# Patient Record
Sex: FEMALE | Race: WHITE | HISPANIC OR LATINO | Employment: FULL TIME | URBAN - METROPOLITAN AREA
[De-identification: names, ages, dates, MRNs, and addresses within clinical notes are randomized per-mention and may not be internally consistent; named-entity substitution may affect disease eponyms.]

---

## 2017-01-09 ENCOUNTER — OFFICE VISIT (OUTPATIENT)
Dept: OBSTETRICS AND GYNECOLOGY | Facility: CLINIC | Age: 27
End: 2017-01-09
Payer: MEDICAID

## 2017-01-09 VITALS
SYSTOLIC BLOOD PRESSURE: 120 MMHG | HEIGHT: 65 IN | WEIGHT: 198 LBS | BODY MASS INDEX: 32.99 KG/M2 | DIASTOLIC BLOOD PRESSURE: 74 MMHG

## 2017-01-09 DIAGNOSIS — R30.0 SPASTIC DYSURIA: Primary | ICD-10-CM

## 2017-01-09 PROCEDURE — 87086 URINE CULTURE/COLONY COUNT: CPT

## 2017-01-09 PROCEDURE — 87077 CULTURE AEROBIC IDENTIFY: CPT

## 2017-01-09 PROCEDURE — 87591 N.GONORRHOEAE DNA AMP PROB: CPT

## 2017-01-09 PROCEDURE — 99213 OFFICE O/P EST LOW 20 MIN: CPT | Mod: S$PBB,,, | Performed by: OBSTETRICS & GYNECOLOGY

## 2017-01-09 PROCEDURE — 99999 PR PBB SHADOW E&M-EST. PATIENT-LVL III: CPT | Mod: PBBFAC,,, | Performed by: OBSTETRICS & GYNECOLOGY

## 2017-01-09 PROCEDURE — 87186 SC STD MICRODIL/AGAR DIL: CPT

## 2017-01-09 PROCEDURE — 99213 OFFICE O/P EST LOW 20 MIN: CPT | Mod: PBBFAC | Performed by: OBSTETRICS & GYNECOLOGY

## 2017-01-09 PROCEDURE — 87088 URINE BACTERIA CULTURE: CPT

## 2017-01-09 RX ORDER — SULFAMETHOXAZOLE AND TRIMETHOPRIM 800; 160 MG/1; MG/1
1 TABLET ORAL 2 TIMES DAILY
Qty: 6 TABLET | Refills: 0 | Status: SHIPPED | OUTPATIENT
Start: 2017-01-09 | End: 2017-01-12

## 2017-01-10 NOTE — PROGRESS NOTES
CC:  Painful urination    HPI:  26 yro  who uses Nexplanon for contraception presents due to painful urination and inability to fully void.  Pt states she has just recently become sexually active after a long time.    Urine dipstick shows positive for WBC's and positive for RBC's.  Micro exam: not done.    Physical Exam   Constitutional: She is oriented to person, place, and time. She appears well-developed and well-nourished. No distress.   HENT:   Head: Normocephalic and atraumatic.   Neck: Normal range of motion. Neck supple. No tracheal deviation present. No thyromegaly present.   Cardiovascular: Normal rate.    Pulmonary/Chest: Effort normal.   Abdominal: Soft.   Neurological: She is alert and oriented to person, place, and time. No cranial nerve deficit. Coordination normal.   Skin: She is not diaphoretic.   Psychiatric: She has a normal mood and affect. Her behavior is normal. Judgment and thought content normal.   Vitals reviewed.            Assessment/Plan  1. Spastic dysuria  POCT URINE DIPSTICK WITHOUT MICROSCOPE    Urine culture    C. trachomatis/N. gonorrhoeae by AMP DNA Urine    sulfamethoxazole-trimethoprim 800-160mg (BACTRIM DS) 800-160 mg Tab

## 2017-01-11 LAB
BACTERIA UR CULT: NORMAL
C TRACH DNA SPEC QL NAA+PROBE: NEGATIVE
N GONORRHOEA DNA SPEC QL NAA+PROBE: NEGATIVE

## 2017-02-05 ENCOUNTER — HOSPITAL ENCOUNTER (EMERGENCY)
Facility: OTHER | Age: 27
Discharge: HOME | End: 2017-02-05
Attending: EMERGENCY MEDICINE
Payer: MEDICAID

## 2017-02-05 VITALS
RESPIRATION RATE: 18 BRPM | SYSTOLIC BLOOD PRESSURE: 145 MMHG | OXYGEN SATURATION: 98 % | DIASTOLIC BLOOD PRESSURE: 83 MMHG | TEMPERATURE: 98 F | HEART RATE: 98 BPM

## 2017-02-05 DIAGNOSIS — N30.01 ACUTE CYSTITIS WITH HEMATURIA: Primary | ICD-10-CM

## 2017-02-05 DIAGNOSIS — N32.89 BLADDER SPASMS: ICD-10-CM

## 2017-02-05 LAB
B-HCG UR QL: NEGATIVE
CTP QC/QA: YES

## 2017-02-05 PROCEDURE — 87077 CULTURE AEROBIC IDENTIFY: CPT

## 2017-02-05 PROCEDURE — 87086 URINE CULTURE/COLONY COUNT: CPT

## 2017-02-05 PROCEDURE — 81003 URINALYSIS AUTO W/O SCOPE: CPT

## 2017-02-05 PROCEDURE — 81025 URINE PREGNANCY TEST: CPT | Performed by: INTERNAL MEDICINE

## 2017-02-05 PROCEDURE — 87088 URINE BACTERIA CULTURE: CPT

## 2017-02-05 PROCEDURE — 99283 EMERGENCY DEPT VISIT LOW MDM: CPT

## 2017-02-05 PROCEDURE — 99284 EMERGENCY DEPT VISIT MOD MDM: CPT

## 2017-02-05 PROCEDURE — 87186 SC STD MICRODIL/AGAR DIL: CPT

## 2017-02-05 PROCEDURE — 25000003 PHARM REV CODE 250: Performed by: EMERGENCY MEDICINE

## 2017-02-05 PROCEDURE — 87210 SMEAR WET MOUNT SALINE/INK: CPT

## 2017-02-05 PROCEDURE — 81025 URINE PREGNANCY TEST: CPT

## 2017-02-05 RX ORDER — ONDANSETRON 4 MG/1
4 TABLET, ORALLY DISINTEGRATING ORAL
Status: DISCONTINUED | OUTPATIENT
Start: 2017-02-05 | End: 2017-02-05

## 2017-02-05 RX ORDER — HYDROCODONE BITARTRATE AND ACETAMINOPHEN 5; 325 MG/1; MG/1
1 TABLET ORAL
Status: COMPLETED | OUTPATIENT
Start: 2017-02-05 | End: 2017-02-05

## 2017-02-05 RX ORDER — CLINDAMYCIN PHOSPHATE 150 MG/ML
600 INJECTION, SOLUTION INTRAVENOUS
Status: DISCONTINUED | OUTPATIENT
Start: 2017-02-05 | End: 2017-02-05

## 2017-02-05 RX ORDER — PHENAZOPYRIDINE HYDROCHLORIDE 200 MG/1
200 TABLET, FILM COATED ORAL 3 TIMES DAILY PRN
Qty: 6 TABLET | Refills: 0 | Status: SHIPPED | OUTPATIENT
Start: 2017-02-05 | End: 2017-02-07

## 2017-02-05 RX ORDER — CIPROFLOXACIN 500 MG/1
500 TABLET ORAL 2 TIMES DAILY
Qty: 14 TABLET | Refills: 0 | Status: SHIPPED | OUTPATIENT
Start: 2017-02-05 | End: 2017-02-12

## 2017-02-05 RX ORDER — OXYBUTYNIN CHLORIDE 5 MG/1
5 TABLET ORAL 3 TIMES DAILY
Qty: 30 TABLET | Refills: 11 | Status: SHIPPED | OUTPATIENT
Start: 2017-02-05 | End: 2017-07-17

## 2017-02-05 RX ORDER — HYDROCODONE BITARTRATE AND ACETAMINOPHEN 5; 325 MG/1; MG/1
1 TABLET ORAL EVERY 6 HOURS PRN
Qty: 12 TABLET | Refills: 0 | Status: SHIPPED | OUTPATIENT
Start: 2017-02-05 | End: 2017-07-17

## 2017-02-05 RX ADMIN — HYDROCODONE BITARTRATE AND ACETAMINOPHEN 1 TABLET: 5; 325 TABLET ORAL at 08:02

## 2017-02-05 NOTE — ED AVS SNAPSHOT
[facility] EMERGENCY DEPARTMENT  [address] [address]               Crystal Tsai   2017  6:54 AM   ED    Description:  Female : 1990   Department:  [facility] Emergency Department           Your Care was Coordinated By:     Provider Role From To    Alda Alfonso MD Attending Provider 17 0713 --      Reason for Visit     Cystitis           Diagnoses this Visit        Comments    Acute cystitis with hematuria    -  Primary     Bladder spasms           ED Disposition     ED Disposition Condition Comment    Discharge             To Do List           Follow-up Information     Follow up with Jayme Romano NP.    Specialty:  Internal Medicine    Contact information:    [address] [place] [address]  [phone]         These Medications        Disp Refills Start End    ciprofloxacin HCl (CIPRO) 500 MG tablet 14 tablet 0 2017    Take 1 tablet (500 mg total) by mouth 2 (two) times daily. - Oral    Pharmacy: [facility] [address] [place] [address] EXPY AT [facility] Ph #: [phone]       oxybutynin (DITROPAN) 5 MG Tab 30 tablet 11 2017    Take 1 tablet (5 mg total) by mouth 3 (three) times daily. - Oral    Pharmacy: [facility] [address] [place] AT [facility] Ph #: [phone]       phenazopyridine (PYRIDIUM) 200 MG tablet 6 tablet 0 2017    Take 1 tablet (200 mg total) by mouth 3 (three) times daily as needed for Pain. - Oral    Pharmacy: [facility] [address] [place] [address] [place] AT [facility] Ph #: [phone]       hydrocodone-acetaminophen 5-325mg (NORCO) 5-325 mg per tablet 12 tablet 0 2017     Take 1 tablet by mouth every 6 (six) hours as needed for Pain. - Oral    Pharmacy: [facility] [address] [place] AT [facility] Ph #:  [phone]         [facility]     [facility] On Call Nurse Care Line - 24/7 Assistance  Registered nurses in the [facility] Center provide clinical advisement, health education, appointment booking, and other advisory services.  Call for this free service at [phone].             Medications           Message regarding Medications     Verify the changes and/or additions to your medication regime listed below are the same as discussed with your clinician today.  If any of these changes or additions are incorrect, please notify your healthcare provider.        START taking these NEW medications        Refills    ciprofloxacin HCl (CIPRO) 500 MG tablet 0    Sig: Take 1 tablet (500 mg total) by mouth 2 (two) times daily.    Class: Print    Route: Oral    oxybutynin (DITROPAN) 5 MG Tab 11    Sig: Take 1 tablet (5 mg total) by mouth 3 (three) times daily.    Class: Print    Route: Oral    phenazopyridine (PYRIDIUM) 200 MG tablet 0    Sig: Take 1 tablet (200 mg total) by mouth 3 (three) times daily as needed for Pain.    Class: Print    Route: Oral    hydrocodone-acetaminophen 5-325mg (NORCO) 5-325 mg per tablet 0    Sig: Take 1 tablet by mouth every 6 (six) hours as needed for Pain.    Class: Print    Route: Oral      These medications were administered today        Dose Freq    hydrocodone-acetaminophen 5-325mg per tablet 1 tablet 1 tablet ED 1 Time    Sig: Take 1 tablet by mouth ED 1 Time.    Class: Normal    Route: Oral      STOP taking these medications     VIRTUSSIN AC  mg/5 mL syrup            Verify that the below list of medications is an accurate representation of the medications you are currently taking.  If none reported, the list may be blank. If incorrect, please contact your healthcare provider. Carry this list with you in case of emergency.           Current Medications     amoxicillin-clavulanate 875-125mg (AUGMENTIN) 875-125 mg per tablet     butalbital-acetaminophen-caffeine  -40 mg (FIORICET, ESGIC) -40 mg per tablet Take 1 tablet by mouth every 4 (four) hours as needed for Pain.    ciprofloxacin HCl (CIPRO) 500 MG tablet Take 1 tablet (500 mg total) by mouth 2 (two) times daily.    clobetasol (TEMOVATE) 0.05 % cream Apply topically 2 (two) times daily.    etonogestrel (NEXPLANON) 68 mg Impl by Subdermal route.    hydrocodone-acetaminophen 5-325mg (NORCO) 5-325 mg per tablet Take 1 tablet by mouth every 6 (six) hours as needed for Pain.    hydrocodone-acetaminophen 5-325mg per tablet 1 tablet Take 1 tablet by mouth ED 1 Time.    labetalol (NORMODYNE) 100 MG tablet Take 1 tablet (100 mg total) by mouth 2 (two) times daily.    methylPREDNISolone (MEDROL DOSEPACK) 4 mg tablet follow package directions    oxybutynin (DITROPAN) 5 MG Tab Take 1 tablet (5 mg total) by mouth 3 (three) times daily.    phenazopyridine (PYRIDIUM) 200 MG tablet Take 1 tablet (200 mg total) by mouth 3 (three) times daily as needed for Pain.           Clinical Reference Information           Your Vitals Were     BP Pulse Temp Resp Last Period SpO2    145/83 98 97.9 °F (36.6 °C) (Tympanic) 18 01/21/2017 (Approximate) 98%      Allergies as of 2/5/2017        Reactions    Naprosyn [Naproxen] Hives    Ultram [Tramadol] Nausea And Vomiting      Immunizations Administered on Date of Encounter - 2/5/2017     None      ED Micro, Lab, POCT     Start Ordered       Status Ordering Provider    02/05/17 0808 02/05/17 0807  Vaginal Screen Vagina  STAT      Ordered     02/05/17 0718 02/05/17 0717  Urine culture **CANNOT BE ORDERED STAT**  Once      In process     02/05/17 0711 02/05/17 0711  POCT urine pregnancy  Once      Final result     02/05/17 0711 02/05/17 0711  POCT URINALYSIS W/O SCOPE  Once      Completed       ED Imaging Orders     None        Discharge Instructions       Cipro 500 mg 1 by mouth twice daily until gone for a bladder infection.    Pyridium 200 mg 1 by mouth 3 times daily for 2 days for burning  with urination.  This medication will turn her urine bright orange.  Oxybutynin 5 mg 1 by mouth 3 times daily as needed for bladder spasm.  Norco 5 mg/325 mg 1 by mouth every 6 hours as needed for pain.  Follow up with primary care or your urologist in the next week for recheck.      Discharge References/Attachments     BLADDER INFECTION, FEMALE (ADULT) (ENGLISH)      Smoking Cessation     If you would like to quit smoking:   You may be eligible for free services if you are a [place] resident and started smoking cigarettes before September 1, 1988.  Call the Smoking Cessation Trust ([facility]) toll free at ([phone]) [phone] or ([phone] [phone].   Call 5-800-QUIT-NOW if you do not meet the above criteria.             [facility] Emergency Department complies with applicable Federal civil rights laws and does not discriminate on the basis of race, color, national origin, age, disability, or sex.        Language Assistance Services     ATTENTION: Language assistance services are available, free of charge. Please call [phone].      ATENCIÓN: Si habla lorena, tiene a porter disposición servicios gratuitos de asistencia lingüística. Llame al [phone].     SABRINA Ý: N?u b?n nói Ti?ng Vi?t, có các d?ch v? h? tr? ngôn ng? mi?n phí dành cho b?n. G?i s? [phone].

## 2017-02-05 NOTE — ED PROVIDER NOTES
Encounter Date: 2/5/2017       History     Chief Complaint   Patient presents with    Cystitis     Pt stated she just finished ATB 2 weeks ago for UTI, now with c/o bladder pressure/dysuria/urinary urgency that began 3 hours PTA     Review of patient's allergies indicates:   Allergen Reactions    Naprosyn [naproxen] Hives    Ultram [tramadol] Nausea And Vomiting     HPI Comments: 26-year-old female reports lower abdominal/bladder pressure with burning, dysuria and urinary urgency that began about 4:00 this morning.  She reports she was treated for urinary tract infection 2 weeks ago and completed those antibiotics.  Patient has a history of spastic dysuria per review of old chart. No new sex partner since her last GYN visit in January.  She does report a mucoid discharge.    Patient is a 26 y.o. female presenting with the following complaint: dysuria. The history is provided by the patient.   Dysuria    This is a recurrent problem. The current episode started 3 to 5 hours ago. The problem occurs every urination. The problem has been rapidly worsening. The quality of the pain is described as burning (Lower abdominal pelvic cramping). The pain is at a severity of 9/10. She is sexually active. Associated symptoms include frequency. Pertinent negatives include no chills, no nausea and no vomiting.     Past Medical History   Diagnosis Date    Interstitial cystitis     Migraine      Past Medical History Pertinent Negatives   Diagnosis Date Noted    Asthma 5/4/2014    Depression 5/4/2014    Diabetes mellitus 4/21/2013    Diabetes mellitus 5/4/2014    GERD (gastroesophageal reflux disease) 5/4/2014    Hypertension 4/21/2013     Past Surgical History   Procedure Laterality Date    Pr exploratory of abdomen      Abdominal surgery       Family History   Problem Relation Age of Onset    Cancer Mother     Diabetes Father      Social History   Substance Use Topics    Smoking status: Current Every Day Smoker      Packs/day: 0.50    Smokeless tobacco: Never Used    Alcohol use 0.0 oz/week     0 Standard drinks or equivalent per week      Comment: occassionally     Review of Systems   Constitutional: Negative for chills and fever.   Gastrointestinal: Negative for abdominal pain, nausea and vomiting.   Genitourinary: Positive for dysuria, frequency, pelvic pain and vaginal discharge. Negative for genital sores.   Skin: Negative for rash and wound.       Physical Exam   Initial Vitals   BP Pulse Resp Temp SpO2   02/05/17 0700 02/05/17 0700 02/05/17 0700 02/05/17 0700 02/05/17 0700   145/83 98 18 97.9 °F (36.6 °C) 98 %     Physical Exam    Nursing note and vitals reviewed.  Constitutional: Vital signs are normal. She appears well-developed and well-nourished. She is cooperative.   HENT:   Head: Normocephalic and atraumatic.   Neck: Full passive range of motion without pain and phonation normal.   Cardiovascular: Normal rate, regular rhythm and normal heart sounds.   Pulses:       Radial pulses are 2+ on the right side, and 2+ on the left side.   Pulmonary/Chest: Effort normal and breath sounds normal.   Abdominal: Soft. There is no tenderness.   Genitourinary: Pelvic exam was performed with patient prone. There is no rash or lesion on the right labia. There is no rash or lesion on the left labia. Cervix exhibits no motion tenderness. Right adnexum displays no tenderness. Left adnexum displays no tenderness. Vaginal discharge found.   Genitourinary Comments: Severino vaginal discharge; no focal tenderness on pelvic exam   Neurological: She is alert and oriented to person, place, and time.   Skin: Skin is warm and dry.         ED Course   Procedures  Labs Reviewed   CULTURE, URINE   POCT URINE PREGNANCY   POCT URINALYSIS W/O SCOPE             Medical Decision Making:   History:   Old Records Summarized: other records.       <> Summary of Records: Initial urinalysis and urine culture results.  Initial Assessment:   Dysuria and lower  abdominal pelvic burning and cramping for several hours  Differential Diagnosis:   Tract infection, bladder spasms, pelvic pain  Clinical Tests:   Lab Tests: Ordered and Reviewed  The following lab test(s) were unremarkable: Urinalysis and UPT       <> Summary of Lab: Urinalysis showed large blood, greater than 300 mg/dL of protein, and large leukocytes but negative nitrites.  UPT negative  ED Management:  Patient had a urine culture last month which grew out Proteus mirabilis which was sensitive to Bactrim which the patient was placed on.  We'll start Cipro 500 mg twice a day for 1 week, Pyridium 200 mg 3 times daily for dysuria, oxybutynin 5 mg up to 3 times a day for bladder spasms, and Norco 7.5 mg when necessary pain.  Urine culture was sent today, and the patient was advised to follow up with her primary care or her urologist as soon as possible.                   ED Course     Clinical Impression:   The encounter diagnosis was Acute cystitis with hematuria.    Disposition:   Disposition: Discharged  Condition: Stable       Alda Alfonso MD  02/05/17 2032

## 2017-02-05 NOTE — DISCHARGE INSTRUCTIONS
Cipro 500 mg 1 by mouth twice daily until gone for a bladder infection.    Pyridium 200 mg 1 by mouth 3 times daily for 2 days for burning with urination.  This medication will turn her urine bright orange.  Oxybutynin 5 mg 1 by mouth 3 times daily as needed for bladder spasm.  Norco 5 mg/325 mg 1 by mouth every 6 hours as needed for pain.  Follow up with primary care or your urologist in the next week for recheck.

## 2017-02-05 NOTE — ED NOTES
Awaiting pt's ride prior to administering narcotic medication. Pt updated on procedure of ride required.

## 2017-02-06 LAB
BILIRUB SERPL-MCNC: NEGATIVE MG/DL
BLOOD, POC UA: NORMAL
CLARITY, POC UA: NORMAL
COLOR, POC UA: NORMAL
GLUCOSE SERPL-MCNC: NEGATIVE MG/DL (ref 70–110)
LEUKOCYTE EST, POC UA: NORMAL
NITRITE, POC UA: NEGATIVE
PH SMN: 6.5 [PH]
POC KETONES, BLOOD: NEGATIVE
PROTEIN, POC: >=300 MG/DL
SPECIFIC GRAVITY, POC UA: 1.02
UROBILINOGEN, POC UA: 1 E.U./DL

## 2017-02-07 LAB — BACTERIA UR CULT: NORMAL

## 2017-07-05 ENCOUNTER — OFFICE VISIT (OUTPATIENT)
Dept: OBSTETRICS AND GYNECOLOGY | Facility: CLINIC | Age: 27
End: 2017-07-05
Payer: MEDICAID

## 2017-07-05 VITALS
SYSTOLIC BLOOD PRESSURE: 126 MMHG | DIASTOLIC BLOOD PRESSURE: 70 MMHG | BODY MASS INDEX: 28.17 KG/M2 | WEIGHT: 165 LBS | HEIGHT: 64 IN

## 2017-07-05 DIAGNOSIS — N76.3 SUBACUTE VULVITIS: ICD-10-CM

## 2017-07-05 DIAGNOSIS — N30.10 CHRONIC INTERSTITIAL CYSTITIS WITHOUT HEMATURIA: ICD-10-CM

## 2017-07-05 DIAGNOSIS — B37.31 CANDIDA VAGINITIS: Primary | ICD-10-CM

## 2017-07-05 PROCEDURE — 87480 CANDIDA DNA DIR PROBE: CPT

## 2017-07-05 PROCEDURE — 99213 OFFICE O/P EST LOW 20 MIN: CPT | Mod: PBBFAC | Performed by: OBSTETRICS & GYNECOLOGY

## 2017-07-05 PROCEDURE — 99213 OFFICE O/P EST LOW 20 MIN: CPT | Mod: S$PBB,,, | Performed by: OBSTETRICS & GYNECOLOGY

## 2017-07-05 PROCEDURE — 87660 TRICHOMONAS VAGIN DIR PROBE: CPT

## 2017-07-05 PROCEDURE — 99999 PR PBB SHADOW E&M-EST. PATIENT-LVL III: CPT | Mod: PBBFAC,,, | Performed by: OBSTETRICS & GYNECOLOGY

## 2017-07-05 RX ORDER — CLOTRIMAZOLE AND BETAMETHASONE DIPROPIONATE 10; .64 MG/G; MG/G
CREAM TOPICAL
Qty: 15 G | Refills: 1 | Status: SHIPPED | OUTPATIENT
Start: 2017-07-05 | End: 2017-07-17

## 2017-07-05 RX ORDER — FLUCONAZOLE 150 MG/1
150 TABLET ORAL DAILY
Qty: 1 TABLET | Refills: 0 | Status: SHIPPED | OUTPATIENT
Start: 2017-07-05 | End: 2017-07-06

## 2017-07-05 RX ORDER — SULFAMETHOXAZOLE AND TRIMETHOPRIM 800; 160 MG/1; MG/1
TABLET ORAL
COMMUNITY
Start: 2017-07-03 | End: 2017-07-17 | Stop reason: ALTCHOICE

## 2017-07-05 NOTE — PROGRESS NOTES
"  Subjective:       Patient ID: Crystal Tsai is a 26 y.o. female.    Chief Complaint:  Pelvic Pain      History of Present Illness  HPI  Patient comes in today complaining of having vulva and vaginal pain for the past weeks.  Lower abdominal/pelvic pain.  More toward midline.   Sharp "like a paper cut" with clitoris feels like "being pinched"  Now with odor and itching.  She called her PCP.  Bactrim DS given.   Just recently finished the course of antibiotic.    History of interstitial cystitis.  Has not been to Urology.    Denies fever or chills.  No nausea or vomiting.        GYN & OB History  Patient's last menstrual period was 2017.   Date of Last Pap: 2013    OB History    Para Term  AB Living   2 2 2     2   SAB TAB Ectopic Multiple Live Births           2      # Outcome Date GA Lbr Tyler/2nd Weight Sex Delivery Anes PTL Lv   2 Term 14 40w0d  3.317 kg (7 lb 5 oz) F   N RAJ   1 Term 07 40w0d  3.26 kg (7 lb 3 oz) F Vag-Spont EPI N RAJ        Past Medical History:   Diagnosis Date    Interstitial cystitis     Migraine        Past Surgical History:   Procedure Laterality Date    ABDOMINAL SURGERY      UT EXPLORATORY OF ABDOMEN         Family History   Problem Relation Age of Onset    Cancer Mother     Diabetes Father        Social History     Social History    Marital status: Single     Spouse name: N/A    Number of children: N/A    Years of education: N/A     Social History Main Topics    Smoking status: Current Every Day Smoker     Packs/day: 0.50    Smokeless tobacco: Never Used    Alcohol use 0.0 oz/week      Comment: occassionally    Drug use: No    Sexual activity: Yes     Partners: Male     Birth control/ protection: None     Other Topics Concern    None     Social History Narrative    She has a high school diploma. Patient is currently not working. She has been together with the same person for over 3 years. He works as a .              "       Current Outpatient Prescriptions   Medication Sig Dispense Refill    amoxicillin-clavulanate 875-125mg (AUGMENTIN) 875-125 mg per tablet   0    butalbital-acetaminophen-caffeine -40 mg (FIORICET, ESGIC) -40 mg per tablet Take 1 tablet by mouth every 4 (four) hours as needed for Pain. 30 tablet 1    clobetasol (TEMOVATE) 0.05 % cream Apply topically 2 (two) times daily. 45 g 0    clotrimazole-betamethasone 1-0.05% (LOTRISONE) cream Apply to affected area 2 times daily 15 g 1    etonogestrel (NEXPLANON) 68 mg Impl by Subdermal route.      fluconazole (DIFLUCAN) 150 MG Tab Take 1 tablet (150 mg total) by mouth once daily. 1 tablet 0    hydrocodone-acetaminophen 5-325mg (NORCO) 5-325 mg per tablet Take 1 tablet by mouth every 6 (six) hours as needed for Pain. 12 tablet 0    labetalol (NORMODYNE) 100 MG tablet Take 1 tablet (100 mg total) by mouth 2 (two) times daily. 60 tablet 2    methylPREDNISolone (MEDROL DOSEPACK) 4 mg tablet follow package directions 21 tablet 0    oxybutynin (DITROPAN) 5 MG Tab Take 1 tablet (5 mg total) by mouth 3 (three) times daily. 30 tablet 11    pentosan polysulfate (ELMIRON) 100 mg Cap Take 1 capsule (100 mg total) by mouth 3 (three) times daily. 30 capsule 0    sulfamethoxazole-trimethoprim 800-160mg (BACTRIM DS) 800-160 mg Tab        No current facility-administered medications for this visit.        Review of patient's allergies indicates:   Allergen Reactions    Naprosyn [naproxen] Hives    Ultram [tramadol] Nausea And Vomiting       Review of Systems  Review of Systems   Constitutional: Negative for activity change, appetite change, chills, fatigue, fever and unexpected weight change.   HENT: Negative for mouth sores.    Respiratory: Negative for cough, shortness of breath and wheezing.    Cardiovascular: Negative for chest pain and palpitations.   Gastrointestinal: Positive for abdominal pain. Negative for bloating, blood in stool, constipation, nausea  and vomiting.   Endocrine: Negative for diabetes and hot flashes.   Genitourinary: Positive for dyspareunia, dysuria and pelvic pain. Negative for frequency, hematuria, menorrhagia, menstrual problem, urgency, vaginal bleeding, vaginal discharge, vaginal pain, dysmenorrhea, urinary incontinence, postcoital bleeding and vaginal odor.        Vulva and vaginal pain with irritation   Musculoskeletal: Negative for back pain and myalgias.   Skin:  Negative for rash.   Neurological: Negative for seizures and headaches.   Psychiatric/Behavioral: Negative for depression and sleep disturbance. The patient is not nervous/anxious.    Breast: Negative for breast mass, breast pain and nipple discharge          Objective:    Physical Exam:   Constitutional: She appears well-developed and well-nourished. No distress.    HENT:   Head: Normocephalic and atraumatic.    Eyes: EOM are normal.    Neck: Normal range of motion.     Pulmonary/Chest: Effort normal. No respiratory distress.        Abdominal: Soft. She exhibits no distension. There is no tenderness. There is no rebound and no guarding.     Genitourinary: Vagina normal and uterus normal. No vaginal discharge found.   Genitourinary Comments: Vulva with obvious irritation over labia minora toward posterior fourchette.  Vaginal vault with good support.  Minimal clumpy white discharge noted.  No obvious lesion.  Cervix is without any cervical motion tenderness.  No obvious lesion.  Uterus is small, non-tender, normal contour.  Adnexa is without any masses or tenderness.    Tender bladder.           Musculoskeletal: Normal range of motion.       Neurological: She is alert.    Skin: Skin is warm and dry.    Psychiatric: She has a normal mood and affect.          Assessment:        1. Candida vaginitis    2. Chronic interstitial cystitis without hematuria    3. Subacute vulvitis             Plan:      I have discussed with the patient regarding her condition  Affirm  Fluconazole 150  mg po    Elmiron.  Patient would need to see Urology soon    She was told to stop shaving.  Lotrisone given to use on vulva.

## 2017-07-06 ENCOUNTER — TELEPHONE (OUTPATIENT)
Dept: OBSTETRICS AND GYNECOLOGY | Facility: CLINIC | Age: 27
End: 2017-07-06

## 2017-07-06 DIAGNOSIS — B96.89 BACTERIAL VAGINOSIS: Primary | ICD-10-CM

## 2017-07-06 DIAGNOSIS — N76.0 BACTERIAL VAGINOSIS: Primary | ICD-10-CM

## 2017-07-06 LAB
CANDIDA RRNA VAG QL PROBE: POSITIVE
G VAGINALIS RRNA GENITAL QL PROBE: POSITIVE
T VAGINALIS RRNA GENITAL QL PROBE: NEGATIVE

## 2017-07-06 RX ORDER — METRONIDAZOLE 500 MG/1
500 TABLET ORAL EVERY 12 HOURS
Qty: 14 TABLET | Refills: 0 | Status: SHIPPED | OUTPATIENT
Start: 2017-07-06 | End: 2017-07-20

## 2017-07-17 ENCOUNTER — HOSPITAL ENCOUNTER (EMERGENCY)
Facility: OTHER | Age: 27
Discharge: HOME | End: 2017-07-17
Attending: EMERGENCY MEDICINE
Payer: MEDICAID

## 2017-07-17 VITALS
BODY MASS INDEX: 28.15 KG/M2 | DIASTOLIC BLOOD PRESSURE: 82 MMHG | SYSTOLIC BLOOD PRESSURE: 119 MMHG | RESPIRATION RATE: 18 BRPM | TEMPERATURE: 98 F | WEIGHT: 164 LBS | HEART RATE: 103 BPM

## 2017-07-17 DIAGNOSIS — N12 PYELONEPHRITIS: ICD-10-CM

## 2017-07-17 DIAGNOSIS — R33.9 INCOMPLETE BLADDER EMPTYING: Primary | ICD-10-CM

## 2017-07-17 LAB
B-HCG UR QL: NEGATIVE
BILIRUBIN, POC UA: NEGATIVE
BLOOD, POC UA: ABNORMAL
CLARITY, POC UA: CLEAR
COLOR, POC UA: YELLOW
CTP QC/QA: YES
GLUCOSE, POC UA: NEGATIVE
KETONES, POC UA: ABNORMAL
LEUKOCYTE EST, POC UA: ABNORMAL
NITRITE, POC UA: NEGATIVE
PH UR STRIP: 6 [PH]
PROTEIN, POC UA: NEGATIVE
SPECIFIC GRAVITY, POC UA: 1.02
UROBILINOGEN, POC UA: 0.2 E.U./DL

## 2017-07-17 PROCEDURE — 63600175 PHARM REV CODE 636 W HCPCS: Performed by: EMERGENCY MEDICINE

## 2017-07-17 PROCEDURE — 87086 URINE CULTURE/COLONY COUNT: CPT

## 2017-07-17 PROCEDURE — 99284 EMERGENCY DEPT VISIT MOD MDM: CPT | Mod: 25

## 2017-07-17 PROCEDURE — 96372 THER/PROPH/DIAG INJ SC/IM: CPT

## 2017-07-17 PROCEDURE — 81025 URINE PREGNANCY TEST: CPT | Performed by: EMERGENCY MEDICINE

## 2017-07-17 PROCEDURE — 81003 URINALYSIS AUTO W/O SCOPE: CPT

## 2017-07-17 RX ORDER — PHENAZOPYRIDINE HYDROCHLORIDE 200 MG/1
200 TABLET, FILM COATED ORAL 3 TIMES DAILY
Qty: 6 TABLET | Refills: 0 | Status: SHIPPED | OUTPATIENT
Start: 2017-07-17 | End: 2017-07-28

## 2017-07-17 RX ORDER — CIPROFLOXACIN 500 MG/1
500 TABLET ORAL 2 TIMES DAILY
Qty: 20 TABLET | Refills: 0 | Status: SHIPPED | OUTPATIENT
Start: 2017-07-17 | End: 2017-07-28

## 2017-07-17 RX ORDER — IBUPROFEN 800 MG/1
800 TABLET ORAL EVERY 6 HOURS PRN
Qty: 20 TABLET | Refills: 0 | Status: SHIPPED | OUTPATIENT
Start: 2017-07-17 | End: 2017-08-31

## 2017-07-17 RX ORDER — CEFTRIAXONE 1 G/1
1 INJECTION, POWDER, FOR SOLUTION INTRAMUSCULAR; INTRAVENOUS
Status: DISCONTINUED | OUTPATIENT
Start: 2017-07-17 | End: 2017-07-18 | Stop reason: HOSPADM

## 2017-07-17 RX ORDER — KETOROLAC TROMETHAMINE 30 MG/ML
60 INJECTION, SOLUTION INTRAMUSCULAR; INTRAVENOUS
Status: COMPLETED | OUTPATIENT
Start: 2017-07-17 | End: 2017-07-17

## 2017-07-17 RX ADMIN — KETOROLAC TROMETHAMINE 60 MG: 30 INJECTION, SOLUTION INTRAMUSCULAR at 09:07

## 2017-07-18 NOTE — ED TRIAGE NOTES
Patient states she has a long history of urinary problems.  She has been on several antibiotics with no relief.  She is having burning upon urination, which she does not normally have.  She states she is in a lot of pain.

## 2017-07-18 NOTE — ED PROVIDER NOTES
Encounter Date: 7/17/2017       History     Chief Complaint   Patient presents with    Flank Pain     painful urination with left flank pain, hx of kidney stones    Dysuria     26 y.o. Female past medical history of interstitial cystitis presents to emergency department complaining of acute suprapubic pain, pressure, dysuria, hesitancy, tenesmus that has been intermittently recurrent over the last 2 months.          She states she was seen by her PCP on 7/3/17  was prescribed Bactrim ×3 days - completed.   Patient was seen by her OB/GYN on July 5, 2017 and diagnosed with candida vaginitis as well as BV and was prescribed Flagyl as well as fluconazole. .  Review of patient's allergies indicates:   Allergen Reactions    Naprosyn [naproxen] Hives    Ultram [tramadol] Nausea And Vomiting     Past Medical History:   Diagnosis Date    Interstitial cystitis     Migraine      Past Surgical History:   Procedure Laterality Date    ABDOMINAL SURGERY      [place] EXPLORATORY OF ABDOMEN       Family History   Problem Relation Age of Onset    Cancer Mother     Diabetes Father      Social History   Substance Use Topics    Smoking status: Current Every Day Smoker     Packs/day: 0.50    Smokeless tobacco: Never Used    Alcohol use 0.0 oz/week      Comment: occassionally     Review of Systems   Constitutional: Negative for chills and fever.   Gastrointestinal: Negative for abdominal pain, constipation, diarrhea, nausea and vomiting.   Genitourinary: Positive for difficulty urinating, dysuria, flank pain (left), frequency, hematuria (gross) and pelvic pain. Negative for decreased urine volume and vaginal discharge.   All other systems reviewed and are negative.      Physical Exam     Initial Vitals [07/17/17 2048]   BP Pulse Resp Temp SpO2   119/82 103 18 97.7 °F (36.5 °C) --      MAP       94.33         Physical Exam    Nursing note and vitals reviewed.  Constitutional: She appears well-developed and well-nourished. She is  not diaphoretic. No distress.   HENT:   Head: Normocephalic and atraumatic.   Mouth/Throat: Oropharynx is clear and moist. No oropharyngeal exudate.   Eyes: EOM are normal. Pupils are equal, round, and reactive to light.   Neck: Normal range of motion. Neck supple.   Cardiovascular: Normal rate, regular rhythm and intact distal pulses.   No murmur heard.  Pulmonary/Chest: No accessory muscle usage or stridor. No tachypnea. No respiratory distress.   Abdominal: Soft. Bowel sounds are normal. She exhibits no distension. There is no tenderness. There is CVA tenderness (left). There is no rebound, no guarding, no tenderness at McBurney's point and negative Freeman's sign.   Musculoskeletal: Normal range of motion. She exhibits no edema or tenderness.   Neurological: She is alert and oriented to person, place, and time. She has normal strength. No cranial nerve deficit.   Skin: Skin is warm and dry. No erythema. No pallor.   Psychiatric: She has a normal mood and affect.         ED Course   Procedures  Labs Reviewed - No data to display          Medical Decision Making:   Clinical Tests:   Lab Tests: Ordered and Reviewed  ED Management:  Post void residual ~ 60 ml by bedside ultrasound                   ED Course     Labs Reviewed  Admission on 07/17/2017   Component Date Value Ref Range Status    POC Preg Test, Ur 07/17/2017 Negative  Negative Final     Acceptable 07/17/2017 Yes   Final    Glucose, UA 07/17/2017 Negative*  Final    Bilirubin, UA 07/17/2017 Negative*  Final    Ketones, UA 07/17/2017 1+*  Final    Spec Grav UA 07/17/2017 1.025   Final    Blood, UA 07/17/2017 1+*  Final    PH, UA 07/17/2017 6.0   Final    Protein, UA 07/17/2017 Negative*  Final    Urobilinogen, UA 07/17/2017 0.2  E.U./dL Final    Nitrite, UA 07/17/2017 Negative*  Final    Leukocytes, UA 07/17/2017 1+*  Final    Color, UA 07/17/2017 Yellow   Final    Clarity, UA 07/17/2017 Clear   Final        Imaging  Reviewed    Imaging Results          CT Renal Stone Study ABD Pelvis WO (Final result)  Result time 07/17/17 21:59:23    Final result by Osiris Meza MD (07/17/17 21:59:23)                 Impression:        1. No acute process a CT findings identified to explain patient's symptoms of flank pain on this noncontrast CT.    2. Right adnexal 3.4 x 4.4 cm probable cyst. Further evaluation versus short-term followup in 6-8 weeks with elective pelvic ultrasound can be obtained as warranted.      Electronically signed by: OSIRIS MEZA MD, MD  Date:     07/17/17  Time:    21:59              Narrative:    CT of the abdomen and pelvis without contrast per renal stone protocol:    Results:  Comparison made to abdominal ultrasound 5/4/14, pelvic ultrasound 5/4/14, chest radiograph 8/2/12, lumbar spine series 11/15/11.  Please note that the lack of intravenous contrast limits evaluation of soft tissue and vascular structures.    The lung bases are clear.  The visualized heart and pericardium are unremarkable.      The unenhanced liver, gallbladder, pancreas, spleen, stomach, duodenum, and adrenal glands are without significant abnormality.    The kidneys are normal in shape, size, and location.  No radiodense calculus seen within the collecting systems on either side or urinary bladder. No hydronephrosis or significant perinephric stranding. The ureters are normal in course and caliber.  The urinary bladder is within normal limits.  Pelvic phleboliths noted. There is a 3.4 x 4.4 cm oval shaped fluid attenuation structure at the posterior right adnexal region suggestive of a cyst. Noncontrast appearance of the uterus and left adnexa region are within normal limits. No significant amount of free fluid within the pelvis.    No ascites or free air.  No lymphadenopathy.    The appendix and terminal ileum appear normal.  The small and large loops of bowel are normal in caliber without focal wall thickening or adjacent fat  stranding.  Small fat containing umbilical hernia. No pneumatosis or portal venous gas.    The aorta tapers appropriately in its descent through the abdomen.    The osseous structures appear intact.                              Medications given in ED    Medications   cefTRIAXone injection 1 g (not administered)   ketorolac injection 60 mg (60 mg Intramuscular Given 7/17/17 2150)       Discharge Medications     Medication List with Changes/Refills   New Medications    CIPROFLOXACIN HCL (CIPRO) 500 MG TABLET    Take 1 tablet (500 mg total) by mouth 2 (two) times daily.    IBUPROFEN (ADVIL,MOTRIN) 800 MG TABLET    Take 1 tablet (800 mg total) by mouth every 6 (six) hours as needed for Pain.    PHENAZOPYRIDINE (PYRIDIUM) 200 MG TABLET    Take 1 tablet (200 mg total) by mouth 3 (three) times daily.   Current Medications    BUTALBITAL-ACETAMINOPHEN-CAFFEINE -40 MG (FIORICET, ESGIC) -40 MG PER TABLET    Take 1 tablet by mouth every 4 (four) hours as needed for Pain.    ETONOGESTREL (NEXPLANON) 68 MG IMPL    by Subdermal route.    METRONIDAZOLE (FLAGYL) 500 MG TABLET    Take 1 tablet (500 mg total) by mouth every 12 (twelve) hours.   Discontinued Medications    AMOXICILLIN-CLAVULANATE 875-125MG (AUGMENTIN) 875-125 MG PER TABLET        CLOBETASOL (TEMOVATE) 0.05 % CREAM    Apply topically 2 (two) times daily.    CLOTRIMAZOLE-BETAMETHASONE 1-0.05% (LOTRISONE) CREAM    Apply to affected area 2 times daily    HYDROCODONE-ACETAMINOPHEN 5-325MG (NORCO) 5-325 MG PER TABLET    Take 1 tablet by mouth every 6 (six) hours as needed for Pain.    LABETALOL (NORMODYNE) 100 MG TABLET    Take 1 tablet (100 mg total) by mouth 2 (two) times daily.    METHYLPREDNISOLONE (MEDROL DOSEPACK) 4 MG TABLET    follow package directions    OXYBUTYNIN (DITROPAN) 5 MG TAB    Take 1 tablet (5 mg total) by mouth 3 (three) times daily.    SULFAMETHOXAZOLE-TRIMETHOPRIM 800-160MG (BACTRIM DS) 800-160 MG TAB                 Patient discharged to  home in stable condition with instructions to:   1. Please take all meds as prescribed.  2. Follow-up with your primary care doctor   3. Return precautions discussed and patient and/or family/caretaker understands to return to the emergency room for any concerns including worsening of your current symptoms, fever, chills, night sweats, worsening pain, chest pain, shortness of breath, nausea, vomiting, diarrhea, bleeding, headache, difficulty talking, visual disturbances, weakness, numbness or any other acute concerns    Clinical Impression:   The primary encounter diagnosis was Incomplete bladder emptying. A diagnosis of Pyelonephritis was also pertinent to this visit.                           Mikel Hernandez MD  07/28/17 8829

## 2017-07-19 LAB — BACTERIA UR CULT: NO GROWTH

## 2017-07-25 ENCOUNTER — TELEPHONE (OUTPATIENT)
Dept: OBSTETRICS AND GYNECOLOGY | Facility: CLINIC | Age: 27
End: 2017-07-25

## 2017-07-25 NOTE — TELEPHONE ENCOUNTER
----- Message from Hodan Olivera sent at 7/25/2017  1:31 PM CDT -----  Contact: [phone]  Pt is returning the phone call Please call pt at your earliest convenience.  Thanks !

## 2017-07-25 NOTE — TELEPHONE ENCOUNTER
----- Message from Cristiane Stephen sent at 7/25/2017  1:01 PM CDT -----  Patient states she has a cyst and its starting to bulge out. She wants to be seen asap in the early morning hours. Please call at [phone] Thank you!

## 2017-07-25 NOTE — TELEPHONE ENCOUNTER
Called pt. No answer. LM letting pt know that we attempted contacting her to schedule an appointment. LM asking pt to return our call.

## 2017-07-28 ENCOUNTER — OFFICE VISIT (OUTPATIENT)
Dept: OBSTETRICS AND GYNECOLOGY | Facility: CLINIC | Age: 27
End: 2017-07-28
Payer: MEDICAID

## 2017-07-28 VITALS
DIASTOLIC BLOOD PRESSURE: 70 MMHG | BODY MASS INDEX: 27.48 KG/M2 | HEIGHT: 64 IN | TEMPERATURE: 99 F | WEIGHT: 160.94 LBS | SYSTOLIC BLOOD PRESSURE: 124 MMHG

## 2017-07-28 DIAGNOSIS — R10.2 FEMALE PELVIC PAIN: ICD-10-CM

## 2017-07-28 DIAGNOSIS — N30.10 CHRONIC INTERSTITIAL CYSTITIS WITHOUT HEMATURIA: Primary | ICD-10-CM

## 2017-07-28 DIAGNOSIS — M79.18 MYOFASCIAL PAIN: ICD-10-CM

## 2017-07-28 PROCEDURE — 99213 OFFICE O/P EST LOW 20 MIN: CPT | Mod: S$PBB,,, | Performed by: OBSTETRICS & GYNECOLOGY

## 2017-07-28 PROCEDURE — 99999 PR PBB SHADOW E&M-EST. PATIENT-LVL III: CPT | Mod: PBBFAC,,, | Performed by: OBSTETRICS & GYNECOLOGY

## 2017-07-28 PROCEDURE — 99213 OFFICE O/P EST LOW 20 MIN: CPT | Mod: PBBFAC | Performed by: OBSTETRICS & GYNECOLOGY

## 2017-07-28 RX ORDER — PHENTERMINE HYDROCHLORIDE 37.5 MG/1
TABLET ORAL
Refills: 0 | COMMUNITY
Start: 2017-05-31 | End: 2017-08-31

## 2017-07-28 RX ORDER — PROMETHAZINE HYDROCHLORIDE 12.5 MG/1
TABLET ORAL
COMMUNITY
Start: 2017-05-25 | End: 2017-08-31

## 2017-07-28 NOTE — PROGRESS NOTES
Subjective:       Patient ID: Crystal Tsai is a 26 y.o. female.    Chief Complaint:  Abdominal Pain (Pt complaining of pelvic pain 3 days ago)      History of Present Illness  HPI  Patient comes in today complaining of having pelvic pain for the past three days.    Also with abdominal pain.      Been to our Emergency Department.  CT scan with 4.4 x 3.4 cm right ovarian cyst.    Has been treated and worked up for IC  Getting pelvic PT.  But not going regularly  Previous history of narcotic abuse, status post rehab  History of Depression.   Was on medication.  Not at this time.  Patient with appointment to see her psychiatrist next week.    No other complaint.  Tolerating regular diet  Normal bowel function.      GYN & OB History  Patient's last menstrual period was 2017 (within days).   Date of Last Pap: 2013    OB History    Para Term  AB Living   2 2 2     2   SAB TAB Ectopic Multiple Live Births           2      # Outcome Date GA Lbr Tyler/2nd Weight Sex Delivery Anes PTL Lv   2 Term 14 40w0d  3.317 kg (7 lb 5 oz) F   N RAJ   1 Term 07 40w0d  3.26 kg (7 lb 3 oz) F Vag-Spont EPI N RAJ        Past Medical History:   Diagnosis Date    Interstitial cystitis     Migraine        Past Surgical History:   Procedure Laterality Date    ABDOMINAL SURGERY      [place] EXPLORATORY OF ABDOMEN         Family History   Problem Relation Age of Onset    Cancer Mother     Diabetes Father        Social History     Social History    Marital status: Single     Spouse name: N/A    Number of children: N/A    Years of education: N/A     Social History Main Topics    Smoking status: Current Every Day Smoker     Packs/day: 0.50    Smokeless tobacco: Never Used    Alcohol use 0.0 oz/week      Comment: occassionally    Drug use: No    Sexual activity: Yes     Partners: Male     Birth control/ protection: None     Other Topics Concern    None     Social History Narrative    She has a  high school diploma. Patient is currently not working. She has been together with the same person for over 3 years. He works as a .                    Current Outpatient Prescriptions   Medication Sig Dispense Refill    etonogestrel (NEXPLANON) 68 mg Impl by Subdermal route.      ibuprofen (ADVIL,MOTRIN) 800 MG tablet Take 1 tablet (800 mg total) by mouth every 6 (six) hours as needed for Pain. 20 tablet 0    phentermine (ADIPEX-P) 37.5 mg tablet TK 1 T PO QD  0    promethazine (PHENERGAN) 12.5 MG Tab        No current facility-administered medications for this visit.        Review of patient's allergies indicates:   Allergen Reactions    Naprosyn [naproxen] Hives    Ultram [tramadol] Nausea And Vomiting       Review of Systems  Review of Systems   Constitutional: Positive for fatigue. Negative for activity change, appetite change, chills, fever and unexpected weight change.   HENT: Negative for mouth sores.    Respiratory: Negative for cough, shortness of breath and wheezing.    Cardiovascular: Negative for chest pain and palpitations.   Gastrointestinal: Positive for abdominal pain. Negative for bloating, blood in stool, constipation, nausea and vomiting.   Endocrine: Negative for diabetes and hot flashes.   Genitourinary: Positive for flank pain, menorrhagia, menstrual problem and pelvic pain. Negative for dyspareunia, dysuria, frequency, hematuria, urgency, vaginal bleeding, vaginal discharge, vaginal pain, dysmenorrhea, urinary incontinence, postcoital bleeding and vaginal odor.   Musculoskeletal: Negative for back pain and myalgias.   Skin:  Negative for rash.   Neurological: Negative for seizures and headaches.   Psychiatric/Behavioral: Positive for depression. Negative for sleep disturbance. The patient is nervous/anxious.    Breast: Negative for breast mass, breast pain and nipple discharge          Objective:    Physical Exam:   Constitutional: She appears well-developed and well-nourished. No  distress.    HENT:   Head: Normocephalic and atraumatic.    Eyes: EOM are normal.    Neck: Normal range of motion.     Pulmonary/Chest: Effort normal. No respiratory distress.   Breasts: Non-tender, no engorgement, no masses, no retraction, no discharge. Negative for lymphadenopathy.         Abdominal: Soft. She exhibits no distension. There is no tenderness. There is no rebound and no guarding.   No obvious hernia     Genitourinary: Uterus normal. Vaginal discharge found.   Genitourinary Comments: Vulva without any obvious lesions.  Vaginal vault with good support.  Minimal bloody discharge noted.  No obvious lesion.  Cervix is without any cervical motion tenderness.  No obvious lesion.  Uterus is small, non-tender, normal contour.  Adnexa is without any masses or tenderness.  Significant bladder tenderness.           Musculoskeletal: Normal range of motion.   Nexplanon palpable in left arm       Neurological: She is alert.    Skin: Skin is warm and dry.    Psychiatric: She has a normal mood and affect.          Assessment:        1. Chronic interstitial cystitis without hematuria    2. Female pelvic pain    3. Myofascial pain              Plan:      I have discussed with the patient regarding her condition   Will repeat ultrasound in about 6 weeks  Back after ultrasound for follow-up    Prescription for Elmiron and Urelle  No narcotic.    Patient will resume antidepressant with her psychiatrist

## 2017-07-28 NOTE — PATIENT INSTRUCTIONS
Call [phone] to get a pelvic ultrasound in about 6 weeks, end of August 2017  Back after ultrasound for Pap and follow-up in early September 2017

## 2017-07-31 ENCOUNTER — TELEPHONE (OUTPATIENT)
Dept: OBSTETRICS AND GYNECOLOGY | Facility: CLINIC | Age: 27
End: 2017-07-31

## 2017-07-31 NOTE — TELEPHONE ENCOUNTER
----- Message from Valencia Mattson sent at 7/28/2017 12:41 PM CDT -----  Contact: SELF  PA NEEDED: pentosan polysulfate (ELMIRON) 100 mg Cap                         methen-m.blue-s.phos-phsal-hyo 81-10.8-40.8 mg Tab

## 2017-09-28 ENCOUNTER — TELEPHONE (OUTPATIENT)
Dept: OBSTETRICS AND GYNECOLOGY | Facility: CLINIC | Age: 27
End: 2017-09-28

## 2017-09-28 NOTE — TELEPHONE ENCOUNTER
----- Message from Cristiane Stephen sent at 9/28/2017  2:06 PM CDT -----  Patient has an ovarian cyst and wants to be seen with Dr Johnson immediately. She was offered other appts but only wants her dr. Please call at [phone] thank you!

## 2017-09-29 ENCOUNTER — OFFICE VISIT (OUTPATIENT)
Dept: OBSTETRICS AND GYNECOLOGY | Facility: CLINIC | Age: 27
End: 2017-09-29
Payer: MEDICAID

## 2017-09-29 VITALS
BODY MASS INDEX: 26.33 KG/M2 | WEIGHT: 158.06 LBS | DIASTOLIC BLOOD PRESSURE: 60 MMHG | SYSTOLIC BLOOD PRESSURE: 102 MMHG | HEIGHT: 65 IN | TEMPERATURE: 99 F

## 2017-09-29 DIAGNOSIS — R10.32 LEFT LOWER QUADRANT PAIN: ICD-10-CM

## 2017-09-29 DIAGNOSIS — N83.209 OVARIAN CYST: Primary | ICD-10-CM

## 2017-09-29 PROCEDURE — 3008F BODY MASS INDEX DOCD: CPT | Mod: ,,, | Performed by: OBSTETRICS & GYNECOLOGY

## 2017-09-29 PROCEDURE — 99213 OFFICE O/P EST LOW 20 MIN: CPT | Mod: S$PBB,,, | Performed by: OBSTETRICS & GYNECOLOGY

## 2017-09-29 PROCEDURE — 99213 OFFICE O/P EST LOW 20 MIN: CPT | Mod: PBBFAC | Performed by: OBSTETRICS & GYNECOLOGY

## 2017-09-29 PROCEDURE — 99999 PR PBB SHADOW E&M-EST. PATIENT-LVL III: CPT | Mod: PBBFAC,,, | Performed by: OBSTETRICS & GYNECOLOGY

## 2017-09-29 NOTE — PROGRESS NOTES
"  Subjective:       Patient ID: Crystal Tsai is a 27 y.o. female.    Chief Complaint:  Pelvic Pain (Ovarian cyst, CT scan in [place])      History of Present Illness  HPI  Patient comes in today for follow-up  History of pelvic pain and interstitial cystitis  Was doing well until recently when she had intercourse after exercise.  Pain in left lower quadrant.  No radiation.  Has gotten somewhat better with time  [facility] at [facility] with small left ovarian cyst at 1.5 cm and small amount of free fluid in cul de sac    Seen Dr Boggs who had determined that she did not have a hernia.    IC seems improving.   But "I can't work out or have sex!  Can you take my ovary out?"    Denies fever or chills.  No nausea or vomiting.  No spotting.    Nexplanon since 3/2/2015    GYN & OB History  Patient's last menstrual period was 2017 (within days).   Date of Last Pap: 2013    OB History    Para Term  AB Living   2 2 2     2   SAB TAB Ectopic Multiple Live Births           2      # Outcome Date GA Lbr Tyler/2nd Weight Sex Delivery Anes PTL Lv   2 Term 14 40w0d  3.317 kg (7 lb 5 oz) F   N RAJ   1 Term 07 40w0d  3.26 kg (7 lb 3 oz) F Vag-Spont EPI N RAJ        Past Medical History:   Diagnosis Date    Interstitial cystitis     Migraine        Past Surgical History:   Procedure Laterality Date    ABDOMINAL SURGERY      [place] EXPLORATORY OF ABDOMEN         Family History   Problem Relation Age of Onset    Cancer Mother     Diabetes Father        Social History     Social History    Marital status: Single     Spouse name: N/A    Number of children: N/A    Years of education: N/A     Social History Main Topics    Smoking status: Current Every Day Smoker     Packs/day: 0.50    Smokeless tobacco: Never Used    Alcohol use No      Comment: occassionally    Drug use: No    Sexual activity: Yes     Partners: Male     Birth control/ protection: None     Other Topics " Concern    None     Social History Narrative    She has a high school diploma. Patient is currently not working. She has been together with the same person for over 3 years. He works as a .                    Current Outpatient Prescriptions   Medication Sig Dispense Refill    etonogestrel (NEXPLANON) 68 mg Impl by Subdermal route.      tramadol (ULTRAM) 50 mg tablet        No current facility-administered medications for this visit.        Review of patient's allergies indicates:   Allergen Reactions    Naprosyn [naproxen] Hives    Ultram [tramadol] Nausea And Vomiting         Review of Systems  Review of Systems   Constitutional: Negative for activity change, appetite change, chills, fatigue, fever and unexpected weight change.   HENT: Negative for mouth sores.    Respiratory: Negative for cough, shortness of breath and wheezing.    Cardiovascular: Negative for chest pain and palpitations.   Gastrointestinal: Positive for abdominal pain. Negative for bloating, blood in stool, constipation, nausea and vomiting.        Left lower quadrant pain   Endocrine: Negative for diabetes and hot flashes.   Genitourinary: Positive for dyspareunia, dysuria, frequency and pelvic pain. Negative for hematuria, menorrhagia, menstrual problem, urgency, vaginal bleeding, vaginal discharge, vaginal pain, dysmenorrhea, urinary incontinence, postcoital bleeding and vaginal odor.   Musculoskeletal: Negative for back pain and myalgias.   Skin:  Negative for rash.   Neurological: Negative for seizures and headaches.   Psychiatric/Behavioral: Negative for depression and sleep disturbance. The patient is not nervous/anxious.    Breast: Negative for breast mass, breast pain and nipple discharge          Objective:    Physical Exam:   Constitutional: She appears well-developed and well-nourished. No distress.    HENT:   Head: Normocephalic and atraumatic.    Eyes: EOM are normal.    Neck: Normal range of motion.      Pulmonary/Chest: Effort normal. No respiratory distress.        Abdominal: Soft. She exhibits no distension. There is no tenderness. There is no rebound and no guarding.   ?Hard, rubbery, tender, mobile mass, appears to be in subcutaneous fat in left lower quadrant.  No obvious hernia     Genitourinary: Vagina normal and uterus normal. No vaginal discharge found.   Genitourinary Comments: Vulva without any obvious lesions.  Vaginal vault with good support.  Minimal discharge noted.  No obvious lesion.  Cervix is without any cervical motion tenderness.  No obvious lesion.  Uterus is small, non-tender, normal contour.  Adnexa is without any masses or tenderness.  Left ovary is prominent and somewhat tender.           Musculoskeletal: Normal range of motion.       Neurological: She is alert.    Skin: Skin is warm and dry.    Psychiatric: She has a normal mood and affect.          Assessment:        1. Ovarian cyst    2. Left lower quadrant pain             Plan:      I have discussed with the patient regarding her condition  I don't think she should have surgery to remove her left ovary.    1.5 cm cyst is physiologic  She appears to have a small lipoma in her left lower quadrant of her abdomen.  No need for surgery at this time    We discussed possible management plan for ovarian cyst.    She did not want to be back on the pills; she could not remember to take them regularly    She has had the Nexplanon since March 2015.  Would like another one next year.    She will be back as needed

## 2017-11-14 ENCOUNTER — HOSPITAL ENCOUNTER (EMERGENCY)
Facility: OTHER | Age: 27
Discharge: LEFT BEFORE DISCHARGE | End: 2017-11-14
Attending: EMERGENCY MEDICINE
Payer: MEDICAID

## 2017-11-14 VITALS
OXYGEN SATURATION: 99 % | DIASTOLIC BLOOD PRESSURE: 80 MMHG | RESPIRATION RATE: 16 BRPM | HEART RATE: 85 BPM | HEIGHT: 65 IN | WEIGHT: 155 LBS | SYSTOLIC BLOOD PRESSURE: 134 MMHG | TEMPERATURE: 98 F | BODY MASS INDEX: 25.83 KG/M2

## 2017-11-14 DIAGNOSIS — Z53.21 PATIENT LEFT WITHOUT BEING SEEN: Primary | ICD-10-CM

## 2017-11-14 LAB
B-HCG UR QL: NEGATIVE
BILIRUBIN, POC UA: NEGATIVE
BLOOD, POC UA: ABNORMAL
CLARITY, POC UA: CLEAR
COLOR, POC UA: YELLOW
CTP QC/QA: YES
GLUCOSE, POC UA: NEGATIVE
KETONES, POC UA: NEGATIVE
LEUKOCYTE EST, POC UA: NEGATIVE
NITRITE, POC UA: NEGATIVE
PH UR STRIP: 6 [PH]
PROTEIN, POC UA: NEGATIVE
SPECIFIC GRAVITY, POC UA: <=1.005
UROBILINOGEN, POC UA: 0.2 E.U./DL

## 2017-11-14 PROCEDURE — 99900041 HC LEFT WITHOUT BEING SEEN- EMERGENCY

## 2017-11-14 PROCEDURE — 81025 URINE PREGNANCY TEST: CPT | Performed by: EMERGENCY MEDICINE

## 2017-11-14 PROCEDURE — 81003 URINALYSIS AUTO W/O SCOPE: CPT

## 2017-11-14 RX ORDER — IBUPROFEN 800 MG/1
800 TABLET ORAL 3 TIMES DAILY
COMMUNITY
End: 2017-11-24 | Stop reason: SDUPTHER

## 2017-11-14 RX ORDER — PHENTERMINE HYDROCHLORIDE 37.5 MG/1
37.5 TABLET ORAL
COMMUNITY
End: 2017-12-14

## 2017-11-14 RX ORDER — BUTALBITAL, ACETAMINOPHEN, CAFFEINE AND CODEINE PHOSPHATE 300; 50; 40; 30 MG/1; MG/1; MG/1; MG/1
CAPSULE ORAL EVERY 4 HOURS
COMMUNITY
End: 2018-01-03

## 2017-11-14 NOTE — ED PROVIDER NOTES
Encounter Date: 11/14/2017       History     Chief Complaint   Patient presents with    Back Pain     pt presents to ED with c/o left lower back pain, sharp in nature that started last week and has gotten progressively worse.      No patient in room.  Left without being seen          Review of patient's allergies indicates:   Allergen Reactions    Naprosyn [naproxen] Hives    Ultram [tramadol] Nausea And Vomiting     Past Medical History:   Diagnosis Date    Interstitial cystitis     Migraine      Past Surgical History:   Procedure Laterality Date    ABDOMINAL SURGERY      [place] EXPLORATORY OF ABDOMEN       Family History   Problem Relation Age of Onset    Cancer Mother     Diabetes Father      Social History   Substance Use Topics    Smoking status: Current Every Day Smoker     Packs/day: 0.50    Smokeless tobacco: Never Used    Alcohol use No      Comment: occassionally     Review of Systems  No patient in room.  Left without being seen  Physical Exam     Initial Vitals [11/14/17 1146]   BP Pulse Resp Temp SpO2   134/80 85 16 97.5 °F (36.4 °C) 99 %      MAP       98         Physical Exam  No patient in room.  Left without being seen  ED Course   Procedures  Labs Reviewed   POCT URINALYSIS W/O SCOPE - Abnormal; Notable for the following:        Result Value    Glucose, UA Negative (*)     Bilirubin, UA Negative (*)     Ketones, UA Negative (*)     Spec Grav UA <=1.005 (*)     Blood, UA 2+ (*)     Protein, UA Negative (*)     Nitrite, UA Negative (*)     Leukocytes, UA Negative (*)     All other components within normal limits   POCT URINE PREGNANCY   POCT URINALYSIS W/O SCOPE                               ED Course    No patient in room.  Left without being seen  Clinical Impression:   The encounter diagnosis was Patient left without being seen.                           Fang Bryant,   11/14/17 2761

## 2017-11-24 ENCOUNTER — HOSPITAL ENCOUNTER (EMERGENCY)
Facility: OTHER | Age: 27
Discharge: HOME | End: 2017-11-24
Attending: INTERNAL MEDICINE
Payer: MEDICAID

## 2017-11-24 VITALS
DIASTOLIC BLOOD PRESSURE: 71 MMHG | HEART RATE: 69 BPM | RESPIRATION RATE: 14 BRPM | HEIGHT: 65 IN | TEMPERATURE: 98 F | BODY MASS INDEX: 25.83 KG/M2 | WEIGHT: 155 LBS | SYSTOLIC BLOOD PRESSURE: 117 MMHG | OXYGEN SATURATION: 98 %

## 2017-11-24 DIAGNOSIS — N83.202 LEFT OVARIAN CYST: ICD-10-CM

## 2017-11-24 DIAGNOSIS — L03.012 CELLULITIS OF LEFT THUMB: Primary | ICD-10-CM

## 2017-11-24 LAB
B-HCG UR QL: NEGATIVE
BILIRUBIN, POC UA: NEGATIVE
BLOOD, POC UA: ABNORMAL
CLARITY, POC UA: CLEAR
COLOR, POC UA: YELLOW
CTP QC/QA: YES
GLUCOSE, POC UA: NEGATIVE
KETONES, POC UA: NEGATIVE
LEUKOCYTE EST, POC UA: NEGATIVE
NITRITE, POC UA: NEGATIVE
PH UR STRIP: 7 [PH]
PROTEIN, POC UA: NEGATIVE
SPECIFIC GRAVITY, POC UA: 1.01
UROBILINOGEN, POC UA: 0.2 E.U./DL

## 2017-11-24 PROCEDURE — 25000003 PHARM REV CODE 250: Performed by: INTERNAL MEDICINE

## 2017-11-24 PROCEDURE — 99283 EMERGENCY DEPT VISIT LOW MDM: CPT | Mod: 25

## 2017-11-24 PROCEDURE — 81025 URINE PREGNANCY TEST: CPT | Performed by: INTERNAL MEDICINE

## 2017-11-24 PROCEDURE — 96372 THER/PROPH/DIAG INJ SC/IM: CPT

## 2017-11-24 PROCEDURE — 63600175 PHARM REV CODE 636 W HCPCS: Performed by: INTERNAL MEDICINE

## 2017-11-24 PROCEDURE — 81003 URINALYSIS AUTO W/O SCOPE: CPT

## 2017-11-24 RX ORDER — IBUPROFEN 800 MG/1
800 TABLET ORAL EVERY 8 HOURS PRN
Qty: 30 TABLET | Refills: 0 | Status: SHIPPED | OUTPATIENT
Start: 2017-11-24 | End: 2018-01-03

## 2017-11-24 RX ORDER — KETOROLAC TROMETHAMINE 30 MG/ML
60 INJECTION, SOLUTION INTRAMUSCULAR; INTRAVENOUS
Status: COMPLETED | OUTPATIENT
Start: 2017-11-24 | End: 2017-11-24

## 2017-11-24 RX ORDER — CEPHALEXIN 500 MG/1
500 CAPSULE ORAL EVERY 12 HOURS
Qty: 20 CAPSULE | Refills: 0 | Status: SHIPPED | OUTPATIENT
Start: 2017-11-24 | End: 2017-12-05 | Stop reason: ALTCHOICE

## 2017-11-24 RX ORDER — CEPHALEXIN 500 MG/1
500 CAPSULE ORAL
Status: COMPLETED | OUTPATIENT
Start: 2017-11-24 | End: 2017-11-24

## 2017-11-24 RX ADMIN — KETOROLAC TROMETHAMINE 60 MG: 60 INJECTION, SOLUTION INTRAMUSCULAR at 11:11

## 2017-11-24 RX ADMIN — CEPHALEXIN 500 MG: 500 CAPSULE ORAL at 11:11

## 2017-11-24 NOTE — ED PROVIDER NOTES
Encounter Date: 11/24/2017       History     Chief Complaint   Patient presents with    Back Pain     lower back pain on L side x 2 weeks, L hand pain and itching since last night, unknown insect bite to L thumb     27-year-old female presents to the emergency department complaining of left flank pain ×2 weeks and left hand pain since yesterday.  She thinks she may have been bitten by an insect on her left hand because the area is also pruritic.  Denies fevers/chills, nausea/vomiting.      The history is provided by the patient. No  was used.   Back Pain    This is a new problem. The current episode started several days ago. The problem occurs intermittently. The problem has been waxing and waning. The pain is associated with no known injury. Pain location: Left flank and lumbar. The pain does not radiate. The pain is at a severity of 4/10. The symptoms are aggravated by bending, twisting and certain positions. The pain is the same all the time. Pertinent negatives include no chest pain, no fever, no weight loss, no abdominal pain, no abdominal swelling, no bowel incontinence, no perianal numbness, no bladder incontinence, no dysuria and no paresthesias.     Review of patient's allergies indicates:   Allergen Reactions    Naprosyn [naproxen] Hives    Ultram [tramadol] Nausea And Vomiting     Past Medical History:   Diagnosis Date    Interstitial cystitis     Migraine      Past Surgical History:   Procedure Laterality Date    ABDOMINAL SURGERY      [place] EXPLORATORY OF ABDOMEN       Family History   Problem Relation Age of Onset    Cancer Mother     Diabetes Father      Social History   Substance Use Topics    Smoking status: Current Every Day Smoker     Packs/day: 0.50    Smokeless tobacco: Never Used    Alcohol use No      Comment: occassionally     Review of Systems   Constitutional: Negative for fever and weight loss.   Cardiovascular: Negative for chest pain.   Gastrointestinal:  Negative for abdominal pain and bowel incontinence.   Genitourinary: Negative for bladder incontinence and dysuria.   Musculoskeletal: Positive for back pain. Negative for neck pain.   Skin: Positive for color change.   Neurological: Negative for paresthesias.   All other systems reviewed and are negative.      Physical Exam     Initial Vitals [11/24/17 1055]   BP Pulse Resp Temp SpO2   128/81 79 18 97.7 °F (36.5 °C) 96 %      MAP       96.67         Physical Exam    Nursing note and vitals reviewed.  Constitutional: She appears well-developed and well-nourished.   HENT:   Head: Normocephalic and atraumatic.   Right Ear: External ear normal.   Left Ear: External ear normal.   Eyes: EOM are normal.   Neck: Normal range of motion.   Cardiovascular: Normal rate and regular rhythm.   Pulmonary/Chest: Breath sounds normal. No respiratory distress.   Abdominal: Soft. Bowel sounds are normal. She exhibits no distension. There is no tenderness. There is no rebound.   Musculoskeletal: Normal range of motion.   Left flank pain upon bending at the waist.  No CVA tenderness   Neurological: She is alert.   Skin: Skin is warm and dry.   Left thumb dorsal erythema with slight induration and slight tenderness to palpation.  Clear drainage from Center without fluctuance   Psychiatric: She has a normal mood and affect.         ED Course   Procedures  Labs Reviewed   POCT URINALYSIS W/O SCOPE   POCT URINE PREGNANCY             Medical Decision Making:   Initial Assessment:   27-year-old female presents to the emergency department complaining of left flank pain ×2 weeks and left hand pain since yesterday.  She thinks she may have been bitten by an insect on her left hand because the area is also pruritic.  Denies fevers/chills, nausea/vomiting.  Differential Diagnosis:   Low back strain  Ovarian cyst  Pyelonephritis  Kidney stone  Acute cystitis  Cellulitis of thumb  Abscess of thumb  ED Management:  Urinalysis revealed trace blood.   Previous studies were reviewed which were similar to today's study.  No evidence of leukocytes or found and nitrites were negative.  Patient denies all symptoms of urinary tract infection.  She also has a history of left ovarian cyst which has caused pain in the past.  Plan to treat for left ovarian cyst pain and cellulitis of the left thumb.  She was given prescriptions for ibuprofen and Keflex and advised to follow-up with her primary care physician within the next 3 days for reevaluation.                   ED Course      Clinical Impression:   The primary encounter diagnosis was Cellulitis of left thumb. A diagnosis of Left ovarian cyst was also pertinent to this visit.    Disposition:   Disposition: Discharged  Condition: Stable                        Franko Nobles MD  11/24/17 3361

## 2017-11-24 NOTE — ED NOTES
C/o left lower back and flank pain that radiates to the left pelvis x 2 weeks, denies dysuria and frequency, denies vomiting and diarrhea, denies fever, night sweats last night, states she was bitten by an unknown insect last night, bite present to left thumb with clear drainage

## 2017-11-28 ENCOUNTER — TELEPHONE (OUTPATIENT)
Dept: OBSTETRICS AND GYNECOLOGY | Facility: CLINIC | Age: 27
End: 2017-11-28

## 2017-11-28 NOTE — TELEPHONE ENCOUNTER
Called and spoke with pt regarding issue with pelvic pain from possible ovarian cyst.  An appt offered for today but pt unable to so another appt offered for tomorrow 11/29/17 @ 9:00.  Pt agreed to that appt and will come in then to discuss possible surgery. natalio

## 2017-11-29 ENCOUNTER — OFFICE VISIT (OUTPATIENT)
Dept: OBSTETRICS AND GYNECOLOGY | Facility: CLINIC | Age: 27
End: 2017-11-29
Payer: MEDICAID

## 2017-11-29 VITALS
DIASTOLIC BLOOD PRESSURE: 60 MMHG | SYSTOLIC BLOOD PRESSURE: 110 MMHG | BODY MASS INDEX: 26.19 KG/M2 | HEIGHT: 65 IN | TEMPERATURE: 99 F | WEIGHT: 157.19 LBS

## 2017-11-29 DIAGNOSIS — N83.201 CYST OF RIGHT OVARY: Primary | ICD-10-CM

## 2017-11-29 DIAGNOSIS — N30.10 CHRONIC INTERSTITIAL CYSTITIS WITHOUT HEMATURIA: ICD-10-CM

## 2017-11-29 DIAGNOSIS — R10.2 FEMALE PELVIC PAIN: ICD-10-CM

## 2017-11-29 DIAGNOSIS — R10.32 LEFT LOWER QUADRANT PAIN: ICD-10-CM

## 2017-11-29 PROCEDURE — 99213 OFFICE O/P EST LOW 20 MIN: CPT | Mod: S$PBB,,, | Performed by: OBSTETRICS & GYNECOLOGY

## 2017-11-29 PROCEDURE — 99213 OFFICE O/P EST LOW 20 MIN: CPT | Mod: PBBFAC | Performed by: OBSTETRICS & GYNECOLOGY

## 2017-11-29 PROCEDURE — 99999 PR PBB SHADOW E&M-EST. PATIENT-LVL III: CPT | Mod: PBBFAC,,, | Performed by: OBSTETRICS & GYNECOLOGY

## 2017-11-29 NOTE — PROGRESS NOTES
Subjective:       Patient ID: Crystal Tsai is a 27 y.o. female.    Chief Complaint:  Pelvic Pain (Pt went to urgent care for pelvic pain)      History of Present Illness  HPI  Patient comes in today for follow-up, again complaining of lower abdominal/pelvic pain  History of right ovarian cyst, seen on CT scan in 2017.  Ultrasound ordered but has not been performed.    Pain now on the left lower quadrant.  Sharp, stabbing like a knife.  Radiating to the back and down thigh.  Movements worsen the pain.  History of IC.  But evaluation by Urology in  was negative.  No longer on Elmiron.    Denies fever or chills.  No nausea or vomiting.  Same partner.    No longer taking phentermine or tramadol.      GYN & OB History  Patient's last menstrual period was 2017 (exact date).   Date of Last Pap: 2013    OB History    Para Term  AB Living   2 2 2     2   SAB TAB Ectopic Multiple Live Births           2      # Outcome Date GA Lbr Tyler/2nd Weight Sex Delivery Anes PTL Lv   2 Term 14 40w0d  3.317 kg (7 lb 5 oz) F   N RAJ   1 Term 07 40w0d  3.26 kg (7 lb 3 oz) F Vag-Spont EPI N RAJ        Past Medical History:   Diagnosis Date    Interstitial cystitis     Migraine        Past Surgical History:   Procedure Laterality Date    ABDOMINAL SURGERY      NE EXPLORATORY OF ABDOMEN         Family History   Problem Relation Age of Onset    Cancer Mother     Diabetes Father        Social History     Social History    Marital status: Single     Spouse name: N/A    Number of children: N/A    Years of education: N/A     Social History Main Topics    Smoking status: Current Every Day Smoker     Packs/day: 0.50    Smokeless tobacco: Never Used    Alcohol use No      Comment: occassionally    Drug use: No    Sexual activity: Yes     Partners: Male     Birth control/ protection: None     Other Topics Concern    None     Social History Narrative    She has a high school  diploma. Patient is currently not working. She has been together with the same person for over 3 years. He works as a .                    Current Outpatient Prescriptions   Medication Sig Dispense Refill    butalbital-acetaminop-caf-cod -46-30 mg Cap Take by mouth every 4 (four) hours.      cephALEXin (KEFLEX) 500 MG capsule Take 1 capsule (500 mg total) by mouth every 12 (twelve) hours. 20 capsule 0    etonogestrel (NEXPLANON) 68 mg Impl by Subdermal route.      ibuprofen (ADVIL,MOTRIN) 800 MG tablet Take 1 tablet (800 mg total) by mouth every 8 (eight) hours as needed for Pain. 30 tablet 0    phentermine (ADIPEX-P) 37.5 mg tablet Take 37.5 mg by mouth before breakfast.      tramadol (ULTRAM) 50 mg tablet        No current facility-administered medications for this visit.        Review of patient's allergies indicates:   Allergen Reactions    Naprosyn [naproxen] Hives    Ultram [tramadol] Nausea And Vomiting       Review of Systems  Review of Systems   Constitutional: Negative for activity change, appetite change, chills, fatigue, fever and unexpected weight change.   HENT: Negative for mouth sores.    Respiratory: Negative for cough, shortness of breath and wheezing.    Cardiovascular: Negative for chest pain and palpitations.   Gastrointestinal: Positive for abdominal pain. Negative for bloating, blood in stool, constipation, nausea and vomiting.        Left lower quadrant pain   Endocrine: Negative for diabetes and hot flashes.   Genitourinary: Positive for dyspareunia and pelvic pain. Negative for hematuria, menorrhagia, menstrual problem, urgency, vaginal bleeding, vaginal discharge, vaginal pain, dysmenorrhea, urinary incontinence, postcoital bleeding and vaginal odor.   Musculoskeletal: Negative for back pain and myalgias.   Skin:  Negative for rash.   Neurological: Negative for seizures and headaches.   Psychiatric/Behavioral: Negative for depression and sleep disturbance. The patient  is not nervous/anxious.    Breast: Negative for breast mass, breast pain and nipple discharge          Objective:    Physical Exam:   Constitutional: She appears well-developed and well-nourished. No distress.    HENT:   Head: Normocephalic and atraumatic.    Eyes: EOM are normal.    Neck: Normal range of motion.     Pulmonary/Chest: Effort normal. No respiratory distress.        Abdominal: Soft. She exhibits no distension. There is no tenderness. There is no rebound and no guarding.     Genitourinary: Vagina normal and uterus normal. No vaginal discharge found.   Genitourinary Comments: Vulva without any obvious lesions.  Vaginal vault with good support.  Minimal discharge noted.  No obvious lesion.  Cervix is without any cervical motion tenderness.  No obvious lesion.  Uterus is small, non-tender, normal contour.  Adnexa is without any masses.  Minimal tenderness.    Bladder tenderness.           Musculoskeletal: Normal range of motion.       Neurological: She is alert.    Skin: Skin is warm and dry.    Psychiatric: She has a normal mood and affect.          Assessment:        1. Cyst of right ovary    2. Left lower quadrant pain    3. Chronic interstitial cystitis without hematuria    4. Female pelvic pain              Plan:      I have extensively discussed with the patient regarding her condition.   Pelvic ultrasound to follow-up with ovarian cysts and pelvic pain  Continue with Keflex and ibuprofen prn  Back after ultrasound    I told her that I thought her mental condition had much to magnify her abdominal/pelvic pain  She would need to be on antidepressant.  She is seeing a therapist now.  Would consider asking her to be placed on medication    I also told her that surgery, ie hysterectomy and oophorectomy, would not help with her pain

## 2017-12-01 ENCOUNTER — HOSPITAL ENCOUNTER (OUTPATIENT)
Dept: RADIOLOGY | Facility: HOSPITAL | Age: 27
Discharge: HOME | End: 2017-12-01
Attending: OBSTETRICS & GYNECOLOGY
Payer: MEDICAID

## 2017-12-01 DIAGNOSIS — N83.201 CYST OF RIGHT OVARY: ICD-10-CM

## 2017-12-01 DIAGNOSIS — R10.2 FEMALE PELVIC PAIN: ICD-10-CM

## 2017-12-01 DIAGNOSIS — R10.32 LEFT LOWER QUADRANT PAIN: ICD-10-CM

## 2017-12-01 PROCEDURE — 76856 US EXAM PELVIC COMPLETE: CPT | Mod: TC

## 2017-12-01 PROCEDURE — 76830 TRANSVAGINAL US NON-OB: CPT | Mod: 26,,, | Performed by: RADIOLOGY

## 2017-12-01 PROCEDURE — 76856 US EXAM PELVIC COMPLETE: CPT | Mod: 26,,, | Performed by: RADIOLOGY

## 2017-12-04 ENCOUNTER — TELEPHONE (OUTPATIENT)
Dept: OBSTETRICS AND GYNECOLOGY | Facility: CLINIC | Age: 27
End: 2017-12-04

## 2017-12-04 ENCOUNTER — HOSPITAL ENCOUNTER (EMERGENCY)
Facility: OTHER | Age: 27
Discharge: HOME | End: 2017-12-04
Attending: EMERGENCY MEDICINE
Payer: MEDICAID

## 2017-12-04 VITALS
SYSTOLIC BLOOD PRESSURE: 122 MMHG | RESPIRATION RATE: 14 BRPM | HEART RATE: 75 BPM | HEIGHT: 65 IN | DIASTOLIC BLOOD PRESSURE: 74 MMHG | OXYGEN SATURATION: 99 % | WEIGHT: 155 LBS | BODY MASS INDEX: 25.83 KG/M2

## 2017-12-04 DIAGNOSIS — N83.202 CYST OF LEFT OVARY: Primary | ICD-10-CM

## 2017-12-04 PROCEDURE — 99283 EMERGENCY DEPT VISIT LOW MDM: CPT

## 2017-12-04 RX ORDER — HYDROCODONE BITARTRATE AND ACETAMINOPHEN 5; 325 MG/1; MG/1
1 TABLET ORAL EVERY 4 HOURS PRN
Qty: 18 TABLET | Refills: 0 | Status: SHIPPED | OUTPATIENT
Start: 2017-12-04 | End: 2017-12-14

## 2017-12-04 RX ORDER — ONDANSETRON 4 MG/1
4 TABLET, ORALLY DISINTEGRATING ORAL EVERY 6 HOURS PRN
Qty: 10 TABLET | Refills: 0 | Status: SHIPPED | OUTPATIENT
Start: 2017-12-04 | End: 2018-01-03 | Stop reason: SDUPTHER

## 2017-12-04 NOTE — TELEPHONE ENCOUNTER
----- Message from Domonique Reeves sent at 12/4/2017  9:37 AM CST -----  Contact: self  [phone]  Pt is requesting to speak to you regarding her pain that she is having. Pls call pt [phone]. Thanks.......Yancy    PLS NOTE : Pt does have a appt for tomorrow 12-5-17. Thanks.......Yancy

## 2017-12-05 ENCOUNTER — OFFICE VISIT (OUTPATIENT)
Dept: OBSTETRICS AND GYNECOLOGY | Facility: CLINIC | Age: 27
End: 2017-12-05
Payer: MEDICAID

## 2017-12-05 VITALS
TEMPERATURE: 99 F | WEIGHT: 156.75 LBS | HEIGHT: 65 IN | SYSTOLIC BLOOD PRESSURE: 120 MMHG | DIASTOLIC BLOOD PRESSURE: 68 MMHG | BODY MASS INDEX: 26.12 KG/M2

## 2017-12-05 DIAGNOSIS — R10.2 FEMALE PELVIC PAIN: ICD-10-CM

## 2017-12-05 DIAGNOSIS — N83.202 CYST OF LEFT OVARY: ICD-10-CM

## 2017-12-05 DIAGNOSIS — R10.9 ABDOMINAL PAIN, UNSPECIFIED ABDOMINAL LOCATION: ICD-10-CM

## 2017-12-05 DIAGNOSIS — R10.32 LEFT LOWER QUADRANT PAIN: Primary | ICD-10-CM

## 2017-12-05 PROCEDURE — 99214 OFFICE O/P EST MOD 30 MIN: CPT | Mod: S$PBB,,, | Performed by: OBSTETRICS & GYNECOLOGY

## 2017-12-05 PROCEDURE — 99213 OFFICE O/P EST LOW 20 MIN: CPT | Mod: PBBFAC | Performed by: OBSTETRICS & GYNECOLOGY

## 2017-12-05 PROCEDURE — 99999 PR PBB SHADOW E&M-EST. PATIENT-LVL III: CPT | Mod: PBBFAC,,, | Performed by: OBSTETRICS & GYNECOLOGY

## 2017-12-05 NOTE — PROGRESS NOTES
"  Subjective:       Patient ID: Crystal Tsai is a 27 y.o. female.    Chief Complaint:  Pelvic Pain (Pt follow up ER on 17)      History of Present Illness  HPI  Patient comes in today with her mother.  Still in significant pain.  Frustrated.  Seen last week in our office.  Went to our Emergency Department on 2017 with pain.  Having left upper quadrant pain today, "stabbing."   Radiating from back down to left lower quadrant and pelvic area.  None of the pain medications she tried had worked for her.  Still spotting    CT scan in 2017 with right ovarian cyst at about 4.5 cm  Pelvic ultrasound on 2017 with left ovarian cyst at about 3.4 cm  History of interstitial cystitis though per patient's report, Urology did not think she had the condition after examination  Elmiron did not work for her pain    Would like to be treated for pain.  Not sure if she would want surgery.  Laparoscopy in  with minimal adhesions.  No evidence of endometriosis.        GYN & OB History  Patient's last menstrual period was 2017 (exact date).   Date of Last Pap: 2013    OB History    Para Term  AB Living   2 2 2     2   SAB TAB Ectopic Multiple Live Births           2      # Outcome Date GA Lbr Tyler/2nd Weight Sex Delivery Anes PTL Lv   2 Term 14 40w0d  3.317 kg (7 lb 5 oz) F   N RAJ   1 Term 07 40w0d  3.26 kg (7 lb 3 oz) F Vag-Spont EPI N RAJ        Past Medical History:   Diagnosis Date    Interstitial cystitis     Migraine        Past Surgical History:   Procedure Laterality Date    ABDOMINAL SURGERY      [place] EXPLORATORY OF ABDOMEN         Family History   Problem Relation Age of Onset    Cancer Mother     Diabetes Father        Social History     Social History    Marital status: Single     Spouse name: N/A    Number of children: N/A    Years of education: N/A     Social History Main Topics    Smoking status: Current Every Day Smoker     Packs/day: 0.50    " Smokeless tobacco: Never Used    Alcohol use No      Comment: occassionally    Drug use: No    Sexual activity: Yes     Partners: Male     Birth control/ protection: None     Other Topics Concern    None     Social History Narrative    She has a high school diploma. Patient is currently not working. She has been together with the same person for over 3 years. He works as a .                    Current Outpatient Prescriptions   Medication Sig Dispense Refill    butalbital-acetaminop-caf-cod -98-30 mg Cap Take by mouth every 4 (four) hours.      etonogestrel (NEXPLANON) 68 mg Impl by Subdermal route.      hydrocodone-acetaminophen 5-325mg (NORCO) 5-325 mg per tablet Take 1 tablet by mouth every 4 (four) hours as needed for Pain. 18 tablet 0    ibuprofen (ADVIL,MOTRIN) 800 MG tablet Take 1 tablet (800 mg total) by mouth every 8 (eight) hours as needed for Pain. 30 tablet 0    ondansetron (ZOFRAN-ODT) 4 MG TbDL Take 1 tablet (4 mg total) by mouth every 6 (six) hours as needed. 10 tablet 0    phentermine (ADIPEX-P) 37.5 mg tablet Take 37.5 mg by mouth before breakfast.      tramadol (ULTRAM) 50 mg tablet       norethindrone-ethinyl estradiol (OVCON) 0.4-35 mg-mcg per tablet Take 1 tablet by mouth once daily. 28 tablet 6     No current facility-administered medications for this visit.        Review of patient's allergies indicates:  No Known Allergies    Review of Systems  Review of Systems   Constitutional: Negative for activity change, appetite change, chills, fatigue, fever and unexpected weight change.   HENT: Negative for mouth sores.    Respiratory: Negative for cough, shortness of breath and wheezing.    Cardiovascular: Negative for chest pain and palpitations.   Gastrointestinal: Positive for abdominal pain. Negative for bloating, blood in stool, constipation, nausea and vomiting.        Left upper and lower quadrant pain   Endocrine: Negative for diabetes and hot flashes.    Genitourinary: Positive for dyspareunia and pelvic pain. Negative for hematuria, menorrhagia, menstrual problem, urgency, vaginal bleeding, vaginal discharge, vaginal pain, dysmenorrhea, urinary incontinence, postcoital bleeding and vaginal odor.   Musculoskeletal: Negative for back pain and myalgias.   Skin:  Negative for rash.   Neurological: Negative for seizures and headaches.   Psychiatric/Behavioral: Negative for depression and sleep disturbance. The patient is not nervous/anxious.    Breast: Negative for breast mass, breast pain and nipple discharge          Objective:    Physical Exam:   Constitutional: She appears well-developed and well-nourished. No distress.    HENT:   Head: Normocephalic and atraumatic.    Eyes: EOM are normal.    Neck: Normal range of motion.    Cardiovascular: Normal rate.     Pulmonary/Chest: Effort normal. No respiratory distress.        Abdominal: Soft. She exhibits no distension. There is no tenderness. There is no rebound and no guarding.   Tender over left side, especially upper quadrant.  No masses palpable.             Musculoskeletal: Normal range of motion.       Neurological: She is alert.    Skin: Skin is warm and dry.    Psychiatric:   Crying in pain.  Frustrated  Angry  Irritated            Assessment:        1. Left lower quadrant pain    2. Abdominal pain, unspecified abdominal location    3. Female pelvic pain    4. Cyst of left ovary             Plan:      I have extensively discussed with the patient and her mother regarding her condition.  Her mother would like for me to remove both of her ovaries and uterus to hopefully help her with her pain  I explained to both that her pain is not purely pelvic and gynecologic in origin  Total hysterectomy with removal of both ovaries may not remove all of her pain  She would be in surgical menopause.  We discussed life expectancy and risks of cardiovascular disease in women with surgical menopause  Her pain appears to be a  combination of several factors, including depression and anxiety.  She is working with a therapist as previously discussed    She has had the Nexplanon since 3/2/2015.  We will put her on Ovcon 35 today to suppress ovulation and hopefully preventing further ovarian cyst formation  She will be back in about a week or two to remove the Nexplanon  If the pills does not help her with her pelvic pain, we would consider repeating laparoscopy  Further plan pending.    Total time: 35 min

## 2017-12-05 NOTE — ED TRIAGE NOTES
"Pt registered at [facility] and "got tired of waiting". C/o left sided abd pain that radiates to vagina. Had recent ultrasound but unsure of results. Denies UTI sx. Labs completed at prior facility. LMP 11/12/2017 reports intermittent "spotting" since. Denies vag discharge   "

## 2017-12-05 NOTE — ED PROVIDER NOTES
Encounter Date: 12/4/2017       History     Chief Complaint   Patient presents with    Abdominal Pain     Patient was seen on 3 separate occasions including this one for left lower quadrant abdominal pain.  Patient was referred on 12/1/2017 to receive an outpatient ultrasound because ultrasound results have not been reviewed with the patient.  She presents here with similar type pain that radiates up her left lower quadrant and to her left mid quadrant.      The history is provided by the patient.   Abdominal Pain   The current episode started several weeks ago. The onset of the illness was gradual. Progression since onset: Waxing and waning. The abdominal pain is located in the LLQ. The abdominal pain radiates to the LUQ. The severity of the abdominal pain is 5/10. The other symptoms of the illness do not include fever, fatigue, jaundice, melena, nausea, vomiting, diarrhea, dysuria, hematemesis, hematochezia, vaginal discharge or vaginal bleeding.   The patient states that she believes she is currently not pregnant. The patient has not had a change in bowel habit. Additional symptoms associated with the illness include back pain. Symptoms associated with the illness do not include chills, anorexia, diaphoresis, heartburn, constipation, urgency, hematuria or frequency.     Review of patient's allergies indicates:  No Known Allergies  Past Medical History:   Diagnosis Date    Interstitial cystitis     Migraine      Past Surgical History:   Procedure Laterality Date    ABDOMINAL SURGERY      IN EXPLORATORY OF ABDOMEN       Family History   Problem Relation Age of Onset    Cancer Mother     Diabetes Father      Social History   Substance Use Topics    Smoking status: Current Every Day Smoker     Packs/day: 0.50    Smokeless tobacco: Never Used    Alcohol use No      Comment: occassionally     Review of Systems   Constitutional: Negative.  Negative for chills, diaphoresis, fatigue and fever.   HENT: Negative.     Eyes: Negative.    Respiratory: Negative.    Cardiovascular: Negative.    Gastrointestinal: Positive for abdominal pain. Negative for anorexia, constipation, diarrhea, heartburn, hematemesis, hematochezia, jaundice, melena, nausea and vomiting.   Endocrine: Negative.    Genitourinary: Negative.  Negative for dysuria, frequency, hematuria, urgency, vaginal bleeding and vaginal discharge.   Musculoskeletal: Positive for back pain.   Skin: Negative.    Allergic/Immunologic: Negative.    Neurological: Negative.    Hematological: Negative.    Psychiatric/Behavioral: Negative.    All other systems reviewed and are negative.      Physical Exam     Initial Vitals [12/04/17 2030]   BP Pulse Resp Temp SpO2   122/74 75 14 -- 99 %      MAP       90         Physical Exam    Nursing note and vitals reviewed.  Constitutional: Vital signs are normal. She appears well-developed. She is active and cooperative.   HENT:   Head: Normocephalic and atraumatic.   Eyes: Conjunctivae, EOM and lids are normal. Pupils are equal, round, and reactive to light.   Neck: Trachea normal and full passive range of motion without pain. Neck supple. No thyroid mass present.   Cardiovascular: Normal rate, regular rhythm, S1 normal, S2 normal, normal heart sounds, intact distal pulses and normal pulses.   Abdominal: Soft. Normal appearance, normal aorta and bowel sounds are normal. There is no hepatosplenomegaly. There is tenderness in the left lower quadrant. There is no rigidity, no rebound, no guarding, no CVA tenderness, no tenderness at McBurney's point and negative Freeman's sign. No hernia.   Musculoskeletal: Normal range of motion.   Lymphadenopathy:     She has no axillary adenopathy.   Neurological: She is alert and oriented to person, place, and time.   Skin: Skin is warm, dry and intact.   Psychiatric: She has a normal mood and affect. Her speech is normal and behavior is normal. Judgment and thought content normal. Cognition and memory are  normal.         ED Course   Procedures  Labs Reviewed - No data to display          Medical Decision Making:   ED Management:  The ultrasound shows a complex left ovarian cyst consistent with the patient's presenting etiology.  Patient currently on has an implant in her arm I will add Lo/Ovral as adjunctive therapy provide her with pain medication and nausea control.  The patient is asking for referral to a new GYN physician.                   ED Course      Clinical Impression:   The encounter diagnosis was Cyst of left ovary.                           Wei Catalan MD  12/04/17 2050

## 2017-12-08 ENCOUNTER — TELEPHONE (OUTPATIENT)
Dept: OBSTETRICS AND GYNECOLOGY | Facility: CLINIC | Age: 27
End: 2017-12-08

## 2017-12-08 NOTE — TELEPHONE ENCOUNTER
Spoke with pt. Pt informed she needs to try tylenol or IBU for head ache according to Dr. Johnson. Pt stated she has tried that and has had no relief. Pt advised to DC the OCP and f/u with Dr. Johnson next week. Pt stated she was just going to switch providers.

## 2017-12-08 NOTE — TELEPHONE ENCOUNTER
----- Message from Rosalie Siddiqui sent at 12/8/2017  9:27 AM CST -----  Patient states that her new birth control gives her headaches and requests to be prescribed something for it. She can be reached at [phone]. Thank you!

## 2017-12-11 NOTE — TELEPHONE ENCOUNTER
Pt with appt tomorrow for Nexplanon removal.  Any concerns about the OCP given can be discussed with Dr. Johnson during visit. natalio

## 2017-12-12 ENCOUNTER — TELEPHONE (OUTPATIENT)
Dept: OBSTETRICS AND GYNECOLOGY | Facility: CLINIC | Age: 27
End: 2017-12-12

## 2017-12-12 NOTE — TELEPHONE ENCOUNTER
----- Message from Ada Craven sent at 12/12/2017  9:50 AM CST -----  Contact: self  Pt called stating she will be 20 minutes late due to car trouble. Contact pt and advise regarding appt at [phone].    Thanks-

## 2017-12-14 ENCOUNTER — PROCEDURE VISIT (OUTPATIENT)
Dept: OBSTETRICS AND GYNECOLOGY | Facility: CLINIC | Age: 27
End: 2017-12-14
Payer: MEDICAID

## 2017-12-14 VITALS
TEMPERATURE: 99 F | HEIGHT: 65 IN | DIASTOLIC BLOOD PRESSURE: 70 MMHG | WEIGHT: 156.94 LBS | BODY MASS INDEX: 26.15 KG/M2 | SYSTOLIC BLOOD PRESSURE: 110 MMHG

## 2017-12-14 DIAGNOSIS — G43.009 MIGRAINE WITHOUT AURA AND WITHOUT STATUS MIGRAINOSUS, NOT INTRACTABLE: ICD-10-CM

## 2017-12-14 DIAGNOSIS — Z30.46 ENCOUNTER FOR NEXPLANON REMOVAL: Primary | ICD-10-CM

## 2017-12-14 PROCEDURE — 11982 REMOVE DRUG IMPLANT DEVICE: CPT | Mod: PBBFAC | Performed by: OBSTETRICS & GYNECOLOGY

## 2017-12-14 RX ORDER — HYDROXYZINE PAMOATE 25 MG/1
CAPSULE ORAL
Refills: 1 | COMMUNITY
Start: 2017-12-12 | End: 2018-12-13 | Stop reason: SDDI

## 2017-12-14 RX ORDER — SERTRALINE HYDROCHLORIDE 100 MG/1
TABLET, FILM COATED ORAL
Refills: 1 | COMMUNITY
Start: 2017-12-12 | End: 2018-12-13

## 2017-12-14 RX ORDER — BUTALBITAL, ACETAMINOPHEN AND CAFFEINE 50; 325; 40 MG/1; MG/1; MG/1
1 TABLET ORAL EVERY 4 HOURS PRN
Qty: 30 TABLET | Refills: 0 | Status: SHIPPED | OUTPATIENT
Start: 2017-12-14 | End: 2018-01-13

## 2017-12-14 NOTE — PROCEDURES
Removal of Nexplanon Device  Date/Time: 12/14/2017 11:26 AM  Performed by: CHANTEL BOOTHE.  Authorized by: CHANTEL BOOTHE.   Preparation: Patient was prepped and draped in the usual sterile fashion.  Local anesthesia used: no    Anesthesia:  Local anesthesia used: no    Sedation:  Patient sedated: no  Patient tolerance: Patient tolerated the procedure well with no immediate complications        NEXPLANON REMOVAL- PROCEDURE NOTES:    Again today we extensively discussed contraceptive options after the removal of her Nexplanon.  All options reiterated as per previous discussion.  Consents were then signed.  Risks and benefits of Implanon removal discussed.  The Nexplanon was palpable nearby the old implantation site.  This was shown to the patient.    The site was cleaned with Betadine.  A solution of 1% Xylocaine used for local anesthesia.  #11 blade was used to make a small 0.5 cm incision at the old implantation site.  The Nexplanon was then pushed toward the incision.  Hemostats were used to search for and grasp the implant to extract it through the incision.  We had to break up some of the adhesions encapsulated the old implant with a combination of 4 x 4 and hemostats.  Once the Nexplanon was seen, it was easily grasped and pulled through the incision.    The incision was then cleaned and dressed in 4 x 4.  Patient was instructed to keep the operative site clean and dry for 24 hours.  She can take over-the-counter Motrin, 2-3 pills every 4-6 hours as needed for pain.    Patient will be to come back to see me in 2-4 weeks for followup.  A prescription of Ovcon was given to the patient for contraception.    Patient with history of migraine headache.  Fioricet given

## 2018-01-02 ENCOUNTER — TELEPHONE (OUTPATIENT)
Dept: OBSTETRICS AND GYNECOLOGY | Facility: CLINIC | Age: 28
End: 2018-01-02

## 2018-01-02 NOTE — TELEPHONE ENCOUNTER
Pt stopped by office and an appt scheduled for tomorrow since it was late in the day for an appt with preferred doctor requested. Pt will come back tomorrow. natalio

## 2018-01-02 NOTE — TELEPHONE ENCOUNTER
----- Message from Domonique Reeves sent at 1/2/2018  2:37 PM CST -----  Contact: self [phone]  Pt is requesting to speak to you regarding pain that she is having and water keeps leaking out (pt is not pregnant ) . Pls call pt  [phone]. Thanks......Yancy

## 2018-01-03 ENCOUNTER — OFFICE VISIT (OUTPATIENT)
Dept: OBSTETRICS AND GYNECOLOGY | Facility: CLINIC | Age: 28
End: 2018-01-03
Payer: MEDICAID

## 2018-01-03 VITALS
TEMPERATURE: 99 F | DIASTOLIC BLOOD PRESSURE: 68 MMHG | HEIGHT: 65 IN | WEIGHT: 159.81 LBS | SYSTOLIC BLOOD PRESSURE: 122 MMHG | BODY MASS INDEX: 26.63 KG/M2

## 2018-01-03 DIAGNOSIS — R10.2 FEMALE PELVIC PAIN: ICD-10-CM

## 2018-01-03 DIAGNOSIS — G43.009 MIGRAINE WITHOUT AURA AND WITHOUT STATUS MIGRAINOSUS, NOT INTRACTABLE: ICD-10-CM

## 2018-01-03 DIAGNOSIS — R10.32 LEFT LOWER QUADRANT PAIN: ICD-10-CM

## 2018-01-03 DIAGNOSIS — N92.1 MENORRHAGIA WITH IRREGULAR CYCLE: ICD-10-CM

## 2018-01-03 DIAGNOSIS — M79.18 MYOFASCIAL PAIN: Primary | ICD-10-CM

## 2018-01-03 DIAGNOSIS — N83.202 CYST OF LEFT OVARY: ICD-10-CM

## 2018-01-03 PROCEDURE — 99999 PR PBB SHADOW E&M-EST. PATIENT-LVL III: CPT | Mod: PBBFAC,,, | Performed by: OBSTETRICS & GYNECOLOGY

## 2018-01-03 PROCEDURE — 99213 OFFICE O/P EST LOW 20 MIN: CPT | Mod: S$PBB,,, | Performed by: OBSTETRICS & GYNECOLOGY

## 2018-01-03 PROCEDURE — 99213 OFFICE O/P EST LOW 20 MIN: CPT | Mod: PBBFAC | Performed by: OBSTETRICS & GYNECOLOGY

## 2018-01-03 RX ORDER — ONDANSETRON 4 MG/1
4 TABLET, ORALLY DISINTEGRATING ORAL EVERY 6 HOURS PRN
Qty: 10 TABLET | Refills: 1 | Status: SHIPPED | OUTPATIENT
Start: 2018-01-03 | End: 2018-12-13

## 2018-01-03 RX ORDER — IBUPROFEN 800 MG/1
800 TABLET ORAL EVERY 8 HOURS PRN
Qty: 30 TABLET | Refills: 0 | Status: SHIPPED | OUTPATIENT
Start: 2018-01-03 | End: 2018-01-03 | Stop reason: SDUPTHER

## 2018-01-03 RX ORDER — POLYETHYLENE GLYCOL 3350 17 G/17G
POWDER, FOR SOLUTION ORAL
COMMUNITY
Start: 2017-12-27 | End: 2018-12-13

## 2018-01-03 RX ORDER — ONDANSETRON 4 MG/1
4 TABLET, ORALLY DISINTEGRATING ORAL EVERY 6 HOURS PRN
Qty: 10 TABLET | Refills: 1 | Status: SHIPPED | OUTPATIENT
Start: 2018-01-03 | End: 2018-01-03 | Stop reason: SDUPTHER

## 2018-01-03 RX ORDER — IBUPROFEN 800 MG/1
800 TABLET ORAL EVERY 8 HOURS PRN
Qty: 30 TABLET | Refills: 1 | Status: SHIPPED | OUTPATIENT
Start: 2018-01-03 | End: 2018-03-06

## 2018-01-03 NOTE — PROGRESS NOTES
"  Subjective:       Patient ID: Crystal Tsai is a 27 y.o. female.    Chief Complaint:  Vaginal Bleeding (irregular bleeding pattern)      History of Present Illness  HPI  Patient again comes in today with multiple complaints  1.  Has had irregular bleeding.  "A lot yesterday" with significant pain.  Could not sleep at night.  Could not lift up her child.  Could not comfortably having intercourse  2.  Had "a lot of some disgusting clear discharge yesterday."  None since.  Wants to know what it was.  3.  Has had significant pain in lower abdomen, especially lower left quadrant toward the middle of her symphysis today.  4.  Still with bladder pain and urinary symptoms.  5.  Still worries a lot about her ovarian cysts.  Would like to get another ultrasound for follow-up soon.    Denies fever or chills.    Frequent nausea with migraine headache.    Wants to know if there are something to do to stop the pain.  Has been exercising vigorously.  "I need to work out!"    "My mother thinks I should apply for disability now.  I could not work for the past month!"    No IC per Urology.  No hernia per General Surgery.      GYN & OB History  No LMP recorded. Patient is not currently having periods (Reason: Other).   Date of Last Pap: 2013    OB History    Para Term  AB Living   2 2 2     2   SAB TAB Ectopic Multiple Live Births           2      # Outcome Date GA Lbr Tyler/2nd Weight Sex Delivery Anes PTL Lv   2 Term 14 40w0d  3.317 kg (7 lb 5 oz) F   N RAJ   1 Term 07 40w0d  3.26 kg (7 lb 3 oz) F Vag-Spont EPI N RAJ        Past Medical History:   Diagnosis Date    Interstitial cystitis     Migraine        Past Surgical History:   Procedure Laterality Date    ABDOMINAL SURGERY      [place] EXPLORATORY OF ABDOMEN         Family History   Problem Relation Age of Onset    Cancer Mother     Diabetes Father        Social History     Social History    Marital status: Single     Spouse name: N/A "    Number of children: N/A    Years of education: N/A     Social History Main Topics    Smoking status: Current Every Day Smoker     Packs/day: 0.50    Smokeless tobacco: Never Used    Alcohol use No      Comment: occassionally    Drug use: No    Sexual activity: Yes     Partners: Male     Birth control/ protection: None     Other Topics Concern    None     Social History Narrative    She has a high school diploma. Patient is currently not working. She has been together with the same person for over 3 years. He works as a .                    Current Outpatient Prescriptions   Medication Sig Dispense Refill    butalbital-acetaminophen-caffeine -40 mg (FIORICET, ESGIC) -40 mg per tablet Take 1 tablet by mouth every 4 (four) hours as needed for Pain. 30 tablet 0    hydrOXYzine pamoate (VISTARIL) 25 MG Cap TK 1 C PO QID PRA  1    norethindrone-ethinyl estradiol (OVCON) 0.4-35 mg-mcg per tablet Take 1 tablet by mouth once daily. 28 tablet 6    ondansetron (ZOFRAN-ODT) 4 MG TbDL Take 1 tablet (4 mg total) by mouth every 6 (six) hours as needed. 10 tablet 1    polyethylene glycol (GLYCOLAX) 17 gram/dose powder       sertraline (ZOLOFT) 100 MG tablet TK 1/2 T PO QD FOR 7 DAYS THEN TK 1 T PO QD  1    ibuprofen (ADVIL,MOTRIN) 800 MG tablet Take 1 tablet (800 mg total) by mouth every 8 (eight) hours as needed for Pain. 30 tablet 1     No current facility-administered medications for this visit.        Review of patient's allergies indicates:  No Known Allergies    Review of Systems  Review of Systems   Constitutional: Positive for fatigue. Negative for activity change, appetite change, chills, fever and unexpected weight change.   HENT: Negative for mouth sores.    Respiratory: Negative for cough, shortness of breath and wheezing.    Cardiovascular: Negative for chest pain and palpitations.   Gastrointestinal: Positive for abdominal pain. Negative for bloating, blood in stool, constipation,  "nausea and vomiting.        Left upper and lower quadrant pain.  More over symphysis in the midline.  Having a bulge on the left side of lower abdomen.  "Comes and goes"   Endocrine: Negative for diabetes and hot flashes.   Genitourinary: Positive for dyspareunia, frequency and pelvic pain. Negative for hematuria, menorrhagia, menstrual problem, urgency, vaginal bleeding, vaginal discharge, vaginal pain, dysmenorrhea, urinary incontinence, postcoital bleeding and vaginal odor.   Musculoskeletal: Negative for back pain and myalgias.   Skin:  Negative for rash.   Neurological: Negative for seizures and headaches.   Psychiatric/Behavioral: Positive for sleep disturbance. Negative for depression. The patient is nervous/anxious.         Irritated   Breast: Negative for breast mass, breast pain and nipple discharge          Objective:    Physical Exam:   Constitutional: She appears well-developed and well-nourished. No distress.    HENT:   Head: Normocephalic and atraumatic.    Eyes: EOM are normal.    Neck: Normal range of motion.    Cardiovascular: Normal rate.     Pulmonary/Chest: Effort normal. No respiratory distress.        Abdominal: Soft. She exhibits no distension. There is no tenderness. There is no rebound and no guarding.   Tender over left side, especially left lower quadrant over left pubic symphysis.  No masses palpable.    No evidence of hernia today       Genitourinary:   Genitourinary Comments: Vulva without any obvious lesions.  Vaginal vault with good support.  Minimal discharge noted.  No obvious lesion.  Cervix is without any cervical motion tenderness.  No obvious lesion.  Uterus is small, non-tender, normal contour.  Adnexa is without any masses or tenderness.    Tender bladder.             Musculoskeletal: Normal range of motion.       Neurological: She is alert.    Skin: Skin is warm and dry.    Psychiatric:   Crying in pain.  Frustrated  Angry  Irritated            Assessment:        1. " Myofascial pain    2. Female pelvic pain    3. Menorrhagia with irregular cycle    4. Left lower quadrant pain    5. Cyst of left ovary    6. Migraine without aura and without status migrainosus, not intractable             Plan:      I have extensively discussed with the patient regarding her condition  She is frustrated.  She would like for her pain to stop  Motrin given  Refer to Pain clinic  Needs to cut back on vigorous exercise.    She is worried about her ovarian cysts growing and causing more pain.  Repeat ultrasound ordered    Back as needed

## 2018-01-07 ENCOUNTER — HOSPITAL ENCOUNTER (EMERGENCY)
Facility: HOSPITAL | Age: 28
Discharge: HOME | End: 2018-01-07
Attending: EMERGENCY MEDICINE
Payer: MEDICAID

## 2018-01-07 VITALS
BODY MASS INDEX: 26.33 KG/M2 | WEIGHT: 158 LBS | SYSTOLIC BLOOD PRESSURE: 120 MMHG | OXYGEN SATURATION: 100 % | HEIGHT: 65 IN | TEMPERATURE: 97 F | HEART RATE: 98 BPM | DIASTOLIC BLOOD PRESSURE: 74 MMHG | RESPIRATION RATE: 20 BRPM

## 2018-01-07 DIAGNOSIS — N83.201 RIGHT OVARIAN CYST: Primary | ICD-10-CM

## 2018-01-07 DIAGNOSIS — R10.2 PELVIC PRESSURE IN FEMALE: ICD-10-CM

## 2018-01-07 DIAGNOSIS — R11.2 NAUSEA VOMITING AND DIARRHEA: ICD-10-CM

## 2018-01-07 DIAGNOSIS — R19.7 NAUSEA VOMITING AND DIARRHEA: ICD-10-CM

## 2018-01-07 LAB
B-HCG UR QL: NEGATIVE
BACTERIA GENITAL QL WET PREP: ABNORMAL
BILIRUB UR QL STRIP: NEGATIVE
CLARITY UR: CLEAR
CLUE CELLS VAG QL WET PREP: ABNORMAL
COLOR UR: YELLOW
CTP QC/QA: YES
FILAMENT FUNGI VAG WET PREP-#/AREA: ABNORMAL
GLUCOSE UR QL STRIP: NEGATIVE
HGB UR QL STRIP: NEGATIVE
KETONES UR QL STRIP: NEGATIVE
LEUKOCYTE ESTERASE UR QL STRIP: NEGATIVE
NITRITE UR QL STRIP: NEGATIVE
PH UR STRIP: 6 [PH] (ref 5–8)
PROT UR QL STRIP: NEGATIVE
SP GR UR STRIP: 1.02 (ref 1–1.03)
SPECIMEN SOURCE: ABNORMAL
T VAGINALIS GENITAL QL WET PREP: ABNORMAL
URN SPEC COLLECT METH UR: NORMAL
UROBILINOGEN UR STRIP-ACNC: NEGATIVE EU/DL
WBC #/AREA VAG WET PREP: ABNORMAL
YEAST GENITAL QL WET PREP: ABNORMAL

## 2018-01-07 PROCEDURE — 87491 CHLMYD TRACH DNA AMP PROBE: CPT

## 2018-01-07 PROCEDURE — 87210 SMEAR WET MOUNT SALINE/INK: CPT

## 2018-01-07 PROCEDURE — 81003 URINALYSIS AUTO W/O SCOPE: CPT

## 2018-01-07 PROCEDURE — 25000003 PHARM REV CODE 250: Performed by: NURSE PRACTITIONER

## 2018-01-07 PROCEDURE — 99284 EMERGENCY DEPT VISIT MOD MDM: CPT

## 2018-01-07 PROCEDURE — 81025 URINE PREGNANCY TEST: CPT | Performed by: EMERGENCY MEDICINE

## 2018-01-07 RX ORDER — HYDROCODONE BITARTRATE AND ACETAMINOPHEN 5; 325 MG/1; MG/1
1 TABLET ORAL EVERY 4 HOURS PRN
Qty: 15 TABLET | Refills: 0 | Status: SHIPPED | OUTPATIENT
Start: 2018-01-07 | End: 2018-01-17

## 2018-01-07 RX ORDER — DICYCLOMINE HYDROCHLORIDE 10 MG/1
10 CAPSULE ORAL
Status: ON HOLD | COMMUNITY
End: 2018-09-11 | Stop reason: HOSPADM

## 2018-01-07 RX ORDER — NAPROXEN 500 MG/1
500 TABLET ORAL ONCE
Status: COMPLETED | OUTPATIENT
Start: 2018-01-07 | End: 2018-01-07

## 2018-01-07 RX ADMIN — NAPROXEN 500 MG: 500 TABLET ORAL at 10:01

## 2018-01-07 NOTE — DISCHARGE INSTRUCTIONS
Your ultrasound shows that you have a 2.5 cm ovarian cyst on the right side.    Please take Norco for pain, as needed. Please do not take Norco while working, drinking alcohol, driving/operating heavy machinery, swimming. It may cause drowsiness, impair judgment, and reduce physical capabilities.    It is possible that your interstitial cystitis is also contributing to her symptoms.  Follow-up with urology for further evaluation of symptoms if no significant improvement.    Return to the emergency room for any new or worsening symptoms or concerns.

## 2018-01-07 NOTE — ED PROVIDER NOTES
"Encounter Date: 1/7/2018    SCRIBE #1 NOTE: I, Eneida Landry, am scribing for, and in the presence of,  Maru Orona NP. I have scribed the following portions of the note - Other sections scribed: ROS and HPI.       History     Chief Complaint   Patient presents with    Abdominal Pain     lower center/suprapubic area pain radiating to back.  states has cyst and bladder problems but pain isn't usually this bad.  +n/v/d and fever (102)  yesterday.      CC: Abdominal Pain  HPI: This 27 y.o. female with a past medical history of Interstitial cystitis; ovarian cyst and Migraine, presents to the ED  pelvic pain for x1  month progressively worse and severe (10/10) in last 2 days radiating to L flank, lower back and pelvis. Pressure to pelvis worse with moving and standing up. She notes post void pressure. She also notes gush of vaginal discharge yesterday with her standing up. Denies any urinary sx. She reports associated nausea, emesis x5 yesterday and x5 diarrhea since yesterday. She also notes fever of "102" yesterday.  She is seen by Dr. Robles (OB/GYN) for her current complaint. He ordered outpatient US. She was prescribed Ibuprofen, phenergan, bentyl in past. She denies any current problems related to her IC but she has had taken medication in past for it.       The history is provided by the patient. No  was used.     Review of patient's allergies indicates:  No Known Allergies  Past Medical History:   Diagnosis Date    Interstitial cystitis     Migraine      Past Surgical History:   Procedure Laterality Date    ABDOMINAL SURGERY      [place] EXPLORATORY OF ABDOMEN       Family History   Problem Relation Age of Onset    Cancer Mother     Diabetes Father      Social History   Substance Use Topics    Smoking status: Current Every Day Smoker     Packs/day: 0.50    Smokeless tobacco: Never Used    Alcohol use 0.0 oz/week      Comment: occassionally     Review of Systems   Constitutional: Negative for " chills and fever.   HENT: Negative for congestion.    Respiratory: Negative for cough and shortness of breath.    Cardiovascular: Negative for chest pain.   Gastrointestinal: Positive for abdominal pain (pressure to pelvic region), diarrhea (x5 episodes yesterday), nausea and vomiting (x5 episodes yesterday).        No hematochezia   Genitourinary: Positive for pelvic pain and vaginal discharge. Negative for difficulty urinating, dysuria and frequency.   Musculoskeletal: Negative for back pain.   Skin: Negative for rash.   Neurological: Negative for weakness, numbness and headaches.       Physical Exam     Initial Vitals [01/07/18 0950]   BP Pulse Resp Temp SpO2   115/77 96 15 98 °F (36.7 °C) 100 %      MAP       89.67         Physical Exam    Nursing note and vitals reviewed.  Constitutional: She appears well-developed and well-nourished. She is not diaphoretic.   Eyes: Conjunctivae and EOM are normal. Pupils are equal, round, and reactive to light.   Neck: Normal range of motion. Neck supple.   Pulmonary/Chest: No respiratory distress.   Abdominal: Soft. Normal appearance. There is no hepatosplenomegaly. There is tenderness in the suprapubic area and left lower quadrant. There is no rigidity, no rebound, no guarding, no CVA tenderness, no tenderness at McBurney's point and negative Freeman's sign. No hernia. Hernia confirmed negative in the right inguinal area and confirmed negative in the left inguinal area.   Genitourinary: Pelvic exam was performed with patient supine. There is no rash or tenderness on the right labia. There is no rash or tenderness on the left labia.   Genitourinary Comments: External genitalia normal in appearance.  No cervical motion tenderness, adnexal masses.   Musculoskeletal: Normal range of motion.   Lymphadenopathy:        Right: No inguinal adenopathy present.        Left: No inguinal adenopathy present.   Neurological: She is alert and oriented to person, place, and time. She has  "normal strength.   Skin: Skin is warm and dry.   Psychiatric: She has a normal mood and affect.         ED Course   Procedures  Labs Reviewed   VAGINAL SCREEN - Abnormal; Notable for the following:        Result Value    WBC - Vaginal Screen Rare (*)     Bacteria - Vaginal Screen Rare (*)     All other components within normal limits   C. TRACHOMATIS/N. GONORRHOEAE BY AMP DNA   URINALYSIS   POCT URINE PREGNANCY                Additional MDM:   Comments: This is an urgent evaluation of a 27-year-old female that presents to the emergency room complaining of abdominal pain for approximately 1 month.  Patient reports symptoms have progressively worsened in the past 2 days with associated fever, nausea, vomiting, and diarrhea.  Patient reports that there is severe pelvic "pressure" that is radiating to her lower back, her thighs, and her left flank.  She also reports a history of ovarian cysts and interstitial cystitis, but symptoms have never been this severe.  She has been seen by urology (Dr. Acevedo) and OB/GYN (Dr. Johnson) for prior episodes of similar pain, though today's pain is more severe.  She takes Vistaril, promethazine, Bentyl, and ibuprofen at home without improvement of symptoms.  She is also been previously prescribed tramadol for pain, which have not helped.  On exam, she appears uncomfortable but is nontoxic and afebrile with normal vital signs.  Her abdomen is soft, nondistended, with active bowel sounds.  She has mild to moderate tenderness to the left lower quadrant and suprapubic region.  There is no guarding or rigidity.  Negative Freeman's and McBurney sign.  UPT is negative.  Differentials included: Gastroenteritis, interstitial cystitis, UTI, vaginal infection, ovarian cysts, ruptured ovarian cyst, ovarian torsion, tubo-ovarian abscess, uterine fibroids, endometriosis.  I carefully considered but doubt renal colic, appendicitis, diverticulitis.  Pelvic exam was grossly unremarkable today.  There " was no evidence of cervicitis, but GC culture is pending.  There was no adnexal masses appreciated.  Her urine does not appear infected.  Pelvic ultrasound was performed which revealed a 2.5 cm ovarian cyst on the right side.  There was no evidence of torsion mentioned.  There is no significant free fluid identified.  Based on the patient's clinical presentation today, I suspect that her symptoms are most likely related to a ruptured ovarian cysts.  Interstitial cystitis may also be contributory.  I highly doubt acute surgical abdomen and see no indication for further workup today.  Will Rx pain control and advised patient to follow up with OB/GYN and urology if no significant improvement within 2-3 days.  Return precautions.  .          Scribe Attestation:   Scribe #1: I performed the above scribed service and the documentation accurately describes the services I performed. I attest to the accuracy of the note.    Attending Attestation:           Physician Attestation for Scribe:  Physician Attestation Statement for Scribe #1: I, Maru Orona NP, reviewed documentation, as scribed by Eneida Landry in my presence, and it is both accurate and complete.                 ED Course      Clinical Impression:   The primary encounter diagnosis was Right ovarian cyst. Diagnoses of Nausea vomiting and diarrhea and Pelvic pressure in female were also pertinent to this visit.    Disposition:   Disposition: Discharged  Condition: Stable                        Maru Orona NP  01/07/18 1328

## 2018-01-07 NOTE — ED TRIAGE NOTES
"Pt. Reports she has been having abdominal pain for the last 3 days, pt. Reports experiencing some n/v/d, she reports she has not had any episodes today. Pt. states "it feels like her bladder has weights attached to it," pt. Also reports abdominal cramping. Pt. States she has a history of cyst on her ovary and reports that there are no current interventions in place for removal of the cyst. Pt. Reports pain 10/10, she is tearful but calm and in no acute distress.  "

## 2018-01-08 LAB
C TRACH DNA SPEC QL NAA+PROBE: NOT DETECTED
N GONORRHOEA DNA SPEC QL NAA+PROBE: NOT DETECTED

## 2018-03-06 ENCOUNTER — HOSPITAL ENCOUNTER (EMERGENCY)
Facility: OTHER | Age: 28
Discharge: HOME | End: 2018-03-06
Attending: EMERGENCY MEDICINE
Payer: MEDICAID

## 2018-03-06 VITALS
WEIGHT: 156 LBS | BODY MASS INDEX: 25.99 KG/M2 | HEIGHT: 65 IN | DIASTOLIC BLOOD PRESSURE: 81 MMHG | TEMPERATURE: 98 F | SYSTOLIC BLOOD PRESSURE: 136 MMHG | RESPIRATION RATE: 17 BRPM | OXYGEN SATURATION: 100 % | HEART RATE: 86 BPM

## 2018-03-06 DIAGNOSIS — N30.01 ACUTE CYSTITIS WITH HEMATURIA: Primary | ICD-10-CM

## 2018-03-06 LAB
ALBUMIN SERPL-MCNC: 3.5 G/DL (ref 3.3–5.5)
ALP SERPL-CCNC: 40 U/L (ref 42–141)
B-HCG UR QL: NEGATIVE
BILIRUB SERPL-MCNC: 0.4 MG/DL (ref 0.2–1.6)
BILIRUBIN, POC UA: NEGATIVE
BLOOD, POC UA: ABNORMAL
BUN SERPL-MCNC: 9 MG/DL (ref 7–22)
CALCIUM SERPL-MCNC: 8.9 MG/DL (ref 8–10.3)
CHLORIDE SERPL-SCNC: 100 MMOL/L (ref 98–108)
CLARITY, POC UA: CLEAR
COLOR, POC UA: YELLOW
CREAT SERPL-MCNC: 0.9 MG/DL (ref 0.6–1.2)
CTP QC/QA: YES
GLUCOSE SERPL-MCNC: 88 MG/DL (ref 73–118)
GLUCOSE, POC UA: NEGATIVE
KETONES, POC UA: NEGATIVE
LEUKOCYTE EST, POC UA: ABNORMAL
NITRITE, POC UA: NEGATIVE
PH UR STRIP: 6 [PH]
POC ALT (SGPT): 17 U/L (ref 10–47)
POC AST (SGOT): 22 U/L (ref 11–38)
POC PTINR: 1.1 (ref 0.9–1.2)
POC PTWBT: 12.9 SEC (ref 9.7–14.3)
POC TCO2: 28 MMOL/L (ref 18–33)
POTASSIUM BLD-SCNC: 3.9 MMOL/L (ref 3.6–5.1)
PROTEIN, POC UA: NEGATIVE
PROTEIN, POC: 7.3 G/DL (ref 6.4–8.1)
SAMPLE: NORMAL
SODIUM BLD-SCNC: 142 MMOL/L (ref 128–145)
SPECIFIC GRAVITY, POC UA: <=1.005
UROBILINOGEN, POC UA: 0.2 E.U./DL

## 2018-03-06 PROCEDURE — 96375 TX/PRO/DX INJ NEW DRUG ADDON: CPT

## 2018-03-06 PROCEDURE — 81025 URINE PREGNANCY TEST: CPT | Performed by: EMERGENCY MEDICINE

## 2018-03-06 PROCEDURE — 87077 CULTURE AEROBIC IDENTIFY: CPT

## 2018-03-06 PROCEDURE — 81003 URINALYSIS AUTO W/O SCOPE: CPT

## 2018-03-06 PROCEDURE — 63600175 PHARM REV CODE 636 W HCPCS: Performed by: EMERGENCY MEDICINE

## 2018-03-06 PROCEDURE — 85610 PROTHROMBIN TIME: CPT

## 2018-03-06 PROCEDURE — 63600175 PHARM REV CODE 636 W HCPCS

## 2018-03-06 PROCEDURE — 87088 URINE BACTERIA CULTURE: CPT

## 2018-03-06 PROCEDURE — 96374 THER/PROPH/DIAG INJ IV PUSH: CPT

## 2018-03-06 PROCEDURE — 87086 URINE CULTURE/COLONY COUNT: CPT

## 2018-03-06 PROCEDURE — 80053 COMPREHEN METABOLIC PANEL: CPT

## 2018-03-06 PROCEDURE — 85025 COMPLETE CBC W/AUTO DIFF WBC: CPT

## 2018-03-06 PROCEDURE — 25000003 PHARM REV CODE 250: Performed by: EMERGENCY MEDICINE

## 2018-03-06 PROCEDURE — 96361 HYDRATE IV INFUSION ADD-ON: CPT

## 2018-03-06 PROCEDURE — 87186 SC STD MICRODIL/AGAR DIL: CPT

## 2018-03-06 PROCEDURE — 99284 EMERGENCY DEPT VISIT MOD MDM: CPT | Mod: 25

## 2018-03-06 RX ORDER — LIDOCAINE HYDROCHLORIDE 20 MG/ML
JELLY TOPICAL
Status: COMPLETED | OUTPATIENT
Start: 2018-03-06 | End: 2018-03-06

## 2018-03-06 RX ORDER — KETOROLAC TROMETHAMINE 30 MG/ML
15 INJECTION, SOLUTION INTRAMUSCULAR; INTRAVENOUS
Status: COMPLETED | OUTPATIENT
Start: 2018-03-06 | End: 2018-03-06

## 2018-03-06 RX ORDER — NITROFURANTOIN 25; 75 MG/1; MG/1
100 CAPSULE ORAL 2 TIMES DAILY
Qty: 10 CAPSULE | Refills: 0 | Status: SHIPPED | OUTPATIENT
Start: 2018-03-06 | End: 2018-03-06 | Stop reason: CLARIF

## 2018-03-06 RX ORDER — ONDANSETRON 2 MG/ML
8 INJECTION INTRAMUSCULAR; INTRAVENOUS
Status: COMPLETED | OUTPATIENT
Start: 2018-03-06 | End: 2018-03-06

## 2018-03-06 RX ORDER — CEFTRIAXONE 1 G/1
INJECTION, POWDER, FOR SOLUTION INTRAMUSCULAR; INTRAVENOUS
Status: COMPLETED
Start: 2018-03-06 | End: 2018-03-06

## 2018-03-06 RX ORDER — IBUPROFEN 600 MG/1
600 TABLET ORAL EVERY 6 HOURS PRN
Qty: 30 TABLET | Refills: 0 | OUTPATIENT
Start: 2018-03-06 | End: 2018-09-08

## 2018-03-06 RX ORDER — IBUPROFEN 600 MG/1
600 TABLET ORAL EVERY 6 HOURS PRN
Qty: 20 TABLET | Refills: 0 | Status: SHIPPED | OUTPATIENT
Start: 2018-03-06 | End: 2018-03-06

## 2018-03-06 RX ORDER — PHENAZOPYRIDINE HYDROCHLORIDE 200 MG/1
200 TABLET, FILM COATED ORAL 3 TIMES DAILY PRN
Qty: 6 TABLET | Refills: 0 | Status: SHIPPED | OUTPATIENT
Start: 2018-03-06 | End: 2018-03-16

## 2018-03-06 RX ORDER — AMOXICILLIN AND CLAVULANATE POTASSIUM 875; 125 MG/1; MG/1
1 TABLET, FILM COATED ORAL EVERY 12 HOURS
Qty: 28 TABLET | Refills: 0 | Status: SHIPPED | OUTPATIENT
Start: 2018-03-06 | End: 2018-03-20

## 2018-03-06 RX ORDER — PHENAZOPYRIDINE HYDROCHLORIDE 100 MG/1
200 TABLET, FILM COATED ORAL
Status: COMPLETED | OUTPATIENT
Start: 2018-03-06 | End: 2018-03-06

## 2018-03-06 RX ORDER — CEFTRIAXONE 1 G/1
1 INJECTION, POWDER, FOR SOLUTION INTRAMUSCULAR; INTRAVENOUS
Status: COMPLETED | OUTPATIENT
Start: 2018-03-06 | End: 2018-03-06

## 2018-03-06 RX ORDER — PHENAZOPYRIDINE HYDROCHLORIDE 200 MG/1
200 TABLET, FILM COATED ORAL 3 TIMES DAILY
Qty: 6 TABLET | Refills: 0 | Status: SHIPPED | OUTPATIENT
Start: 2018-03-06 | End: 2018-03-06

## 2018-03-06 RX ADMIN — SODIUM CHLORIDE 1000 ML: 0.9 INJECTION, SOLUTION INTRAVENOUS at 07:03

## 2018-03-06 RX ADMIN — CEFTRIAXONE SODIUM 1 G: 1 INJECTION, POWDER, FOR SOLUTION INTRAMUSCULAR; INTRAVENOUS at 08:03

## 2018-03-06 RX ADMIN — PHENAZOPYRIDINE HYDROCHLORIDE 200 MG: 100 TABLET ORAL at 07:03

## 2018-03-06 RX ADMIN — KETOROLAC TROMETHAMINE 15 MG: 30 INJECTION, SOLUTION INTRAMUSCULAR at 07:03

## 2018-03-06 RX ADMIN — ONDANSETRON 8 MG: 2 INJECTION, SOLUTION INTRAMUSCULAR; INTRAVENOUS at 07:03

## 2018-03-06 RX ADMIN — CEFTRIAXONE 1 G: 1 INJECTION, POWDER, FOR SOLUTION INTRAMUSCULAR; INTRAVENOUS at 08:03

## 2018-03-06 RX ADMIN — LIDOCAINE HYDROCHLORIDE 5 ML: 20 JELLY TOPICAL at 08:03

## 2018-03-06 NOTE — ED PROVIDER NOTES
Encounter Date: 3/6/2018       History     Chief Complaint   Patient presents with    Flank Pain     pt presents to ED with c/o left flank pain that started earlier this morning, woke pt out of sleep. Also having urinary frequency, urgency with bloody sediment noted.      Crystal Tsai is a 27 y.o. female who presents to the Emergency Department with  left-sided flank pain, pain with urination, blood in urine.  Patient states she woke up 4 AM today with the symptoms.  Patient's 3 nauseated this time.  Patient states she's never had a feeling like this before      The history is provided by the patient.   Dysuria    This is a new problem. The current episode started today. The problem occurs every urination. The problem has been unchanged. The quality of the pain is described as burning. The pain is at a severity of 9/10. Associated symptoms include chills, frequency, hesitancy and flank pain. Pertinent negatives include no nausea. She has tried nothing for the symptoms.     Review of patient's allergies indicates:  No Known Allergies  Past Medical History:   Diagnosis Date    Interstitial cystitis     Migraine      Past Surgical History:   Procedure Laterality Date    ABDOMINAL SURGERY      [place] EXPLORATORY OF ABDOMEN       Family History   Problem Relation Age of Onset    Cancer Mother     Diabetes Father      Social History   Substance Use Topics    Smoking status: Current Every Day Smoker     Packs/day: 0.50    Smokeless tobacco: Never Used    Alcohol use 0.0 oz/week      Comment: occassionally     Review of Systems   Constitutional: Positive for chills. Negative for fever.   HENT: Negative for sore throat.    Respiratory: Negative for shortness of breath.    Cardiovascular: Negative for chest pain.   Gastrointestinal: Negative for nausea.   Genitourinary: Positive for dysuria, flank pain, frequency and hesitancy.   Musculoskeletal: Negative for back pain.   Skin: Negative for rash.    Neurological: Negative for weakness.   Hematological: Does not bruise/bleed easily.   All other systems reviewed and are negative.      Physical Exam     Initial Vitals [03/06/18 0701]   BP Pulse Resp Temp SpO2   120/69 85 16 97.8 °F (36.6 °C) 100 %      MAP       86         Physical Exam    Nursing note and vitals reviewed.  Constitutional: She appears well-developed and well-nourished.   HENT:   Head: Normocephalic and atraumatic.   Right Ear: External ear normal.   Left Ear: External ear normal.   Eyes: Conjunctivae and EOM are normal. Pupils are equal, round, and reactive to light. Right eye exhibits no discharge. Left eye exhibits no discharge.   Neck: Normal range of motion. Neck supple. No JVD present.   Cardiovascular: Normal rate, regular rhythm, normal heart sounds and intact distal pulses. Exam reveals no gallop and no friction rub.    No murmur heard.  Pulmonary/Chest: Breath sounds normal. No respiratory distress. She has no wheezes. She has no rhonchi. She has no rales. She exhibits no tenderness.   Abdominal: Soft. Bowel sounds are normal. She exhibits no distension and no mass. There is tenderness in the left lower quadrant. There is CVA tenderness. There is no rigidity, no rebound, no guarding and negative Freeman's sign.       Musculoskeletal: Normal range of motion. She exhibits no edema or tenderness.   Lymphadenopathy:     She has no cervical adenopathy.   Neurological: She is alert and oriented to person, place, and time. She has normal strength and normal reflexes. She displays normal reflexes. No cranial nerve deficit or sensory deficit.   Skin: Skin is warm and dry. Capillary refill takes less than 2 seconds. No rash noted. No pallor.   Psychiatric: She has a normal mood and affect.         ED Course   Procedures  Labs Reviewed   POCT URINALYSIS W/O SCOPE - Abnormal; Notable for the following:        Result Value    Glucose, UA Negative (*)     Bilirubin, UA Negative (*)     Ketones, UA  Negative (*)     Spec Grav UA <=1.005 (*)     Blood, UA 2+ (*)     Protein, UA Negative (*)     Nitrite, UA Negative (*)     Leukocytes, UA 3+ (*)     All other components within normal limits   POCT URINE PREGNANCY   POCT URINALYSIS W/O SCOPE   POCT CBC   POCT CMP   POCT PROTIME-INR                             Medical decision making   Chief complaint: Left-sided flank pain, dysuria, and blood in the urine.  Differential diagnosis: Retract infection, pyelonephritis, flank pain, and renal stone  Treatment in the ED Physical Exam, Toradol for pain, Zofran for nausea, and Pyridium for dysuria  Patient reports total resolution of symptoms after medication.    Discussed labs, and imaging results.    Fill and take prescriptions as directed.  Return to the ED if symptoms worsen or do not resolve.   Answered questions and discussed discharge plan.    Patient feels much better and is ready for discharge.  Follow up with PCP in 1 days.       Clinical Impression:   The encounter diagnosis was Acute cystitis with hematuria.                           Fang Bryant DO  03/08/18 6799

## 2018-03-06 NOTE — ED NOTES
"Pt reports nausea, flank and back pain.  Pt also reports "peeing out fire urine", extreme burning w urination.  Reports some blood and mucus in urine, w increased pressure.     "

## 2018-03-08 LAB — BACTERIA UR CULT: NORMAL

## 2018-03-12 ENCOUNTER — TELEPHONE (OUTPATIENT)
Dept: OBSTETRICS AND GYNECOLOGY | Facility: CLINIC | Age: 28
End: 2018-03-12

## 2018-03-12 NOTE — TELEPHONE ENCOUNTER
----- Message from Domonique Reeves sent at 3/12/2018  8:33 AM CDT -----  Contact: [phone]  Pt is requesting to speak to you regarding her appt and referral to pain management. Pls call pt [phone]. Thanks.......Yancy

## 2018-05-01 ENCOUNTER — HOSPITAL ENCOUNTER (EMERGENCY)
Facility: HOSPITAL | Age: 28
Discharge: HOME | End: 2018-05-01
Attending: EMERGENCY MEDICINE
Payer: MEDICAID

## 2018-05-01 VITALS
HEIGHT: 65 IN | TEMPERATURE: 98 F | BODY MASS INDEX: 24.99 KG/M2 | WEIGHT: 150 LBS | DIASTOLIC BLOOD PRESSURE: 83 MMHG | RESPIRATION RATE: 18 BRPM | SYSTOLIC BLOOD PRESSURE: 127 MMHG | OXYGEN SATURATION: 100 % | HEART RATE: 67 BPM

## 2018-05-01 DIAGNOSIS — K59.00 CONSTIPATION, UNSPECIFIED CONSTIPATION TYPE: ICD-10-CM

## 2018-05-01 DIAGNOSIS — R31.9 URINARY TRACT INFECTION WITH HEMATURIA, SITE UNSPECIFIED: Primary | ICD-10-CM

## 2018-05-01 DIAGNOSIS — N39.0 URINARY TRACT INFECTION WITH HEMATURIA, SITE UNSPECIFIED: Primary | ICD-10-CM

## 2018-05-01 LAB
ALBUMIN SERPL-MCNC: 3.7 G/DL (ref 3.3–5.5)
ALP SERPL-CCNC: 43 U/L (ref 42–141)
B-HCG UR QL: NEGATIVE
BILIRUB SERPL-MCNC: 0.4 MG/DL (ref 0.2–1.6)
BILIRUBIN, POC UA: ABNORMAL
BLOOD, POC UA: ABNORMAL
BUN SERPL-MCNC: 7 MG/DL (ref 7–22)
CALCIUM SERPL-MCNC: 9.3 MG/DL (ref 8–10.3)
CHLORIDE SERPL-SCNC: 104 MMOL/L (ref 98–108)
CLARITY, POC UA: ABNORMAL
COLOR, POC UA: YELLOW
CREAT SERPL-MCNC: 0.8 MG/DL (ref 0.6–1.2)
CTP QC/QA: YES
GLUCOSE SERPL-MCNC: 102 MG/DL (ref 73–118)
GLUCOSE, POC UA: NEGATIVE
KETONES, POC UA: NEGATIVE
LEUKOCYTE EST, POC UA: ABNORMAL
NITRITE, POC UA: NEGATIVE
PH UR STRIP: 6 [PH]
POC ALT (SGPT): 29 U/L (ref 10–47)
POC AST (SGOT): 28 U/L (ref 11–38)
POC TCO2: 26 MMOL/L (ref 18–33)
POTASSIUM BLD-SCNC: 3.6 MMOL/L (ref 3.6–5.1)
PROTEIN, POC UA: ABNORMAL
PROTEIN, POC: 7.4 G/DL (ref 6.4–8.1)
SODIUM BLD-SCNC: 139 MMOL/L (ref 128–145)
SPECIFIC GRAVITY, POC UA: 1.02
UROBILINOGEN, POC UA: 0.2 E.U./DL

## 2018-05-01 PROCEDURE — 81025 URINE PREGNANCY TEST: CPT | Performed by: EMERGENCY MEDICINE

## 2018-05-01 PROCEDURE — 96374 THER/PROPH/DIAG INJ IV PUSH: CPT

## 2018-05-01 PROCEDURE — 85025 COMPLETE CBC W/AUTO DIFF WBC: CPT

## 2018-05-01 PROCEDURE — 63600175 PHARM REV CODE 636 W HCPCS: Performed by: NURSE PRACTITIONER

## 2018-05-01 PROCEDURE — 96375 TX/PRO/DX INJ NEW DRUG ADDON: CPT | Mod: 59

## 2018-05-01 PROCEDURE — 99284 EMERGENCY DEPT VISIT MOD MDM: CPT | Mod: 25

## 2018-05-01 PROCEDURE — 25000003 PHARM REV CODE 250: Performed by: NURSE PRACTITIONER

## 2018-05-01 PROCEDURE — 80053 COMPREHEN METABOLIC PANEL: CPT

## 2018-05-01 PROCEDURE — 81003 URINALYSIS AUTO W/O SCOPE: CPT

## 2018-05-01 PROCEDURE — 96361 HYDRATE IV INFUSION ADD-ON: CPT

## 2018-05-01 RX ORDER — SODIUM CHLORIDE 9 MG/ML
1000 INJECTION, SOLUTION INTRAVENOUS
Status: COMPLETED | OUTPATIENT
Start: 2018-05-01 | End: 2018-05-01

## 2018-05-01 RX ORDER — PHENAZOPYRIDINE HYDROCHLORIDE 200 MG/1
200 TABLET, FILM COATED ORAL 3 TIMES DAILY
Qty: 6 TABLET | Refills: 0 | Status: SHIPPED | OUTPATIENT
Start: 2018-05-01 | End: 2018-05-11

## 2018-05-01 RX ORDER — NITROFURANTOIN 25; 75 MG/1; MG/1
100 CAPSULE ORAL 2 TIMES DAILY
Qty: 14 CAPSULE | Refills: 0 | Status: SHIPPED | OUTPATIENT
Start: 2018-05-01 | End: 2018-05-08

## 2018-05-01 RX ORDER — KETOROLAC TROMETHAMINE 30 MG/ML
15 INJECTION, SOLUTION INTRAMUSCULAR; INTRAVENOUS
Status: COMPLETED | OUTPATIENT
Start: 2018-05-01 | End: 2018-05-01

## 2018-05-01 RX ORDER — ONDANSETRON 2 MG/ML
4 INJECTION INTRAMUSCULAR; INTRAVENOUS
Status: COMPLETED | OUTPATIENT
Start: 2018-05-01 | End: 2018-05-01

## 2018-05-01 RX ORDER — PHENAZOPYRIDINE HYDROCHLORIDE 100 MG/1
200 TABLET, FILM COATED ORAL
Status: COMPLETED | OUTPATIENT
Start: 2018-05-01 | End: 2018-05-01

## 2018-05-01 RX ADMIN — PHENAZOPYRIDINE 200 MG: 100 TABLET ORAL at 09:05

## 2018-05-01 RX ADMIN — SODIUM CHLORIDE 1000 ML: 0.9 INJECTION, SOLUTION INTRAVENOUS at 07:05

## 2018-05-01 RX ADMIN — ONDANSETRON 4 MG: 2 INJECTION INTRAMUSCULAR; INTRAVENOUS at 07:05

## 2018-05-01 RX ADMIN — KETOROLAC TROMETHAMINE 15 MG: 30 INJECTION, SOLUTION INTRAMUSCULAR at 07:05

## 2018-05-02 NOTE — ED PROVIDER NOTES
Encounter Date: 5/1/2018       History     Chief Complaint   Patient presents with    Flank Pain     pt presents with c/o L, flank pain, dysuria, incontinence, pelvic pain, chills, oliguria x1 day; Hx of UTI/kidney stones     A 27-year-old female who presents to the ED with left flank pain, dysuria and urinary frequency.  Patient has a history of interest is just cystitis, kidney stones, urinary tract infection.  Patient denies fever chills nausea vomiting.        The history is provided by the patient.   Flank Pain   This is a new problem. The current episode started 6 to 12 hours ago. The problem has been gradually worsening. Associated symptoms include abdominal pain.     Review of patient's allergies indicates:  No Known Allergies  Past Medical History:   Diagnosis Date    Interstitial cystitis     Kidney stone     Migraine     UTI (urinary tract infection)      Past Surgical History:   Procedure Laterality Date    ABDOMINAL SURGERY      [place] EXPLORATORY OF ABDOMEN       Family History   Problem Relation Age of Onset    Cancer Mother     Diabetes Father      Social History   Substance Use Topics    Smoking status: Current Every Day Smoker     Packs/day: 0.50    Smokeless tobacco: Never Used    Alcohol use 0.0 oz/week      Comment: occassionally     Review of Systems   Constitutional: Negative.  Negative for chills and fever.   HENT: Negative.  Negative for congestion.    Eyes: Negative.    Respiratory: Negative.  Negative for chest tightness.    Gastrointestinal: Positive for abdominal pain. Negative for vomiting.   Endocrine: Negative.    Genitourinary: Positive for dysuria, flank pain and frequency. Negative for hematuria.   Musculoskeletal: Negative for back pain and neck pain.   Skin: Negative.  Negative for rash.   Allergic/Immunologic: Negative for immunocompromised state.   Neurological: Negative.  Negative for weakness.   Hematological: Does not bruise/bleed easily.   Psychiatric/Behavioral:  Negative.    All other systems reviewed and are negative.      Physical Exam     Initial Vitals [05/01/18 1920]   BP Pulse Resp Temp SpO2   130/76 86 18 98.2 °F (36.8 °C) 100 %      MAP       94         Physical Exam    Nursing note and vitals reviewed.  Constitutional: Vital signs are normal. She appears well-developed. She is cooperative. She does not appear ill.   HENT:   Head: Normocephalic and atraumatic.   Right Ear: External ear normal.   Left Ear: External ear normal.   Nose: Nose normal.   Mouth/Throat: Oropharynx is clear and moist.   Eyes: Conjunctivae and lids are normal. Pupils are equal, round, and reactive to light.   Neck: Normal range of motion. Neck supple.   Cardiovascular: Normal rate, regular rhythm, S1 normal, S2 normal and normal heart sounds.   Pulmonary/Chest: Effort normal and breath sounds normal.   Abdominal: Soft. Normal appearance and bowel sounds are normal. There is tenderness in the suprapubic area.   Mild CVA tenderness on the left.   Musculoskeletal: Normal range of motion.   From of all extremities   Neurological: She is alert and oriented to person, place, and time.   Skin: Skin is warm, dry and intact.   Psychiatric: She has a normal mood and affect. Her speech is normal. Thought content normal. Cognition and memory are normal.       Imaging Results          CT Renal Stone Study ABD Pelvis WO (Final result)  Result time 05/01/18 20:28:20    Final result by Frank Kilpatrick MD (05/01/18 20:28:20)                 Impression:      1. Minimal hepatomegaly, correlation with LFTs recommended.  2. No findings to suggest obstructive uropathy, correlation with urinalysis as warranted.  3. Several additional findings above.      Electronically signed by: Frank Kilpatrick MD  Date:    05/01/2018  Time:    20:28             Narrative:    EXAMINATION:  CT RENAL STONE STUDY ABD PELVIS WO    CLINICAL HISTORY:  Flank pain, stone disease suspected;    TECHNIQUE:  Low dose axial images, sagittal  and coronal reformations were obtained from the lung bases to the pubic symphysis.  Contrast was not administered.    COMPARISON:  03/06/2018    FINDINGS:  Images of the lower thorax are unremarkable.  The liver is mildly prominent, correlation with LFTs recommended.  The spleen, pancreas, gallbladder and adrenal glands have a grossly unremarkable noncontrast appearance.  There is no biliary dilation or ascites.  No significant abdominal lymphadenopathy.    The kidneys have a grossly unremarkable noncontrast appearance without hydronephrosis or nephrolithiasis.  The bilateral ureters cannot be followed in their entirety to the urinary bladder, no definite ureteral calculi noting phleboliths in the pelvis.  The urinary bladder is unremarkable.  The uterus and adnexa is grossly unremarkable noting a probable dominant follicle or small right ovarian cyst.    There is moderate stool in the colon.  The terminal ileum and appendix are unremarkable.  The small bowel is grossly unremarkable.  No significant free fluid in the pelvis.    No focal osseous destructive process.  Pars defects noted bilaterally at L5-S1 without significant listhesis however there is a disc protrusion at this level, similar to the previous examination.  No significant inguinal lymphadenopathy.                                 ED Course   Procedures  Labs Reviewed   POCT URINALYSIS W/O SCOPE - Abnormal; Notable for the following:        Result Value    Glucose, UA Negative (*)     Bilirubin, UA 1+ (*)     Ketones, UA Negative (*)     Blood, UA 2+ (*)     Protein, UA 1+ (*)     Nitrite, UA Negative (*)     Leukocytes, UA 1+ (*)     All other components within normal limits   POCT CMP - Abnormal; Notable for the following:     POC BUN 7 (*)     All other components within normal limits   POCT URINALYSIS W/O SCOPE   POCT URINE PREGNANCY   POCT CBC   POCT CMP             Medical Decision Making:   History:   Old Records Summarized: other records.        <> Summary of Records: Reviewed previous culture and sensitivity from March 6, 2018.  Initial Assessment:   A 27-year-old female who presents to the ED with complaints of left flank pain, dysuria and urinary frequency.  Patient has a history of interest is just cystitis, urinary tract infection and kidney stones.  Differential Diagnosis:   Urinary tract infection  Pyelonephritis  Kidney stones  Clinical Tests:   Lab Tests: Reviewed and Ordered       <> Summary of Lab: CMP-sodium 139, potassium 3.6, BUNs 7, creatinine 0.8  CBC-WBC 7.8 hemoglobin 12.7, hematocrit 38.0 platelet 164.  Urinalysis bilirubin 1+, blood 2+, protein +1 leukocytes 1+    ED Management:  Physical exam.  UPT.  Urinalysis.  CMP, CBC.  Patient given 1 Liter NS and Toradol 15 mg IV.  Pyridium 200 mg by mouth.  CT of abdomen pelvis renal stone protocol- no acute findings, enlarged liver, moderated stool in colon. Discussed mildly enlarged liver. Liver enzymes normal.   Patient discharged home with Macrobid and Pyridium.  Pt and instructed to take a over-the-counter laxative and increase fiber in diet.  Follow-up with urologist in 2-3 days.  Return to ED for worsening of symptoms.                          Clinical Impression:   The primary encounter diagnosis was Urinary tract infection with hematuria, site unspecified. A diagnosis of Constipation, unspecified constipation type was also pertinent to this visit.                           Layla Bell, AMADOU  05/01/18 7881

## 2018-05-24 ENCOUNTER — OFFICE VISIT (OUTPATIENT)
Dept: PAIN MEDICINE | Facility: CLINIC | Age: 28
End: 2018-05-24
Payer: MEDICAID

## 2018-05-24 VITALS
BODY MASS INDEX: 25.8 KG/M2 | RESPIRATION RATE: 18 BRPM | HEIGHT: 65 IN | HEART RATE: 87 BPM | WEIGHT: 154.88 LBS | DIASTOLIC BLOOD PRESSURE: 55 MMHG | OXYGEN SATURATION: 100 % | SYSTOLIC BLOOD PRESSURE: 118 MMHG

## 2018-05-24 DIAGNOSIS — N30.10 CHRONIC INTERSTITIAL CYSTITIS WITHOUT HEMATURIA: Primary | ICD-10-CM

## 2018-05-24 DIAGNOSIS — R10.2 PELVIC PAIN: ICD-10-CM

## 2018-05-24 PROCEDURE — 99203 OFFICE O/P NEW LOW 30 MIN: CPT | Mod: S$PBB,,, | Performed by: PAIN MEDICINE

## 2018-05-24 PROCEDURE — 99213 OFFICE O/P EST LOW 20 MIN: CPT | Mod: PBBFAC,PN | Performed by: PAIN MEDICINE

## 2018-05-24 PROCEDURE — 99999 PR PBB SHADOW E&M-EST. PATIENT-LVL III: CPT | Mod: PBBFAC,,, | Performed by: PAIN MEDICINE

## 2018-05-24 RX ORDER — GABAPENTIN 300 MG/1
600 CAPSULE ORAL 3 TIMES DAILY
Qty: 180 CAPSULE | Refills: 11 | Status: SHIPPED | OUTPATIENT
Start: 2018-05-24 | End: 2019-05-20

## 2018-05-24 NOTE — LETTER
May 28, 2018      Aman Johnson MD  [address]  [address]  [place] [address]           [facility]  [address]  [place] [address]  Phone: [phone]  Fax: [phone]          Patient: Crystal Tsai   MR Number: [id number]   YOB: 1990   Date of Visit: 5/24/2018       Dear Dr. Aman Johnson:    Thank you for referring Crystal Tsai to me for evaluation. Attached you will find relevant portions of my assessment and plan of care.    If you have questions, please do not hesitate to call me. I look forward to following Crystal Tsai along with you.    Sincerely,    Wei Powell Jr., MD    Enclosure  CC:  No Recipients    If you would like to receive this communication electronically, please contact [email or web address] or [phone] to request more information on [employer] Link access.    For providers and/or their staff who would like to refer a patient to [facility], please contact us through our one-stop-shop provider referral line, [facility], at [phone].    If you feel you have received this communication in error or would no longer like to receive these types of communications, please e-mail [email or web address]

## 2018-05-24 NOTE — PROGRESS NOTES
Subjective:     Patient ID: Crystal Tsai is a 27 y.o. female    Chief Complaint: Pelvic Pain (patient dx w/ myofascial pelvic pain ( L sided) - patient experiences stabbing pain with pressure in lower back pain-previous abdominal surgery - treatment w/ medication, home exercising and heating pads)      Referred by: Aman Johnson MD      HPI:    Initial Encounter (5/24/18):  Crystal Tsai is a 27 y.o. female who presents today with chronic pelvic pain. Pain has been present for about 5-6 years. She has been told that it is due to interstitial cystitis. She reports associated low back pain that is always associated with pelvic pain. .   This pain is described in detail below.    Physical Therapy: Unknown    Non-pharmacologic Treatment: nothing helps         · TENS? No    Pain Medications:         · Currently taking: Ibuprofen    · Has tried in the past:  Gabapentin    · Has not tried: Opioids, Muscle relaxants, TCAs, SNRIs, topical creams    Blood thinners: None    Interventional Therapies: None    Relevant Surgeries: None    Affecting sleep? YEs    Affecting daily activities? yes    Depressive symptoms? yes          · SI/HI? No    Work status:Employed    Pain Scores:    Best:       7/10  Worst:     10/10  Usually:   7/10  Today:    8/10    Review of Systems    Past Medical History:   Diagnosis Date    Interstitial cystitis     Kidney stone     Migraine     UTI (urinary tract infection)        Past Surgical History:   Procedure Laterality Date    ABDOMINAL SURGERY      [place] EXPLORATORY OF ABDOMEN         Social History     Social History    Marital status: Single     Spouse name: N/A    Number of children: N/A    Years of education: N/A     Occupational History    Not on file.     Social History Main Topics    Smoking status: Current Every Day Smoker     Packs/day: 0.50    Smokeless tobacco: Never Used    Alcohol use 0.0 oz/week      Comment: occassionally    Drug use: Yes     Types:  "Marijuana    Sexual activity: Yes     Partners: Male     Birth control/ protection: None     Other Topics Concern    Not on file     Social History Narrative    She has a high school diploma. Patient is currently not working. She has been together with the same person for over 3 years. He works as a .                    Review of patient's allergies indicates:  No Known Allergies    Current Outpatient Prescriptions on File Prior to Visit   Medication Sig Dispense Refill    ibuprofen (ADVIL,MOTRIN) 600 MG tablet Take 1 tablet (600 mg total) by mouth every 6 (six) hours as needed for Pain. 30 tablet 0    norethindrone-ethinyl estradiol (OVCON) 0.4-35 mg-mcg per tablet Take 1 tablet by mouth once daily. 28 tablet 6    polyethylene glycol (GLYCOLAX) 17 gram/dose powder       dicyclomine (BENTYL) 10 MG capsule Take 10 mg by mouth 4 (four) times daily before meals and nightly.      hydrOXYzine pamoate (VISTARIL) 25 MG Cap TK 1 C PO QID PRA  1    ondansetron (ZOFRAN-ODT) 4 MG TbDL Take 1 tablet (4 mg total) by mouth every 6 (six) hours as needed. 10 tablet 1    sertraline (ZOLOFT) 100 MG tablet TK 1/2 T PO QD FOR 7 DAYS THEN TK 1 T PO QD  1     No current facility-administered medications on file prior to visit.        Objective:      BP (!) 118/55   Pulse 87   Resp 18   Ht 5' 5" (1.651 m)   Wt 70.3 kg (154 lb 14.4 oz)   LMP  (Exact Date)   SpO2 100%   BMI 25.78 kg/m²     Exam:  GEN:  Well developed, well nourished.  No acute distress.   HEENT:  No trauma.  Mucous membranes moist.  Nares patent bilaterally.  PSYCH: Normal affect. Thought content appropriate.  CHEST:  Breathing symmetric.  No audible wheezing.  ABD: Soft, non-distended.  SKIN:  Warm, pink, dry.  No rash on exposed areas.    EXT:  No cyanosis, clubbing, or edema.  No color change or changes in nail or hair growth.  NEURO/MUSCULOSKELETAL:  Fully alert, oriented, and appropriate. Speech normal gerry. No cranial nerve deficits.   Gait: " Normal.  No focal motor deficits.       Imaging:  No pertinent imaging availabel for review at this time.    Assessment:       Encounter Diagnoses   Name Primary?    Chronic interstitial cystitis without hematuria Yes    Pelvic pain          Plan:       Crystal Toth was seen today for pelvic pain.    Diagnoses and all orders for this visit:    Chronic interstitial cystitis without hematuria  -     gabapentin (NEURONTIN) 300 MG capsule; Take 2 capsules (600 mg total) by mouth 3 (three) times daily.    Pelvic pain  -     gabapentin (NEURONTIN) 300 MG capsule; Take 2 capsules (600 mg total) by mouth 3 (three) times daily.        Crystal Tsai is a 27 y.o. female with chronic pelvic pain. Diagnosed with IC in the past. Reports having some relief with Gabapentin in the past. Cannot recall the dose.    1. Start Gabapentin 300mg QHS. Gradually titrate to 600mg TID as tolerated.   2. RTC in 6 weeks. At that time we will discuss efficacy of Gabapentin. May consider increasing dose further if appropriate. May also consider pelvic floor therapy.

## 2018-06-16 ENCOUNTER — HOSPITAL ENCOUNTER (EMERGENCY)
Facility: HOSPITAL | Age: 28
Discharge: HOME | End: 2018-06-16
Attending: EMERGENCY MEDICINE
Payer: MEDICAID

## 2018-06-16 VITALS
OXYGEN SATURATION: 99 % | SYSTOLIC BLOOD PRESSURE: 111 MMHG | RESPIRATION RATE: 14 BRPM | HEART RATE: 84 BPM | TEMPERATURE: 98 F | WEIGHT: 156 LBS | HEIGHT: 65 IN | BODY MASS INDEX: 25.99 KG/M2 | DIASTOLIC BLOOD PRESSURE: 73 MMHG

## 2018-06-16 DIAGNOSIS — M79.672 LEFT FOOT PAIN: Primary | ICD-10-CM

## 2018-06-16 DIAGNOSIS — M79.5 FOREIGN BODY (FB) IN SOFT TISSUE: ICD-10-CM

## 2018-06-16 LAB
B-HCG UR QL: NEGATIVE
CTP QC/QA: YES

## 2018-06-16 PROCEDURE — 81025 URINE PREGNANCY TEST: CPT | Performed by: EMERGENCY MEDICINE

## 2018-06-16 PROCEDURE — 99282 EMERGENCY DEPT VISIT SF MDM: CPT | Mod: 25

## 2018-06-16 RX ORDER — QUETIAPINE FUMARATE 300 MG/1
300 TABLET, FILM COATED ORAL NIGHTLY
COMMUNITY
End: 2018-12-13

## 2018-06-16 NOTE — ED PROVIDER NOTES
Encounter Date: 6/16/2018       History     Chief Complaint   Patient presents with    Foot Pain     pt presents to ED with c/o pain to left heel after stepping on glass 2 days ago, seen by PCP but still feels like something is in her foot. Tetanus received 2 days ago with antibiotics     Chief complaint:  Foot pain  27-year-old complains of pain to the bottom of her left foot when she walks.  Patient does not have pain when she touches it.  The pain only occurs when she wears flat shoes.  When she wears heels, the pain is not present.  She thinks that she may have a very small piece of glass in her foot.  She did step on glass 2 days ago and felt that something was stuck in it.  However the area bled for just a few seconds.  Patient saw her primary care physician who could not identify glass.  She was placed on antibiotics and given a tetanus shot.  Patient rates her pain as 5/10 when she wears flat she has an walks.  She denies redness or drainage      The history is provided by the patient.     Review of patient's allergies indicates:  No Known Allergies  Past Medical History:   Diagnosis Date    Interstitial cystitis     Kidney stone     Migraine     UTI (urinary tract infection)      Past Surgical History:   Procedure Laterality Date    ABDOMINAL SURGERY      [place] EXPLORATORY OF ABDOMEN       Family History   Problem Relation Age of Onset    Cancer Mother     Diabetes Father      Social History   Substance Use Topics    Smoking status: Current Every Day Smoker     Packs/day: 0.50    Smokeless tobacco: Never Used    Alcohol use 0.0 oz/week      Comment: occassionally     Review of Systems   Musculoskeletal: Positive for gait problem.   Skin: Negative for color change and wound.       Physical Exam     Initial Vitals [06/16/18 0830]   BP Pulse Resp Temp SpO2   111/73 84 14 97.8 °F (36.6 °C) 99 %      MAP       --         Physical Exam    Nursing note and vitals reviewed.  Constitutional: No distress.    Cardiovascular: Intact distal pulses.   Pulmonary/Chest: No respiratory distress.   Musculoskeletal: Normal range of motion.        Left foot: Normal. There is normal range of motion, no tenderness, no swelling, normal capillary refill and no laceration.   Neurological: She is alert and oriented to person, place, and time. She has normal strength. No sensory deficit.   Skin: Capillary refill takes less than 2 seconds. No erythema.   Psychiatric: She has a normal mood and affect.         ED Course   Procedures  Labs Reviewed   POCT URINE PREGNANCY          X-Ray Foot Complete Left   Final Result      Normal exam         Electronically signed by: Ainsley Dawson MD   Date:    06/16/2018   Time:    08:53           Medical Decision Making:   Initial Assessment:   27-year-old complains of possible foreign body to the plantar surface of her left foot.  On exam patient has no tenderness to deep palpation.  There is no evidence of infection.  ED Management:  X-ray shows no foreign body.  However patient likely does have a tiny piece of glass stuck in the foot.  I explained to the patient that attempting to remove this glass would not be beneficial in the emergency department.  She has no open wounds and no evidence of infection.  There is no tenderness to palpation.  Patient will be referred to Podiatry.  She does have a heel pad said she has worn for heel spurs in the past.  She was advised to use these and to take ibuprofen.                      Clinical Impression:   The primary encounter diagnosis was Left foot pain. A diagnosis of Foreign body (FB) in soft tissue was also pertinent to this visit.                             Isabel Mooney MD  06/16/18 4508

## 2018-06-16 NOTE — ED NOTES
Neuro: AAOx3  Resp: Airway patent, respirations even/unlabored  Cardiac: Skin pink/warm/dry, pulses intact  Abdomen: Soft, non-tender to palpation, denies N/V/D  Musculoskeletal: Moves all extremities equally, ROM intact  Left foot pain

## 2018-06-16 NOTE — DISCHARGE INSTRUCTIONS
Use heel pads.  Continue to use heat as needed.  Follow up with Podiatry.  Take ibuprofen every 6 hr with food as needed for pain.

## 2018-06-20 DIAGNOSIS — R10.32 LEFT LOWER QUADRANT PAIN: ICD-10-CM

## 2018-06-20 DIAGNOSIS — R10.2 FEMALE PELVIC PAIN: ICD-10-CM

## 2018-06-20 DIAGNOSIS — N83.202 CYST OF LEFT OVARY: ICD-10-CM

## 2018-06-20 RX ORDER — NORETHINDRONE AND ETHINYL ESTRADIOL 0.4-0.035
KIT ORAL
Qty: 28 TABLET | Refills: 6 | Status: SHIPPED | OUTPATIENT
Start: 2018-06-20 | End: 2019-02-27

## 2018-07-19 ENCOUNTER — TELEPHONE (OUTPATIENT)
Dept: OBSTETRICS AND GYNECOLOGY | Facility: CLINIC | Age: 28
End: 2018-07-19

## 2018-07-19 DIAGNOSIS — G43.909 MIGRAINE WITHOUT STATUS MIGRAINOSUS, NOT INTRACTABLE, UNSPECIFIED MIGRAINE TYPE: Primary | ICD-10-CM

## 2018-07-19 RX ORDER — BUTALBITAL, ACETAMINOPHEN AND CAFFEINE 50; 325; 40 MG/1; MG/1; MG/1
1 TABLET ORAL EVERY 4 HOURS PRN
Qty: 20 TABLET | Refills: 0 | Status: SHIPPED | OUTPATIENT
Start: 2018-07-19 | End: 2018-08-18

## 2018-07-19 NOTE — TELEPHONE ENCOUNTER
----- Message from Cass Smiley sent at 7/19/2018  9:27 AM CDT -----  Contact: Self/[phone]  Refill:  butalbital-acetaminophen-caffeine -40 mg (FIORICET, ESGIC) -40 mg per tablet    .  [facility] [address] [place], [place] [address] [address] [place]  [place] [address]  Phone: [phone] Fax: [phone]    Thank you.

## 2018-09-08 ENCOUNTER — HOSPITAL ENCOUNTER (EMERGENCY)
Facility: HOSPITAL | Age: 28
Discharge: HOME | End: 2018-09-08
Attending: EMERGENCY MEDICINE
Payer: MEDICAID

## 2018-09-08 VITALS
RESPIRATION RATE: 16 BRPM | BODY MASS INDEX: 26.66 KG/M2 | OXYGEN SATURATION: 100 % | HEART RATE: 74 BPM | TEMPERATURE: 98 F | DIASTOLIC BLOOD PRESSURE: 76 MMHG | HEIGHT: 65 IN | WEIGHT: 160 LBS | SYSTOLIC BLOOD PRESSURE: 131 MMHG

## 2018-09-08 DIAGNOSIS — R30.0 DYSURIA: ICD-10-CM

## 2018-09-08 DIAGNOSIS — N30.90 CYSTITIS: ICD-10-CM

## 2018-09-08 DIAGNOSIS — K02.9 PAIN DUE TO DENTAL CARIES: Primary | ICD-10-CM

## 2018-09-08 LAB
B-HCG UR QL: NEGATIVE
BILIRUBIN, POC UA: NEGATIVE
BLOOD, POC UA: NEGATIVE
CLARITY, POC UA: CLEAR
COLOR, POC UA: YELLOW
CTP QC/QA: YES
GLUCOSE, POC UA: NEGATIVE
KETONES, POC UA: NEGATIVE
LEUKOCYTE EST, POC UA: ABNORMAL
NITRITE, POC UA: NEGATIVE
PH UR STRIP: 8 [PH]
PROTEIN, POC UA: ABNORMAL
SPECIFIC GRAVITY, POC UA: 1.01
UROBILINOGEN, POC UA: 0.2 E.U./DL

## 2018-09-08 PROCEDURE — 99284 EMERGENCY DEPT VISIT MOD MDM: CPT | Mod: 25

## 2018-09-08 PROCEDURE — 81025 URINE PREGNANCY TEST: CPT | Performed by: EMERGENCY MEDICINE

## 2018-09-08 PROCEDURE — 87186 SC STD MICRODIL/AGAR DIL: CPT

## 2018-09-08 PROCEDURE — 87077 CULTURE AEROBIC IDENTIFY: CPT

## 2018-09-08 PROCEDURE — 81003 URINALYSIS AUTO W/O SCOPE: CPT

## 2018-09-08 PROCEDURE — 96372 THER/PROPH/DIAG INJ SC/IM: CPT

## 2018-09-08 PROCEDURE — 63600175 PHARM REV CODE 636 W HCPCS: Performed by: EMERGENCY MEDICINE

## 2018-09-08 PROCEDURE — 87086 URINE CULTURE/COLONY COUNT: CPT

## 2018-09-08 PROCEDURE — 87088 URINE BACTERIA CULTURE: CPT

## 2018-09-08 PROCEDURE — 25000003 PHARM REV CODE 250: Performed by: EMERGENCY MEDICINE

## 2018-09-08 RX ORDER — CEFTRIAXONE 1 G/1
1 INJECTION, POWDER, FOR SOLUTION INTRAMUSCULAR; INTRAVENOUS
Status: COMPLETED | OUTPATIENT
Start: 2018-09-08 | End: 2018-09-08

## 2018-09-08 RX ORDER — KETOROLAC TROMETHAMINE 30 MG/ML
60 INJECTION, SOLUTION INTRAMUSCULAR; INTRAVENOUS
Status: COMPLETED | OUTPATIENT
Start: 2018-09-08 | End: 2018-09-08

## 2018-09-08 RX ORDER — AMOXICILLIN AND CLAVULANATE POTASSIUM 875; 125 MG/1; MG/1
1 TABLET, FILM COATED ORAL 2 TIMES DAILY
Qty: 20 TABLET | Refills: 0 | Status: ON HOLD | OUTPATIENT
Start: 2018-09-08 | End: 2018-09-11 | Stop reason: SDUPTHER

## 2018-09-08 RX ORDER — LIDOCAINE HYDROCHLORIDE 10 MG/ML
10 INJECTION INFILTRATION; PERINEURAL
Status: DISCONTINUED | OUTPATIENT
Start: 2018-09-08 | End: 2018-09-08

## 2018-09-08 RX ORDER — PHENAZOPYRIDINE HYDROCHLORIDE 200 MG/1
200 TABLET, FILM COATED ORAL 3 TIMES DAILY
Qty: 6 TABLET | Refills: 0 | Status: SHIPPED | OUTPATIENT
Start: 2018-09-08 | End: 2018-09-18

## 2018-09-08 RX ORDER — METOCLOPRAMIDE HYDROCHLORIDE 5 MG/ML
10 INJECTION INTRAMUSCULAR; INTRAVENOUS
Status: COMPLETED | OUTPATIENT
Start: 2018-09-08 | End: 2018-09-08

## 2018-09-08 RX ORDER — IBUPROFEN 800 MG/1
800 TABLET ORAL EVERY 6 HOURS PRN
Qty: 20 TABLET | Refills: 0 | Status: SHIPPED | OUTPATIENT
Start: 2018-09-08 | End: 2018-12-13

## 2018-09-08 RX ORDER — LIDOCAINE HYDROCHLORIDE 20 MG/ML
5 INJECTION, SOLUTION EPIDURAL; INFILTRATION; INTRACAUDAL; PERINEURAL
Status: COMPLETED | OUTPATIENT
Start: 2018-09-08 | End: 2018-09-08

## 2018-09-08 RX ORDER — CHLORHEXIDINE GLUCONATE ORAL RINSE 1.2 MG/ML
15 SOLUTION DENTAL 2 TIMES DAILY
Qty: 118 ML | Refills: 0 | Status: SHIPPED | OUTPATIENT
Start: 2018-09-08 | End: 2018-09-22

## 2018-09-08 RX ORDER — PROMETHAZINE HYDROCHLORIDE 25 MG/1
25 SUPPOSITORY RECTAL EVERY 6 HOURS PRN
Qty: 10 SUPPOSITORY | Refills: 0 | Status: SHIPPED | OUTPATIENT
Start: 2018-09-08 | End: 2018-12-13

## 2018-09-08 RX ORDER — ONDANSETRON 4 MG/1
4 TABLET, ORALLY DISINTEGRATING ORAL
Status: DISCONTINUED | OUTPATIENT
Start: 2018-09-08 | End: 2018-09-08

## 2018-09-08 RX ADMIN — METOCLOPRAMIDE 10 MG: 5 INJECTION, SOLUTION INTRAMUSCULAR; INTRAVENOUS at 10:09

## 2018-09-08 RX ADMIN — CEFTRIAXONE SODIUM 1 G: 1 INJECTION, POWDER, FOR SOLUTION INTRAMUSCULAR; INTRAVENOUS at 10:09

## 2018-09-08 RX ADMIN — LIDOCAINE HYDROCHLORIDE 2.1 MG: 20 INJECTION, SOLUTION EPIDURAL; INFILTRATION; INTRACAUDAL; PERINEURAL at 10:09

## 2018-09-08 RX ADMIN — KETOROLAC TROMETHAMINE 60 MG: 30 INJECTION, SOLUTION INTRAMUSCULAR at 10:09

## 2018-09-09 NOTE — ED NOTES
After the medication was opened, pt presented me with her own Zofran from her purse and stated she has been taking Zofran all day and it does not work    Pt is alert and oriented, seated on bed, reports pain to face that started this morning and has gotten worse, states she was seen by her dentist earlier for multiple cracked teeth and he thinks it may be the teeth causing her pain but he cannot see her until Monday, she also reports burning with urination that started today

## 2018-09-10 ENCOUNTER — HOSPITAL ENCOUNTER (OUTPATIENT)
Facility: HOSPITAL | Age: 28
Discharge: HOME | End: 2018-09-11
Attending: EMERGENCY MEDICINE | Admitting: INTERNAL MEDICINE
Payer: MEDICAID

## 2018-09-10 DIAGNOSIS — Z72.0 TOBACCO ABUSE: Chronic | ICD-10-CM

## 2018-09-10 DIAGNOSIS — E87.6 HYPOKALEMIA: ICD-10-CM

## 2018-09-10 DIAGNOSIS — N17.1 ACUTE RENAL FAILURE WITH ACUTE CORTICAL NECROSIS: ICD-10-CM

## 2018-09-10 DIAGNOSIS — N30.00 ACUTE CYSTITIS WITHOUT HEMATURIA: Primary | ICD-10-CM

## 2018-09-10 DIAGNOSIS — N17.9 ACUTE KIDNEY INJURY: ICD-10-CM

## 2018-09-10 DIAGNOSIS — N12 PYELONEPHRITIS: ICD-10-CM

## 2018-09-10 LAB
ALBUMIN SERPL-MCNC: 2.8 G/DL (ref 3.3–5.5)
ALBUMIN SERPL-MCNC: 3 G/DL (ref 3.3–5.5)
ALBUMIN SERPL-MCNC: 3.2 G/DL (ref 3.3–5.5)
ALP SERPL-CCNC: 27 U/L (ref 42–141)
ALP SERPL-CCNC: 30 U/L (ref 42–141)
ALP SERPL-CCNC: 33 U/L (ref 42–141)
B-HCG UR QL: NEGATIVE
BACTERIA UR CULT: NORMAL
BILIRUB SERPL-MCNC: 0.4 MG/DL (ref 0.2–1.6)
BILIRUB SERPL-MCNC: 0.4 MG/DL (ref 0.2–1.6)
BILIRUB SERPL-MCNC: 0.5 MG/DL (ref 0.2–1.6)
BILIRUBIN, POC UA: NEGATIVE
BLOOD, POC UA: ABNORMAL
BUN SERPL-MCNC: 10 MG/DL (ref 7–22)
BUN SERPL-MCNC: 12 MG/DL (ref 7–22)
CALCIUM SERPL-MCNC: 7.6 MG/DL (ref 8–10.3)
CALCIUM SERPL-MCNC: 8.5 MG/DL (ref 8–10.3)
CHLORIDE SERPL-SCNC: 103 MMOL/L (ref 98–108)
CHLORIDE SERPL-SCNC: 107 MMOL/L (ref 98–108)
CLARITY, POC UA: CLEAR
COLOR, POC UA: ABNORMAL
CREAT SERPL-MCNC: 1.8 MG/DL (ref 0.6–1.2)
CREAT SERPL-MCNC: 2.2 MG/DL (ref 0.6–1.2)
CTP QC/QA: YES
GLUCOSE SERPL-MCNC: 116 MG/DL (ref 73–118)
GLUCOSE SERPL-MCNC: 99 MG/DL (ref 73–118)
GLUCOSE, POC UA: ABNORMAL
KETONES, POC UA: NEGATIVE
LEUKOCYTE EST, POC UA: NEGATIVE
NITRITE, POC UA: POSITIVE
PH UR STRIP: 6 [PH]
POC ALT (SGPT): 17 U/L (ref 10–47)
POC ALT (SGPT): 17 U/L (ref 10–47)
POC ALT (SGPT): 18 U/L (ref 10–47)
POC AMYLASE: 41 U/L (ref 14–97)
POC AST (SGOT): 21 U/L (ref 11–38)
POC AST (SGOT): 23 U/L (ref 11–38)
POC AST (SGOT): 24 U/L (ref 11–38)
POC GGT: 17 U/L (ref 5–65)
POC TCO2: 26 MMOL/L (ref 18–33)
POC TCO2: 28 MMOL/L (ref 18–33)
POTASSIUM BLD-SCNC: 3.4 MMOL/L (ref 3.6–5.1)
POTASSIUM BLD-SCNC: 3.4 MMOL/L (ref 3.6–5.1)
PROTEIN, POC UA: ABNORMAL
PROTEIN, POC: 5.8 G/DL (ref 6.4–8.1)
PROTEIN, POC: 6.2 G/DL (ref 6.4–8.1)
PROTEIN, POC: 6.2 G/DL (ref 6.4–8.1)
SODIUM BLD-SCNC: 140 MMOL/L (ref 128–145)
SODIUM BLD-SCNC: 142 MMOL/L (ref 128–145)
SPECIFIC GRAVITY, POC UA: 1.01
UROBILINOGEN, POC UA: 0.2 E.U./DL

## 2018-09-10 PROCEDURE — 99284 EMERGENCY DEPT VISIT MOD MDM: CPT | Mod: 25

## 2018-09-10 PROCEDURE — 80053 COMPREHEN METABOLIC PANEL: CPT

## 2018-09-10 PROCEDURE — 63600175 PHARM REV CODE 636 W HCPCS: Performed by: EMERGENCY MEDICINE

## 2018-09-10 PROCEDURE — 96365 THER/PROPH/DIAG IV INF INIT: CPT

## 2018-09-10 PROCEDURE — 96361 HYDRATE IV INFUSION ADD-ON: CPT

## 2018-09-10 PROCEDURE — 81025 URINE PREGNANCY TEST: CPT | Performed by: EMERGENCY MEDICINE

## 2018-09-10 PROCEDURE — 96375 TX/PRO/DX INJ NEW DRUG ADDON: CPT

## 2018-09-10 PROCEDURE — 85025 COMPLETE CBC W/AUTO DIFF WBC: CPT

## 2018-09-10 PROCEDURE — G0378 HOSPITAL OBSERVATION PER HR: HCPCS

## 2018-09-10 PROCEDURE — 25000003 PHARM REV CODE 250: Performed by: EMERGENCY MEDICINE

## 2018-09-10 PROCEDURE — 96376 TX/PRO/DX INJ SAME DRUG ADON: CPT

## 2018-09-10 PROCEDURE — 81003 URINALYSIS AUTO W/O SCOPE: CPT

## 2018-09-10 PROCEDURE — S4991 NICOTINE PATCH NONLEGEND: HCPCS | Performed by: HOSPITALIST

## 2018-09-10 PROCEDURE — 25000003 PHARM REV CODE 250: Performed by: INTERNAL MEDICINE

## 2018-09-10 PROCEDURE — 25000003 PHARM REV CODE 250: Performed by: HOSPITALIST

## 2018-09-10 PROCEDURE — 82150 ASSAY OF AMYLASE: CPT

## 2018-09-10 RX ORDER — ONDANSETRON 2 MG/ML
4 INJECTION INTRAMUSCULAR; INTRAVENOUS
Status: COMPLETED | OUTPATIENT
Start: 2018-09-10 | End: 2018-09-10

## 2018-09-10 RX ORDER — OXYCODONE AND ACETAMINOPHEN 5; 325 MG/1; MG/1
2 TABLET ORAL
Status: COMPLETED | OUTPATIENT
Start: 2018-09-10 | End: 2018-09-10

## 2018-09-10 RX ORDER — ONDANSETRON 2 MG/ML
4 INJECTION INTRAMUSCULAR; INTRAVENOUS EVERY 8 HOURS PRN
Status: DISCONTINUED | OUTPATIENT
Start: 2018-09-10 | End: 2018-09-11

## 2018-09-10 RX ORDER — MORPHINE SULFATE 4 MG/ML
4 INJECTION, SOLUTION INTRAMUSCULAR; INTRAVENOUS EVERY 4 HOURS PRN
Status: DISCONTINUED | OUTPATIENT
Start: 2018-09-10 | End: 2018-09-11

## 2018-09-10 RX ORDER — IBUPROFEN 200 MG
1 TABLET ORAL DAILY
Status: DISCONTINUED | OUTPATIENT
Start: 2018-09-11 | End: 2018-09-10

## 2018-09-10 RX ORDER — MORPHINE SULFATE 8 MG/ML
4 INJECTION INTRAMUSCULAR; INTRAVENOUS; SUBCUTANEOUS
Status: COMPLETED | OUTPATIENT
Start: 2018-09-10 | End: 2018-09-10

## 2018-09-10 RX ORDER — IBUPROFEN 200 MG
1 TABLET ORAL DAILY
Status: DISCONTINUED | OUTPATIENT
Start: 2018-09-10 | End: 2018-09-11 | Stop reason: HOSPADM

## 2018-09-10 RX ORDER — QUETIAPINE FUMARATE 100 MG/1
300 TABLET, FILM COATED ORAL NIGHTLY
Status: DISCONTINUED | OUTPATIENT
Start: 2018-09-10 | End: 2018-09-11

## 2018-09-10 RX ORDER — HYDROCODONE BITARTRATE AND ACETAMINOPHEN 5; 325 MG/1; MG/1
1 TABLET ORAL EVERY 4 HOURS PRN
Status: DISCONTINUED | OUTPATIENT
Start: 2018-09-10 | End: 2018-09-11

## 2018-09-10 RX ORDER — KETOROLAC TROMETHAMINE 30 MG/ML
30 INJECTION, SOLUTION INTRAMUSCULAR; INTRAVENOUS
Status: COMPLETED | OUTPATIENT
Start: 2018-09-10 | End: 2018-09-10

## 2018-09-10 RX ORDER — SODIUM CHLORIDE, SODIUM LACTATE, POTASSIUM CHLORIDE, CALCIUM CHLORIDE 600; 310; 30; 20 MG/100ML; MG/100ML; MG/100ML; MG/100ML
INJECTION, SOLUTION INTRAVENOUS CONTINUOUS
Status: DISCONTINUED | OUTPATIENT
Start: 2018-09-10 | End: 2018-09-11

## 2018-09-10 RX ORDER — CEFTRIAXONE 1 G/50ML
1 INJECTION, SOLUTION INTRAVENOUS
Status: COMPLETED | OUTPATIENT
Start: 2018-09-10 | End: 2018-09-10

## 2018-09-10 RX ADMIN — SODIUM CHLORIDE 1000 ML: 0.9 INJECTION, SOLUTION INTRAVENOUS at 12:09

## 2018-09-10 RX ADMIN — ONDANSETRON HYDROCHLORIDE 4 MG: 2 INJECTION, SOLUTION INTRAMUSCULAR; INTRAVENOUS at 06:09

## 2018-09-10 RX ADMIN — MORPHINE SULFATE 4 MG: 8 INJECTION, SOLUTION INTRAMUSCULAR; INTRAVENOUS at 06:09

## 2018-09-10 RX ADMIN — QUETIAPINE FUMARATE 300 MG: 100 TABLET ORAL at 10:09

## 2018-09-10 RX ADMIN — SODIUM CHLORIDE 1000 ML: 0.9 INJECTION, SOLUTION INTRAVENOUS at 01:09

## 2018-09-10 RX ADMIN — NICOTINE 1 PATCH: 21 PATCH, EXTENDED RELEASE TRANSDERMAL at 10:09

## 2018-09-10 RX ADMIN — SODIUM CHLORIDE 1000 ML: 0.9 INJECTION, SOLUTION INTRAVENOUS at 06:09

## 2018-09-10 RX ADMIN — OXYCODONE HYDROCHLORIDE AND ACETAMINOPHEN 2 TABLET: 5; 325 TABLET ORAL at 01:09

## 2018-09-10 RX ADMIN — KETOROLAC TROMETHAMINE 30 MG: 30 INJECTION, SOLUTION INTRAMUSCULAR at 12:09

## 2018-09-10 RX ADMIN — SODIUM CHLORIDE, SODIUM LACTATE, POTASSIUM CHLORIDE, AND CALCIUM CHLORIDE: .6; .31; .03; .02 INJECTION, SOLUTION INTRAVENOUS at 09:09

## 2018-09-10 RX ADMIN — PROMETHAZINE HYDROCHLORIDE 12.5 MG: 25 INJECTION INTRAMUSCULAR; INTRAVENOUS at 02:09

## 2018-09-10 RX ADMIN — CEFTRIAXONE 1 G: 1 INJECTION, SOLUTION INTRAVENOUS at 12:09

## 2018-09-10 RX ADMIN — ONDANSETRON 4 MG: 2 INJECTION INTRAMUSCULAR; INTRAVENOUS at 09:09

## 2018-09-10 RX ADMIN — ONDANSETRON 4 MG: 2 INJECTION INTRAMUSCULAR; INTRAVENOUS at 12:09

## 2018-09-10 RX ADMIN — MORPHINE SULFATE 4 MG: 4 INJECTION, SOLUTION INTRAMUSCULAR; INTRAVENOUS at 09:09

## 2018-09-10 NOTE — ED PROVIDER NOTES
Encounter Date: 9/10/2018       History     Chief Complaint   Patient presents with    Vomiting     pt reports she was seen 2 days ago and was sent home with medication for abdominal pain. pt reports vomiting x 2 days and abdominal pain has gotten worse.     Abdominal Pain     pt reports left sided abdominal pain x 2 days and pain has been unrelieved with prescribed pain medication     Chief complaint:  Nausea and vomiting  27-year-old complains of nausea and vomiting for the last 2 days.  Patient was seen here 2 days ago and prescribed Augmentin for UTI.  Patient says she has been unable to keep down anything.  She took the antibiotics on the 1st day but nothing since then.  She reports chills and generalized weakness. She has pain to her left lower quadrant and her left flank area.  She denies fever.  Patient reports generalized body aches.  She has had a decreased appetite.  She has dysuria.  Patient is also on her menstrual cycle.  Her symptoms worsened with movement.  She was seen here with dental pain 2 days ago and says that that has improved..  Her pain is moderate to severe      The history is provided by the patient.     Review of patient's allergies indicates:  No Known Allergies  Past Medical History:   Diagnosis Date    Interstitial cystitis     Kidney stone     Migraine     UTI (urinary tract infection)      Past Surgical History:   Procedure Laterality Date    ABDOMINAL SURGERY      [place] EXPLORATORY OF ABDOMEN       Family History   Problem Relation Age of Onset    Cancer Mother     Diabetes Father      Social History     Tobacco Use    Smoking status: Current Every Day Smoker     Packs/day: 0.50    Smokeless tobacco: Never Used   Substance Use Topics    Alcohol use: Yes     Alcohol/week: 0.0 oz     Comment: occassionally    Drug use: Yes     Types: Marijuana     Review of Systems   Constitutional: Positive for activity change, appetite change and chills. Negative for fever.   HENT:  Negative for sore throat.    Respiratory: Negative for shortness of breath.    Cardiovascular: Negative for chest pain.   Gastrointestinal: Positive for abdominal pain, nausea and vomiting.   Genitourinary: Positive for dysuria and flank pain.   Musculoskeletal: Positive for back pain and myalgias.   Skin: Negative for rash.   Neurological: Positive for weakness.   Hematological: Does not bruise/bleed easily.       Physical Exam     Initial Vitals [09/10/18 1125]   BP Pulse Resp Temp SpO2   122/89 70 18 99.1 °F (37.3 °C) 99 %      MAP       --         Physical Exam    Nursing note and vitals reviewed.  Constitutional: She appears well-developed and well-nourished.   HENT:   Head: Normocephalic and atraumatic.   Eyes: Conjunctivae and EOM are normal. Pupils are equal, round, and reactive to light.   Neck: Normal range of motion. Neck supple.   Cardiovascular: Normal rate and regular rhythm.   Pulmonary/Chest: Breath sounds normal.   Abdominal: Soft. There is tenderness in the left lower quadrant. There is no rebound and no guarding.       Musculoskeletal: Normal range of motion.   Neurological: She is alert and oriented to person, place, and time. She has normal strength.   Skin: Skin is warm and dry.   Psychiatric: She has a normal mood and affect.         ED Course   Procedures  Labs Reviewed   POCT URINALYSIS W/O SCOPE - Abnormal; Notable for the following components:       Result Value    Glucose, UA Trace (*)     Bilirubin, UA Negative (*)     Ketones, UA Negative (*)     Blood, UA 2+ (*)     Protein, UA Trace (*)     Nitrite, UA Positive (*)     Leukocytes, UA Negative (*)     All other components within normal limits   CBC W/ AUTO DIFFERENTIAL   POCT URINE PREGNANCY   POCT URINALYSIS W/O SCOPE   POCT CMP   POCT AMYLASE          Imaging Results    None          Medical Decision Making:   Initial Assessment:   27-year-old who complains of nausea and vomiting associated with generalized weakness.  Patient was  diagnosed with the UTI 2 days ago.  She has been unable to take the medication secondary to vomiting.  ED Management:  Patient will be given IV fluids.  Urine culture from 2 days ago showed E coli that is pansensitive.  She will be given IV Rocephin.  She will also be treated with Zofran and Toradol.  Patient's labs were significant for a creatinine of 2.2.  She has positive nitrite in the urine.  She has a normal white blood cell count.  Patient was given Toradol without improvement of her pain. She was then given 2 Percocet.  Patient still reports pain.  She was given 2 L of IV fluids.  CMP will be repeated.  Patient was also given Zofran as well as Phenergan.  Patient symptoms have not remarkably improved.  I feel that she should be placed in observation for IV fluids and continued IV antibiotics.                      Clinical Impression:   The primary encounter diagnosis was Pyelonephritis. A diagnosis of Acute kidney injury was also pertinent to this visit.                             Isabel Mooney MD  09/10/18 1483

## 2018-09-11 VITALS
OXYGEN SATURATION: 98 % | DIASTOLIC BLOOD PRESSURE: 79 MMHG | TEMPERATURE: 98 F | HEART RATE: 76 BPM | RESPIRATION RATE: 18 BRPM | SYSTOLIC BLOOD PRESSURE: 136 MMHG | WEIGHT: 168 LBS | BODY MASS INDEX: 27.99 KG/M2 | HEIGHT: 65 IN

## 2018-09-11 PROBLEM — N30.00 ACUTE CYSTITIS: Status: ACTIVE | Noted: 2018-09-10

## 2018-09-11 PROBLEM — Z72.0 TOBACCO ABUSE: Chronic | Status: ACTIVE | Noted: 2018-09-11

## 2018-09-11 PROBLEM — E87.6 HYPOKALEMIA: Status: ACTIVE | Noted: 2018-09-11

## 2018-09-11 PROBLEM — N17.9 ACUTE RENAL FAILURE: Status: ACTIVE | Noted: 2018-09-11

## 2018-09-11 LAB
ALBUMIN SERPL BCP-MCNC: 2.7 G/DL
ALP SERPL-CCNC: 31 U/L
ALT SERPL W/O P-5'-P-CCNC: 12 U/L
ANION GAP SERPL CALC-SCNC: 6 MMOL/L
AST SERPL-CCNC: 13 U/L
BASOPHILS # BLD AUTO: 0.01 K/UL
BASOPHILS NFR BLD: 0.2 %
BILIRUB SERPL-MCNC: 0.1 MG/DL
BUN SERPL-MCNC: 13 MG/DL
CALCIUM SERPL-MCNC: 8.5 MG/DL
CHLORIDE SERPL-SCNC: 110 MMOL/L
CO2 SERPL-SCNC: 26 MMOL/L
CREAT SERPL-MCNC: 2 MG/DL
CREAT UR-MCNC: 49.6 MG/DL
DIFFERENTIAL METHOD: ABNORMAL
EOSINOPHIL # BLD AUTO: 0.1 K/UL
EOSINOPHIL NFR BLD: 2.8 %
ERYTHROCYTE [DISTWIDTH] IN BLOOD BY AUTOMATED COUNT: 12.6 %
EST. GFR  (AFRICAN AMERICAN): 38 ML/MIN/1.73 M^2
EST. GFR  (NON AFRICAN AMERICAN): 33 ML/MIN/1.73 M^2
GLUCOSE SERPL-MCNC: 119 MG/DL
HCT VFR BLD AUTO: 31.2 %
HGB BLD-MCNC: 10.1 G/DL
LYMPHOCYTES # BLD AUTO: 1.8 K/UL
LYMPHOCYTES NFR BLD: 35.1 %
MAGNESIUM SERPL-MCNC: 1.8 MG/DL
MCH RBC QN AUTO: 29.9 PG
MCHC RBC AUTO-ENTMCNC: 32.4 G/DL
MCV RBC AUTO: 92 FL
MONOCYTES # BLD AUTO: 0.5 K/UL
MONOCYTES NFR BLD: 9.8 %
NEUTROPHILS # BLD AUTO: 2.6 K/UL
NEUTROPHILS NFR BLD: 52.1 %
PHOSPHATE SERPL-MCNC: 4.6 MG/DL
PLATELET # BLD AUTO: 143 K/UL
PMV BLD AUTO: 10.2 FL
POTASSIUM SERPL-SCNC: 4.6 MMOL/L
PROT SERPL-MCNC: 5.5 G/DL
RBC # BLD AUTO: 3.38 M/UL
SODIUM SERPL-SCNC: 142 MMOL/L
SODIUM UR-SCNC: 34 MMOL/L
WBC # BLD AUTO: 5.02 K/UL

## 2018-09-11 PROCEDURE — 84100 ASSAY OF PHOSPHORUS: CPT

## 2018-09-11 PROCEDURE — G0378 HOSPITAL OBSERVATION PER HR: HCPCS

## 2018-09-11 PROCEDURE — 80053 COMPREHEN METABOLIC PANEL: CPT

## 2018-09-11 PROCEDURE — 85025 COMPLETE CBC W/AUTO DIFF WBC: CPT

## 2018-09-11 PROCEDURE — 36415 COLL VENOUS BLD VENIPUNCTURE: CPT

## 2018-09-11 PROCEDURE — 84300 ASSAY OF URINE SODIUM: CPT

## 2018-09-11 PROCEDURE — 83735 ASSAY OF MAGNESIUM: CPT

## 2018-09-11 PROCEDURE — S4991 NICOTINE PATCH NONLEGEND: HCPCS | Performed by: HOSPITALIST

## 2018-09-11 PROCEDURE — 63600175 PHARM REV CODE 636 W HCPCS: Performed by: INTERNAL MEDICINE

## 2018-09-11 PROCEDURE — 25000003 PHARM REV CODE 250: Performed by: EMERGENCY MEDICINE

## 2018-09-11 PROCEDURE — 25000003 PHARM REV CODE 250: Performed by: NURSE PRACTITIONER

## 2018-09-11 PROCEDURE — 82570 ASSAY OF URINE CREATININE: CPT

## 2018-09-11 PROCEDURE — 25000003 PHARM REV CODE 250: Performed by: INTERNAL MEDICINE

## 2018-09-11 PROCEDURE — 25000003 PHARM REV CODE 250: Performed by: HOSPITALIST

## 2018-09-11 RX ORDER — CEPHALEXIN 500 MG/1
500 CAPSULE ORAL EVERY 12 HOURS
Status: DISCONTINUED | OUTPATIENT
Start: 2018-09-11 | End: 2018-09-11 | Stop reason: HOSPADM

## 2018-09-11 RX ORDER — ACETAMINOPHEN 500 MG
500 TABLET ORAL EVERY 6 HOURS PRN
Status: DISCONTINUED | OUTPATIENT
Start: 2018-09-11 | End: 2018-09-11 | Stop reason: HOSPADM

## 2018-09-11 RX ORDER — GABAPENTIN 300 MG/1
600 CAPSULE ORAL 3 TIMES DAILY
Status: DISCONTINUED | OUTPATIENT
Start: 2018-09-11 | End: 2018-09-11 | Stop reason: HOSPADM

## 2018-09-11 RX ORDER — POTASSIUM CHLORIDE 20 MEQ/1
40 TABLET, EXTENDED RELEASE ORAL ONCE
Status: COMPLETED | OUTPATIENT
Start: 2018-09-11 | End: 2018-09-11

## 2018-09-11 RX ORDER — ONDANSETRON 2 MG/ML
8 INJECTION INTRAMUSCULAR; INTRAVENOUS EVERY 8 HOURS PRN
Status: DISCONTINUED | OUTPATIENT
Start: 2018-09-11 | End: 2018-09-11 | Stop reason: HOSPADM

## 2018-09-11 RX ORDER — QUETIAPINE FUMARATE 100 MG/1
300 TABLET, FILM COATED ORAL NIGHTLY
Status: DISCONTINUED | OUTPATIENT
Start: 2018-09-11 | End: 2018-09-11 | Stop reason: HOSPADM

## 2018-09-11 RX ORDER — SODIUM CHLORIDE, SODIUM LACTATE, POTASSIUM CHLORIDE, CALCIUM CHLORIDE 600; 310; 30; 20 MG/100ML; MG/100ML; MG/100ML; MG/100ML
INJECTION, SOLUTION INTRAVENOUS CONTINUOUS
Status: DISCONTINUED | OUTPATIENT
Start: 2018-09-11 | End: 2018-09-11 | Stop reason: HOSPADM

## 2018-09-11 RX ORDER — SERTRALINE HYDROCHLORIDE 50 MG/1
100 TABLET, FILM COATED ORAL DAILY
Status: DISCONTINUED | OUTPATIENT
Start: 2018-09-11 | End: 2018-09-11 | Stop reason: HOSPADM

## 2018-09-11 RX ORDER — AMOXICILLIN AND CLAVULANATE POTASSIUM 875; 125 MG/1; MG/1
1 TABLET, FILM COATED ORAL EVERY 12 HOURS
Qty: 14 TABLET | Refills: 0 | Status: SHIPPED | OUTPATIENT
Start: 2018-09-11 | End: 2018-09-11 | Stop reason: HOSPADM

## 2018-09-11 RX ORDER — QUETIAPINE FUMARATE 100 MG/1
300 TABLET, FILM COATED ORAL NIGHTLY
Status: DISCONTINUED | OUTPATIENT
Start: 2018-09-11 | End: 2018-09-11 | Stop reason: DRUGHIGH

## 2018-09-11 RX ORDER — RAMELTEON 8 MG/1
8 TABLET ORAL NIGHTLY PRN
Status: DISCONTINUED | OUTPATIENT
Start: 2018-09-11 | End: 2018-09-11 | Stop reason: HOSPADM

## 2018-09-11 RX ORDER — AMOXICILLIN AND CLAVULANATE POTASSIUM 875; 125 MG/1; MG/1
1 TABLET, FILM COATED ORAL 2 TIMES DAILY
Qty: 20 TABLET | Refills: 0 | Status: SHIPPED | OUTPATIENT
Start: 2018-09-11 | End: 2018-09-11 | Stop reason: HOSPADM

## 2018-09-11 RX ADMIN — CEPHALEXIN 500 MG: 500 CAPSULE ORAL at 09:09

## 2018-09-11 RX ADMIN — HYDROCODONE BITARTRATE AND ACETAMINOPHEN 1 TABLET: 5; 325 TABLET ORAL at 02:09

## 2018-09-11 RX ADMIN — SODIUM CHLORIDE, SODIUM LACTATE, POTASSIUM CHLORIDE, AND CALCIUM CHLORIDE: .6; .31; .03; .02 INJECTION, SOLUTION INTRAVENOUS at 05:09

## 2018-09-11 RX ADMIN — NICOTINE 1 PATCH: 21 PATCH, EXTENDED RELEASE TRANSDERMAL at 08:09

## 2018-09-11 RX ADMIN — ONDANSETRON 8 MG: 2 INJECTION INTRAMUSCULAR; INTRAVENOUS at 05:09

## 2018-09-11 RX ADMIN — POTASSIUM CHLORIDE 40 MEQ: 1500 TABLET, EXTENDED RELEASE ORAL at 04:09

## 2018-09-11 NOTE — NURSING
Patient arrived to the unit via EMS stretcher accompanied by EMS personnel with normal saline infusing to a 20g LAC PIV. Vital signs WNL and found in flow sheets with complete patient assessment. Skin dry and intact. Complaints of left abdominal and flank pain noted and to be treated but no signs of respiratory distress noted. Call light in reach and patient instructed to inform the nurse if anything is needed. Patient stable and will continue to be monitored.

## 2018-09-11 NOTE — PROGRESS NOTES
[facility]    WRITTEN HEALTHCARE AND DISCHARGE INFORMATION    Follow-up Information     Jayme Romano NP On 9/18/2018.    Specialty:  Internal Medicine  Why:  @10:15am, for Hospital Follow up  Contact information:  [place] [address]  [phone]                 HAPPY BIRTHDAY!!!!    PLEASE BRING TO ALL FOLLOW UP APPOINTMENTS:   A COPY YOUR DISCHARGE INSTRUCTIONS, Any new MEDICINES YOU ARE CURRENTLY TAKING IN THEIR ORIGINAL BOTTLES  And IDENTIFICATION AND INSURANCE CARD     **PLEASE ARRIVE 15 MINUTES AHEAD OF SCHEDULED APPOINTMENT TIME   ++PLEASE CALL 24 HOURS IN ADVANCE IF YOU MUST RESCHEDULE YOUR APPOINTMENT DAY AND/OR TIME                Help at Home           [phone]  After discharge for assistance [facility] On Call Nurse Care Line 24/7  Assistance     Things You are responsible For To Manage Your Care At Home:  1.    Getting your prescriptions filled   2.    Taking your medications as directed, DO NOT MISS ANY DOSES!  3.    Going to your follow-up doctor appointment. This is important because it  allow the doctor to monitor your progress and determine if  any changes need to made to your treatment plan.     Thank you for choosing [facility] for your care.  Please answer any calls you may receive from [facility] we want to continue to support you as you manage your healthcare needs. [facility] is happy to have the opportunity to serve you.      Sincerely,  Your [facility] Team,  DAVID Au, ACM-RN; [facility]  [phone]

## 2018-09-11 NOTE — HPI
"Ms. Crystal Tsai is a 28 y.o. female with tobacco abuse who presented initially to [facility] ED with complaints of nausea & vomiting for two days.  She had a tooth abscess for which she went to the ED and "got a shot and some antibiotics" two days ago.  She was suspected of having a urinary tract infection at that time as she was having concomitant dysuria and left flank pain.  When she got home, however, she started to have multiple bouts of nausea and vomiting which she estimated to be 20 episodes each day.  She had some subjective fevers, chills, and diaphoresis.  In additional the flank pain she had some increased frequency and urgency.  She is currently on her menstrual cycle and cannot say definitively whether she has hematuria.  She returned to the ED because of the persistent nausea and vomiting and says that the left flank pain had progressed to left upper abdominal quadrant pain with a strange sensation of "ballooning" in that area whenever she urinates.  She does have frequent UTIs but this is as bad as it's ever felt.    "

## 2018-09-11 NOTE — DISCHARGE SUMMARY
"[facility] Hospital Medicine  Discharge Summary      Patient Name: Crystal Tsai  MRN: [id number]  Admission Date: 9/10/2018  Hospital Length of Stay: 0 days  Discharge Date and Time:  09/11/2018 11:04 AM  Attending Physician: Esther Bravo MD   Discharging Provider: AMADOU Harry  Primary Care Provider: Jayme Romano NP      HPI:   Ms. Crystal Tsai is a 28 y.o. female with tobacco abuse who presented initially to [facility] ED with complaints of nausea & vomiting for two days.  She had a tooth abscess for which she went to the ED and "got a shot and some antibiotics" two days ago.  She was suspected of having a urinary tract infection at that time as she was having concomitant dysuria and left flank pain.  When she got home, however, she started to have multiple bouts of nausea and vomiting which she estimated to be 20 episodes each day.  She had some subjective fevers, chills, and diaphoresis.  In additional the flank pain she had some increased frequency and urgency.  She is currently on her menstrual cycle and cannot say definitively whether she has hematuria.  She returned to the ED because of the persistent nausea and vomiting and says that the left flank pain had progressed to left upper abdominal quadrant pain with a strange sensation of "ballooning" in that area whenever she urinates.  She does have frequent UTIs but this is as bad as it's ever felt.      * No surgery found *      Hospital Course:   Place in observation for presumptive UTI; urine culture revealed E. Coli less than 50,000. Her NV resolved, no white count or fever noted. She continue to complain of abdominal pain. Will obtain CT abdomen. Will defer antibiotic she can follow up with primary in clinic. She was dehydrated at the time of admit her creatine was 2.2, improving. She tells me that she still smokes THC. 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 Counseled on cessation - she tells me that she has had these symptoms intermittently since 15. She is clinically stable and anxious for discharge to go to a . As stated above afebrile without leukocytosis. Vitals grossly stable. Discharge home after CT abdomen.     Consults:     No new Assessment & Plan notes have been filed under this hospital service since the last note was generated.  Service: Hospital Medicine    Final Active Diagnoses:    Diagnosis Date Noted POA    PRINCIPAL PROBLEM:  Acute cystitis [N30.00] 09/10/2018 Yes    Acute renal failure [N17.9] 2018 Yes    Hypokalemia [E87.6] 2018 Yes    Tobacco abuse [Z72.0] 2018 Yes     Chronic      Problems Resolved During this Admission:       Discharged Condition: stable    Disposition: Home or Self Care    Follow Up:  Follow-up Information     Jayme Romano NP On 2018.    Specialty:  Internal Medicine  Why:  @10:15am, for Hospital Follow up  [facility]  [facility] [place] [id number] [phone]                 Patient Instructions:      Diet Cardiac     Activity as tolerated       Significant Diagnostic Studies: Labs:   CMP   Recent Labs   Lab  18   0505   NA  142   K  4.6   CL  110   CO2  26   GLU  119*   BUN  13   CREATININE  2.0*   CALCIUM  8.5*   PROT  5.5*   ALBUMIN  2.7*   BILITOT  0.1   ALKPHOS  31*   AST  13   ALT  12   ANIONGAP  6*   ESTGFRAFRICA  38*   EGFRNONAA  33*   , CBC   Recent Labs   Lab  18   0505   WBC  5.02   HGB  10.1*   HCT  31.2*   PLT  143*   , INR   Lab Results   Component Value Date    INR 1.2 2012   , Lipid Panel No results found for: CHOL, HDL, LDLCALC, TRIG, CHOLHDL and Troponin No results for input(s): TROPONINI in the last 168 hours.    Pending Diagnostic Studies:     Procedure Component Value Units Date/Time    CT Abdomen Pelvis  Without Contrast [[id number]]     Order Status:  Sent Lab Status:  In process           Medications:  Reconciled Home Medications:      Medication List      CONTINUE taking these medications    benzocaine-clove oil 20 % Gel  Place 1 application onto teeth every 6 (six) hours as needed (dental pain).     chlorhexidine 0.12 % solution  Commonly known as:  PERIDEX  Use as directed 15 mLs in the mouth or throat 2 (two) times daily. Swish and spit for 14 days     gabapentin 300 MG capsule  Commonly known as:  NEURONTIN  Take 2 capsules (600 mg total) by mouth 3 (three) times daily.     hydrOXYzine pamoate 25 MG Cap  Commonly known as:  VISTARIL  TK 1 C PO QID PRA     ibuprofen 800 MG tablet  Commonly known as:  ADVIL,MOTRIN  Take 1 tablet (800 mg total) by mouth every 6 (six) hours as needed for Pain.     ondansetron 4 MG Tbdl  Commonly known as:  ZOFRAN-ODT  Take 1 tablet (4 mg total) by mouth every 6 (six) hours as needed.     phenazopyridine 200 MG tablet  Commonly known as:  PYRIDIUM  Take 1 tablet (200 mg total) by mouth 3 (three) times daily. for 10 days     polyethylene glycol 17 gram/dose powder  Commonly known as:  GLYCOLAX     promethazine 25 MG suppository  Commonly known as:  PHENERGAN  Place 1 suppository (25 mg total) rectally every 6 (six) hours as needed for Nausea.     QUEtiapine 300 MG Tab  Commonly known as:  SEROQUEL  Take 300 mg by mouth every evening.     sertraline 100 MG tablet  Commonly known as:  ZOLOFT  TK 1/2 T PO QD FOR 7 DAYS THEN TK 1 T PO QD     VYFEMLA (28) 0.4-35 mg-mcg per tablet  Generic drug:  norethindrone-ethinyl estradiol  TAKE 1 TABLET BY MOUTH EVERY DAY        STOP taking these medications    amoxicillin-clavulanate 875-125mg 875-125 mg per tablet  Commonly known as:  AUGMENTIN     dicyclomine 10 MG capsule  Commonly known as:  BENTYL            Indwelling Lines/Drains at time of discharge:   Lines/Drains/Airways          None          Time spent on the discharge of patient: 45 minutes  Patient was seen and examined on the date of discharge and determined to be  suitable for discharge.         DORITA Escudero, FNP-C  Hospitalist - Department of Hospital Medicine  [address], [place] [address]  Office [phone]; Pager [phone]

## 2018-09-11 NOTE — H&P
"[facility] Hospital Medicine  History & Physical    Patient Name: Crystal Tsai  MRN: [id number]  Admission Date: 9/10/2018  Attending Physician: Sisi Rust MD   Primary Care Provider: Jayme Romano NP         Patient information was obtained from patient.     Subjective:     Principal Problem:Acute cystitis    Chief Complaint: Vomiting for two days.    HPI: Ms. Crystal Tsai is a 28 y.o. female with tobacco abuse who presented initially to [facility] ED with complaints of nausea & vomiting for two days.  She had a tooth abscess for which she went to the ED and "got a shot and some antibiotics" two days ago.  She was suspected of having a urinary tract infection at that time as she was having concomitant dysuria and left flank pain.  When she got home, however, she started to have multiple bouts of nausea and vomiting which she estimated to be 20 episodes each day.  She had some subjective fevers, chills, and diaphoresis.  In additional the flank pain she had some increased frequency and urgency.  She is currently on her menstrual cycle and cannot say definitively whether she has hematuria.  She returned to the ED because of the persistent nausea and vomiting and says that the left flank pain had progressed to left upper abdominal quadrant pain with a strange sensation of "ballooning" in that area whenever she urinates.  She does have frequent UTIs but this is as bad as it's ever felt.      Chart Review:  Patient has not had any recent hospitalizations within the system.    Outpatient Follow-Up  Date of Visit Physician Service   May 2018 Wei Powell MD Pain Medicine    Jan 2018 Aman Johnson MD Gynecology    Sept 2017 Wei Boggs MD General Surgery    Oct 2015 Zhane Chin DPM Podiatry      Past Medical History:   Diagnosis Date    Interstitial cystitis     Kidney stone     Migraine     UTI (urinary tract infection)        Past Surgical History:   Procedure " Laterality Date    ABDOMINAL SURGERY      PA EXPLORATORY OF ABDOMEN         Review of patient's allergies indicates:  No Known Allergies    No current facility-administered medications on file prior to encounter.      Current Outpatient Medications on File Prior to Encounter   Medication Sig    gabapentin (NEURONTIN) 300 MG capsule Take 2 capsules (600 mg total) by mouth 3 (three) times daily.    ibuprofen (ADVIL,MOTRIN) 800 MG tablet Take 1 tablet (800 mg total) by mouth every 6 (six) hours as needed for Pain.    ondansetron (ZOFRAN-ODT) 4 MG TbDL Take 1 tablet (4 mg total) by mouth every 6 (six) hours as needed.    phenazopyridine (PYRIDIUM) 200 MG tablet Take 1 tablet (200 mg total) by mouth 3 (three) times daily. for 10 days    polyethylene glycol (GLYCOLAX) 17 gram/dose powder     promethazine (PHENERGAN) 25 MG suppository Place 1 suppository (25 mg total) rectally every 6 (six) hours as needed for Nausea.    QUEtiapine (SEROQUEL) 300 MG Tab Take 300 mg by mouth every evening.     amoxicillin-clavulanate 875-125mg (AUGMENTIN) 875-125 mg per tablet Take 1 tablet by mouth 2 (two) times daily. for 10 days    benzocaine-clove oil 20 % Gel Place 1 application onto teeth every 6 (six) hours as needed (dental pain).    chlorhexidine (PERIDEX) 0.12 % solution Use as directed 15 mLs in the mouth or throat 2 (two) times daily. Swish and spit for 14 days    dicyclomine (BENTYL) 10 MG capsule Take 10 mg by mouth 4 (four) times daily before meals and nightly.    hydrOXYzine pamoate (VISTARIL) 25 MG Cap TK 1 C PO QID PRA    sertraline (ZOLOFT) 100 MG tablet TK 1/2 T PO QD FOR 7 DAYS THEN TK 1 T PO QD    VYFEMLA, 28, 0.4-35 mg-mcg per tablet TAKE 1 TABLET BY MOUTH EVERY DAY     Family History     Problem Relation (Age of Onset)    Cancer Mother    Diabetes Father        Tobacco Use    Smoking status: Current Every Day Smoker     Packs/day: 0.50    Smokeless tobacco: Never Used   Substance and Sexual Activity     Alcohol use: Yes     Alcohol/week: 0.0 oz     Comment: occassionally    Drug use: Yes     Types: Marijuana    Sexual activity: Yes     Partners: Male     Birth control/protection: None     Review of Systems   Constitutional: Positive for appetite change, chills, fatigue and fever. Negative for activity change, diaphoresis and unexpected weight change.   HENT: Negative.    Eyes: Negative.    Respiratory: Negative for cough, chest tightness, shortness of breath and wheezing.    Cardiovascular: Negative for chest pain, palpitations and leg swelling.   Gastrointestinal: Positive for abdominal pain, nausea and vomiting. Negative for abdominal distention, blood in stool, constipation and diarrhea.   Genitourinary: Positive for dysuria, flank pain, frequency and urgency.   Skin: Negative.    Neurological: Negative for dizziness, seizures, syncope, weakness and light-headedness.   Psychiatric/Behavioral: Negative.      Objective:     Vital Signs (Most Recent):  Temp: 98 °F (36.7 °C) (09/11/18 0430)  Pulse: 73 (09/11/18 0430)  Resp: 18 (09/11/18 0430)  BP: 106/69 (09/11/18 0430)  SpO2: 99 % (09/11/18 0430) Vital Signs (24h Range):  Temp:  [97.7 °F (36.5 °C)-99.1 °F (37.3 °C)] 98 °F (36.7 °C)  Pulse:  [55-73] 73  Resp:  [18] 18  SpO2:  [98 %-100 %] 99 %  BP: ()/(58-89) 106/69     Weight: 76.2 kg (167 lb 15.9 oz)  Body mass index is 27.96 kg/m².    Physical Exam   Constitutional: She appears well-developed and well-nourished. No distress.   HENT:   Head: Normocephalic and atraumatic.   Right Ear: External ear normal.   Left Ear: External ear normal.   Eyes: Right eye exhibits no discharge. Left eye exhibits no discharge.   Neck: Normal range of motion.   Cardiovascular: Normal rate, regular rhythm, normal heart sounds and intact distal pulses. Exam reveals no gallop and no friction rub.   No murmur heard.  Pulmonary/Chest: Effort normal and breath sounds normal. No stridor. No respiratory distress. She has no  wheezes. She has no rales. She exhibits no tenderness.   Abdominal: Soft. Bowel sounds are normal.   There is left CVA tenderness to palpation as well as left upper abdominal quadrant tenderness to palpation; no guarding or rebound; bowel sounds hypoactive; nondistended   Musculoskeletal: Normal range of motion. She exhibits no edema.   Neurological: She is alert.   Skin: Skin is warm and dry. She is not diaphoretic. No erythema.   Psychiatric: She has a normal mood and affect. Her behavior is normal. Judgment and thought content normal.   Nursing note and vitals reviewed.          Significant Labs: All pertinent labs within the past 24 hours have been reviewed.    Significant Imaging: I have reviewed and interpreted all pertinent imaging results/findings within the past 24 hours.    Assessment/Plan:     * Acute cystitis    Patient did have urine cultures obtained two days ago which reveals pan-sensitive Escherichia coli.  She does not meet criteria for sepsis.  She has been started ceftriaxone.          Acute renal failure    Patient's urinalysis is significant for a specific gravity of 1.010, positive for nitrites, 2+ blood.  Urine output has been fair.  Will obtain additional urine studies; provide aggressive IV fluid hydration; monitor the urine output; recheck the renal function in the morning; and avoid nephrotoxins.        Hypokalemia    Will replete orally and recheck her potassium level in the morning.        Tobacco abuse    Patient was counseled on smoking cessation and she will be provided a nicotine transdermal patch applied while inpatient.  Will provide additional smoking cessation counseling prior to discharge.          VTE Risk Mitigation (From admission, onward)        Ordered     IP VTE LOW RISK PATIENT  Once      09/10/18 1958           Jennie Christy M.D.  Staff [facility]  Department of Hospital Medicine  [facility]  Pager: [phone]

## 2018-09-11 NOTE — PLAN OF CARE
Problem: Patient Care Overview  Goal: Plan of Care Review  Outcome: Outcome(s) achieved Date Met: 09/11/18 09/11/18 1052   Coping/Psychosocial   Plan Of Care Reviewed With patient;mother       Problem: Urinary Tract Infection (Adult)  Goal: Signs and Symptoms of Listed Potential Problems Will be Absent, Minimized or Managed (Urinary Tract Infection)  Signs and symptoms of listed potential problems will be absent, minimized or managed by discharge/transition of care (reference Urinary Tract Infection (Adult) CPG).  Outcome: Outcome(s) achieved Date Met: 09/11/18 09/11/18 1052   Urinary Tract Infection   Problems Assessed (Urinary Tract Infection) all   Problems Present (Urinary Tract Infection) none

## 2018-09-11 NOTE — PLAN OF CARE
To patient's room to discuss patient managing her care at home.      TN Role Explained.  Patient identified by using 2 identifiers:  Name and date of birth    Patient stated that her mother WILL HELP AT HOME WITH her  RECOVERY.      TN name and contact info placed on the communication board    Preferred Pharmacy:    [id number] [place] [address] [facility] [address] [place] [address]  Phone: [phone] Fax: [phone]    [facility] - [place] - [address]  [address]  [facility] [address]  Phone: [phone] Fax: [phone]       09/11/18 0932   Discharge Assessment   Assessment Type Discharge Planning Assessment   Confirmed/corrected address and phone number on facesheet? Yes   Assessment information obtained from? Patient   Expected Length of Stay (days) 1   Communicated expected length of stay with patient/caregiver yes   Prior to hospitilization cognitive status: Alert/Oriented   Prior to hospitalization functional status: Independent   Current cognitive status: Alert/Oriented   Current Functional Status: Independent   Lives With parent(s)   Able to Return to Prior Arrangements yes   Is patient able to care for self after discharge? Yes   Patient's perception of discharge disposition home or selfcare   Readmission Within The Last 30 Days no previous admission in last 30 days   Patient currently being followed by outpatient case management? No   Patient currently receives any other outside agency services? No   Equipment Currently Used at Home none   Do you have any problems affording any of your prescribed medications? No   Is the patient taking medications as prescribed? yes   Does the patient have transportation home? Yes   Transportation Available family or friend will provide   Does the patient receive services at the Coumadin Clinic? No   Discharge Plan A Home with family   Patient/Family  In Agreement With Plan yes

## 2018-09-11 NOTE — HOSPITAL COURSE
Place in observation for presumptive UTI; urine culture revealed E. Coli less than 50,000. Her NV resolved, no white count or fever noted. She continue to complain of abdominal pain. Will obtain CT abdomen. Will defer antibiotic she can follow up with primary in clinic. She was dehydrated at the time of admit her creatine was 2.2, improving. She tells me that she still smokes THC. 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 Counseled on cessation - she tells me that she has had these symptoms intermittently since 15. She is clinically stable and anxious for discharge to go to a . As stated above afebrile without leukocytosis. Vitals grossly stable. Discharge home after CT abdomen.

## 2018-09-11 NOTE — PROGRESS NOTES
Discharge instructions given to patient and family at bedside. Patient and family verbalized understanding and states willingness to comply. Saline lock removed.

## 2018-09-11 NOTE — PLAN OF CARE
09/11/18 1121   Final Note   Assessment Type Final Discharge Note   Discharge Disposition Home   What phone number can be called within the next 1-3 days to see how you are doing after discharge? ([phone])   Hospital Follow Up  Appt(s) scheduled? Yes   Discharge plans and expectations educations in teach back method with documentation complete? Yes   Right Care Referral Info   Post Acute Recommendation No Care   EDUCATION:  Things You are responsible For To Manage Your Care At Home given to patient and mother at bedside:  1.    Getting your prescriptions filled   2.    Taking your medications as directed, DO NOT MISS ANY DOSES!  3.    Going to your follow-up doctor appointment. This is important because it  allow the doctor to monitor your progress and determine if  any changes need to made to your treatment plan.    NurseDestiny notified that patient is ok to DC from a CM standpoint.

## 2018-09-11 NOTE — PLAN OF CARE
Problem: Patient Care Overview  Goal: Plan of Care Review   09/11/18 0404   Coping/Psychosocial   Plan Of Care Reviewed With patient       Problem: Pain, Acute (Adult)  Intervention: Monitor/Manage Analgesia   09/11/18 0404   Safety Interventions   Medication Review/Management medications reviewed     Intervention: Mutually Develop/Implement Acute Pain Management Plan   09/11/18 0404   Pain/Comfort/Sleep Interventions   Pain Management Interventions pain management plan reviewed with patient/caregiver   Cognitive Interventions   Sensory Stimulation Regulation care clustered;lighting decreased;music/television provided for relaxation     Intervention: Support/Optimize Psychosocial Response to Acute Pain   09/11/18 0404   Coping/Psychosocial Interventions   Supportive Measures verbalization of feelings encouraged   Psychosocial Support   Family/Support System Care involvement promoted;presence promoted;self-care encouraged       Goal: Identify Related Risk Factors and Signs and Symptoms  Related risk factors and signs and symptoms are identified upon initiation of Human Response Clinical Practice Guideline (CPG)   09/11/18 0404   Pain, Acute   Signs and Symptoms (Acute Pain) verbalization of pain descriptors     Goal: Acceptable Pain Control/Comfort Level  Patient will demonstrate the desired outcomes by discharge/transition of care.   09/11/18 0404   Pain, Acute (Adult)   Acceptable Pain Control/Comfort Level making progress toward outcome       Comments: Patient remained free of injury throughout the shift. Complaints of pain treated with prn analgesics but patient in NAD throughout shift. Vital signs remained stable. Plan to monitor and manage pain, continue IV antibiotics for pyelonephritis treatment, and continue IV fluids. Patient updated on plan of care and verbalized understanding. Call light in reach and patient instructed to inform the nurse if anything is needed. Patient stable and will continue to be  monitored.

## 2018-09-11 NOTE — PROGRESS NOTES
Patient to scheduled testing as ordered. Patient awake, alert, oriented. No apparent distress noted.

## 2018-09-11 NOTE — SUBJECTIVE & OBJECTIVE
Past Medical History:   Diagnosis Date    Interstitial cystitis     Kidney stone     Migraine     UTI (urinary tract infection)        Past Surgical History:   Procedure Laterality Date    ABDOMINAL SURGERY      [place] EXPLORATORY OF ABDOMEN         Review of patient's allergies indicates:  No Known Allergies    No current facility-administered medications on file prior to encounter.      Current Outpatient Medications on File Prior to Encounter   Medication Sig    gabapentin (NEURONTIN) 300 MG capsule Take 2 capsules (600 mg total) by mouth 3 (three) times daily.    ibuprofen (ADVIL,MOTRIN) 800 MG tablet Take 1 tablet (800 mg total) by mouth every 6 (six) hours as needed for Pain.    ondansetron (ZOFRAN-ODT) 4 MG TbDL Take 1 tablet (4 mg total) by mouth every 6 (six) hours as needed.    phenazopyridine (PYRIDIUM) 200 MG tablet Take 1 tablet (200 mg total) by mouth 3 (three) times daily. for 10 days    polyethylene glycol (GLYCOLAX) 17 gram/dose powder     promethazine (PHENERGAN) 25 MG suppository Place 1 suppository (25 mg total) rectally every 6 (six) hours as needed for Nausea.    QUEtiapine (SEROQUEL) 300 MG Tab Take 300 mg by mouth every evening.     amoxicillin-clavulanate 875-125mg (AUGMENTIN) 875-125 mg per tablet Take 1 tablet by mouth 2 (two) times daily. for 10 days    benzocaine-clove oil 20 % Gel Place 1 application onto teeth every 6 (six) hours as needed (dental pain).    chlorhexidine (PERIDEX) 0.12 % solution Use as directed 15 mLs in the mouth or throat 2 (two) times daily. Swish and spit for 14 days    dicyclomine (BENTYL) 10 MG capsule Take 10 mg by mouth 4 (four) times daily before meals and nightly.    hydrOXYzine pamoate (VISTARIL) 25 MG Cap TK 1 C PO QID PRA    sertraline (ZOLOFT) 100 MG tablet TK 1/2 T PO QD FOR 7 DAYS THEN TK 1 T PO QD    VYFEMLA, 28, 0.4-35 mg-mcg per tablet TAKE 1 TABLET BY MOUTH EVERY DAY     Family History     Problem Relation (Age of Onset)    Cancer  Mother    Diabetes Father        Tobacco Use    Smoking status: Current Every Day Smoker     Packs/day: 0.50    Smokeless tobacco: Never Used   Substance and Sexual Activity    Alcohol use: Yes     Alcohol/week: 0.0 oz     Comment: occassionally    Drug use: Yes     Types: Marijuana    Sexual activity: Yes     Partners: Male     Birth control/protection: None     Review of Systems   Constitutional: Positive for appetite change, chills, fatigue and fever. Negative for activity change, diaphoresis and unexpected weight change.   HENT: Negative.    Eyes: Negative.    Respiratory: Negative for cough, chest tightness, shortness of breath and wheezing.    Cardiovascular: Negative for chest pain, palpitations and leg swelling.   Gastrointestinal: Positive for abdominal pain, nausea and vomiting. Negative for abdominal distention, blood in stool, constipation and diarrhea.   Genitourinary: Positive for dysuria, flank pain, frequency and urgency.   Skin: Negative.    Neurological: Negative for dizziness, seizures, syncope, weakness and light-headedness.   Psychiatric/Behavioral: Negative.      Objective:     Vital Signs (Most Recent):  Temp: 98 °F (36.7 °C) (09/11/18 0430)  Pulse: 73 (09/11/18 0430)  Resp: 18 (09/11/18 0430)  BP: 106/69 (09/11/18 0430)  SpO2: 99 % (09/11/18 0430) Vital Signs (24h Range):  Temp:  [97.7 °F (36.5 °C)-99.1 °F (37.3 °C)] 98 °F (36.7 °C)  Pulse:  [55-73] 73  Resp:  [18] 18  SpO2:  [98 %-100 %] 99 %  BP: ()/(58-89) 106/69     Weight: 76.2 kg (167 lb 15.9 oz)  Body mass index is 27.96 kg/m².    Physical Exam   Constitutional: She appears well-developed and well-nourished. No distress.   HENT:   Head: Normocephalic and atraumatic.   Right Ear: External ear normal.   Left Ear: External ear normal.   Eyes: Right eye exhibits no discharge. Left eye exhibits no discharge.   Neck: Normal range of motion.   Cardiovascular: Normal rate, regular rhythm, normal heart sounds and intact distal  pulses. Exam reveals no gallop and no friction rub.   No murmur heard.  Pulmonary/Chest: Effort normal and breath sounds normal. No stridor. No respiratory distress. She has no wheezes. She has no rales. She exhibits no tenderness.   Abdominal: Soft. Bowel sounds are normal.   There is left CVA tenderness to palpation as well as left upper abdominal quadrant tenderness to palpation; no guarding or rebound; bowel sounds hypoactive; nondistended   Musculoskeletal: Normal range of motion. She exhibits no edema.   Neurological: She is alert.   Skin: Skin is warm and dry. She is not diaphoretic. No erythema.   Psychiatric: She has a normal mood and affect. Her behavior is normal. Judgment and thought content normal.   Nursing note and vitals reviewed.          Significant Labs: All pertinent labs within the past 24 hours have been reviewed.    Significant Imaging: I have reviewed and interpreted all pertinent imaging results/findings within the past 24 hours.

## 2018-12-13 ENCOUNTER — HOSPITAL ENCOUNTER (INPATIENT)
Facility: HOSPITAL | Age: 28
LOS: 4 days | Discharge: HOME | DRG: 393 | End: 2018-12-17
Attending: EMERGENCY MEDICINE | Admitting: SURGERY
Payer: MEDICAID

## 2018-12-13 ENCOUNTER — OFFICE VISIT (OUTPATIENT)
Dept: OBSTETRICS AND GYNECOLOGY | Facility: CLINIC | Age: 28
DRG: 393 | End: 2018-12-13
Payer: MEDICAID

## 2018-12-13 VITALS
WEIGHT: 165.13 LBS | SYSTOLIC BLOOD PRESSURE: 110 MMHG | HEIGHT: 65 IN | TEMPERATURE: 99 F | BODY MASS INDEX: 27.51 KG/M2 | DIASTOLIC BLOOD PRESSURE: 68 MMHG

## 2018-12-13 DIAGNOSIS — N30.11 INTERSTITIAL CYSTITIS (CHRONIC) WITH HEMATURIA: ICD-10-CM

## 2018-12-13 DIAGNOSIS — N30.10 INTERSTITIAL CYSTITIS: Primary | ICD-10-CM

## 2018-12-13 DIAGNOSIS — K65.1 INTRA-ABDOMINAL ABSCESS: ICD-10-CM

## 2018-12-13 DIAGNOSIS — K61.1 PERIRECTAL ABSCESS: ICD-10-CM

## 2018-12-13 DIAGNOSIS — R10.9 ABDOMINAL PAIN, UNSPECIFIED ABDOMINAL LOCATION: Primary | ICD-10-CM

## 2018-12-13 DIAGNOSIS — R10.2 FEMALE PELVIC PAIN: ICD-10-CM

## 2018-12-13 LAB
ABO + RH BLD: NORMAL
ALBUMIN SERPL BCP-MCNC: 4.1 G/DL
ALP SERPL-CCNC: 35 U/L
ALT SERPL W/O P-5'-P-CCNC: 23 U/L
ANION GAP SERPL CALC-SCNC: 10 MMOL/L
APTT BLDCRRT: 24.8 SEC
AST SERPL-CCNC: 18 U/L
B-HCG UR QL: NEGATIVE
BACTERIA #/AREA URNS HPF: NORMAL /HPF
BASOPHILS # BLD AUTO: 0.01 K/UL
BASOPHILS NFR BLD: 0.1 %
BILIRUB SERPL-MCNC: 0.2 MG/DL
BILIRUB UR QL STRIP: NEGATIVE
BLD GP AB SCN CELLS X3 SERPL QL: NORMAL
BUN SERPL-MCNC: 13 MG/DL
CALCIUM SERPL-MCNC: 9.3 MG/DL
CHLORIDE SERPL-SCNC: 107 MMOL/L
CLARITY UR: CLEAR
CO2 SERPL-SCNC: 24 MMOL/L
COLOR UR: YELLOW
CREAT SERPL-MCNC: 0.8 MG/DL
CTP QC/QA: YES
DIFFERENTIAL METHOD: NORMAL
EOSINOPHIL # BLD AUTO: 0.1 K/UL
EOSINOPHIL NFR BLD: 0.6 %
ERYTHROCYTE [DISTWIDTH] IN BLOOD BY AUTOMATED COUNT: 13.1 %
EST. GFR  (AFRICAN AMERICAN): >60 ML/MIN/1.73 M^2
EST. GFR  (NON AFRICAN AMERICAN): >60 ML/MIN/1.73 M^2
GLUCOSE SERPL-MCNC: 89 MG/DL
GLUCOSE UR QL STRIP: NEGATIVE
HCT VFR BLD AUTO: 39 %
HGB BLD-MCNC: 12.9 G/DL
HGB UR QL STRIP: ABNORMAL
INR PPP: 1
KETONES UR QL STRIP: NEGATIVE
LACTATE SERPL-SCNC: 1.3 MMOL/L
LEUKOCYTE ESTERASE UR QL STRIP: ABNORMAL
LYMPHOCYTES # BLD AUTO: 2.3 K/UL
LYMPHOCYTES NFR BLD: 25.3 %
MCH RBC QN AUTO: 30.1 PG
MCHC RBC AUTO-ENTMCNC: 33.1 G/DL
MCV RBC AUTO: 91 FL
MICROSCOPIC COMMENT: NORMAL
MONOCYTES # BLD AUTO: 0.6 K/UL
MONOCYTES NFR BLD: 6.2 %
NEUTROPHILS # BLD AUTO: 6 K/UL
NEUTROPHILS NFR BLD: 67.7 %
NITRITE UR QL STRIP: NEGATIVE
PH UR STRIP: 5 [PH] (ref 5–8)
PLATELET # BLD AUTO: 247 K/UL
PMV BLD AUTO: 9.8 FL
POTASSIUM SERPL-SCNC: 3.9 MMOL/L
PROT SERPL-MCNC: 7.7 G/DL
PROT UR QL STRIP: NEGATIVE
PROTHROMBIN TIME: 10.7 SEC
RBC # BLD AUTO: 4.29 M/UL
RBC #/AREA URNS HPF: 1 /HPF (ref 0–4)
SODIUM SERPL-SCNC: 141 MMOL/L
SP GR UR STRIP: 1.01 (ref 1–1.03)
SQUAMOUS #/AREA URNS HPF: 4 /HPF
URN SPEC COLLECT METH UR: ABNORMAL
UROBILINOGEN UR STRIP-ACNC: NEGATIVE EU/DL
WBC # BLD AUTO: 8.9 K/UL
WBC #/AREA URNS HPF: 1 /HPF (ref 0–5)

## 2018-12-13 PROCEDURE — 85610 PROTHROMBIN TIME: CPT

## 2018-12-13 PROCEDURE — 25500020 PHARM REV CODE 255: Performed by: EMERGENCY MEDICINE

## 2018-12-13 PROCEDURE — 96365 THER/PROPH/DIAG IV INF INIT: CPT

## 2018-12-13 PROCEDURE — 63600175 PHARM REV CODE 636 W HCPCS: Performed by: NURSE PRACTITIONER

## 2018-12-13 PROCEDURE — 96376 TX/PRO/DX INJ SAME DRUG ADON: CPT

## 2018-12-13 PROCEDURE — 25000003 PHARM REV CODE 250: Performed by: EMERGENCY MEDICINE

## 2018-12-13 PROCEDURE — 83605 ASSAY OF LACTIC ACID: CPT

## 2018-12-13 PROCEDURE — 25000003 PHARM REV CODE 250: Performed by: STUDENT IN AN ORGANIZED HEALTH CARE EDUCATION/TRAINING PROGRAM

## 2018-12-13 PROCEDURE — 80053 COMPREHEN METABOLIC PANEL: CPT

## 2018-12-13 PROCEDURE — 99285 EMERGENCY DEPT VISIT HI MDM: CPT | Mod: 25,27

## 2018-12-13 PROCEDURE — 86850 RBC ANTIBODY SCREEN: CPT

## 2018-12-13 PROCEDURE — 85025 COMPLETE CBC W/AUTO DIFF WBC: CPT

## 2018-12-13 PROCEDURE — 99213 OFFICE O/P EST LOW 20 MIN: CPT | Mod: PBBFAC | Performed by: OBSTETRICS & GYNECOLOGY

## 2018-12-13 PROCEDURE — 81000 URINALYSIS NONAUTO W/SCOPE: CPT

## 2018-12-13 PROCEDURE — 99999 PR PBB SHADOW E&M-EST. PATIENT-LVL III: CPT | Mod: PBBFAC,,, | Performed by: OBSTETRICS & GYNECOLOGY

## 2018-12-13 PROCEDURE — 81025 URINE PREGNANCY TEST: CPT | Performed by: NURSE PRACTITIONER

## 2018-12-13 PROCEDURE — 11000001 HC ACUTE MED/SURG PRIVATE ROOM

## 2018-12-13 PROCEDURE — S4991 NICOTINE PATCH NONLEGEND: HCPCS | Performed by: EMERGENCY MEDICINE

## 2018-12-13 PROCEDURE — 96375 TX/PRO/DX INJ NEW DRUG ADDON: CPT

## 2018-12-13 PROCEDURE — G0378 HOSPITAL OBSERVATION PER HR: HCPCS

## 2018-12-13 PROCEDURE — 87076 CULTURE ANAEROBE IDENT EACH: CPT

## 2018-12-13 PROCEDURE — S5010 5% DEXTROSE AND 0.45% SALINE: HCPCS | Performed by: STUDENT IN AN ORGANIZED HEALTH CARE EDUCATION/TRAINING PROGRAM

## 2018-12-13 PROCEDURE — 99213 OFFICE O/P EST LOW 20 MIN: CPT | Mod: S$PBB,,, | Performed by: OBSTETRICS & GYNECOLOGY

## 2018-12-13 PROCEDURE — 87040 BLOOD CULTURE FOR BACTERIA: CPT

## 2018-12-13 PROCEDURE — 85730 THROMBOPLASTIN TIME PARTIAL: CPT

## 2018-12-13 RX ORDER — HYDROMORPHONE HYDROCHLORIDE 2 MG/ML
0.5 INJECTION, SOLUTION INTRAMUSCULAR; INTRAVENOUS; SUBCUTANEOUS
Status: COMPLETED | OUTPATIENT
Start: 2018-12-13 | End: 2018-12-13

## 2018-12-13 RX ORDER — GABAPENTIN 300 MG/1
600 CAPSULE ORAL 3 TIMES DAILY
Status: DISCONTINUED | OUTPATIENT
Start: 2018-12-13 | End: 2018-12-17 | Stop reason: HOSPADM

## 2018-12-13 RX ORDER — DEXTROSE MONOHYDRATE AND SODIUM CHLORIDE 5; .45 G/100ML; G/100ML
INJECTION, SOLUTION INTRAVENOUS CONTINUOUS
Status: DISCONTINUED | OUTPATIENT
Start: 2018-12-13 | End: 2018-12-16

## 2018-12-13 RX ORDER — PHENTERMINE HYDROCHLORIDE 37.5 MG/1
18.75 TABLET ORAL 2 TIMES DAILY
Refills: 0 | COMMUNITY
Start: 2018-11-16 | End: 2018-12-26

## 2018-12-13 RX ORDER — POLYETHYLENE GLYCOL 3350 17 G/17G
17 POWDER, FOR SOLUTION ORAL ONCE
Status: COMPLETED | OUTPATIENT
Start: 2018-12-13 | End: 2018-12-13

## 2018-12-13 RX ORDER — ONDANSETRON 2 MG/ML
4 INJECTION INTRAMUSCULAR; INTRAVENOUS
Status: COMPLETED | OUTPATIENT
Start: 2018-12-13 | End: 2018-12-13

## 2018-12-13 RX ORDER — NICOTINE 7MG/24HR
1 PATCH, TRANSDERMAL 24 HOURS TRANSDERMAL DAILY
Status: DISCONTINUED | OUTPATIENT
Start: 2018-12-13 | End: 2018-12-17 | Stop reason: HOSPADM

## 2018-12-13 RX ORDER — ACETAMINOPHEN 325 MG/1
650 TABLET ORAL EVERY 8 HOURS PRN
Status: DISCONTINUED | OUTPATIENT
Start: 2018-12-13 | End: 2018-12-14

## 2018-12-13 RX ORDER — DICYCLOMINE HYDROCHLORIDE 20 MG/1
20 TABLET ORAL 3 TIMES DAILY PRN
Qty: 30 TABLET | Refills: 0 | Status: SHIPPED | OUTPATIENT
Start: 2018-12-13 | End: 2019-12-13

## 2018-12-13 RX ORDER — ONDANSETRON 2 MG/ML
4 INJECTION INTRAMUSCULAR; INTRAVENOUS EVERY 8 HOURS PRN
Status: DISCONTINUED | OUTPATIENT
Start: 2018-12-13 | End: 2018-12-17 | Stop reason: HOSPADM

## 2018-12-13 RX ORDER — NICOTINE 7MG/24HR
1 PATCH, TRANSDERMAL 24 HOURS TRANSDERMAL DAILY
Status: DISCONTINUED | OUTPATIENT
Start: 2018-12-14 | End: 2018-12-13

## 2018-12-13 RX ORDER — BISACODYL 10 MG
10 SUPPOSITORY, RECTAL RECTAL DAILY PRN
Status: DISCONTINUED | OUTPATIENT
Start: 2018-12-13 | End: 2018-12-17

## 2018-12-13 RX ORDER — AMOXICILLIN 250 MG
1 CAPSULE ORAL 2 TIMES DAILY
Status: DISCONTINUED | OUTPATIENT
Start: 2018-12-13 | End: 2018-12-14

## 2018-12-13 RX ORDER — MORPHINE SULFATE 4 MG/ML
3 INJECTION, SOLUTION INTRAMUSCULAR; INTRAVENOUS
Status: COMPLETED | OUTPATIENT
Start: 2018-12-13 | End: 2018-12-13

## 2018-12-13 RX ORDER — MORPHINE SULFATE 4 MG/ML
5 INJECTION, SOLUTION INTRAMUSCULAR; INTRAVENOUS
Status: COMPLETED | OUTPATIENT
Start: 2018-12-13 | End: 2018-12-13

## 2018-12-13 RX ORDER — HYDROMORPHONE HYDROCHLORIDE 2 MG/ML
1 INJECTION, SOLUTION INTRAMUSCULAR; INTRAVENOUS; SUBCUTANEOUS EVERY 4 HOURS PRN
Status: DISCONTINUED | OUTPATIENT
Start: 2018-12-13 | End: 2018-12-14 | Stop reason: SDUPTHER

## 2018-12-13 RX ORDER — ENOXAPARIN SODIUM 100 MG/ML
40 INJECTION SUBCUTANEOUS EVERY 24 HOURS
Status: DISCONTINUED | OUTPATIENT
Start: 2018-12-13 | End: 2018-12-13

## 2018-12-13 RX ORDER — HYDROMORPHONE HYDROCHLORIDE 2 MG/ML
1 INJECTION, SOLUTION INTRAMUSCULAR; INTRAVENOUS; SUBCUTANEOUS
Status: COMPLETED | OUTPATIENT
Start: 2018-12-13 | End: 2018-12-13

## 2018-12-13 RX ADMIN — POLYETHYLENE GLYCOL 3350 17 G: 17 POWDER, FOR SOLUTION ORAL at 10:12

## 2018-12-13 RX ADMIN — DEXTROSE AND SODIUM CHLORIDE: 5; .45 INJECTION, SOLUTION INTRAVENOUS at 10:12

## 2018-12-13 RX ADMIN — NICOTINE 1 PATCH: 7 PATCH, EXTENDED RELEASE TRANSDERMAL at 09:12

## 2018-12-13 RX ADMIN — HYDROMORPHONE HYDROCHLORIDE 0.5 MG: 2 INJECTION, SOLUTION INTRAMUSCULAR; INTRAVENOUS; SUBCUTANEOUS at 08:12

## 2018-12-13 RX ADMIN — HYDROMORPHONE HYDROCHLORIDE 1 MG: 2 INJECTION, SOLUTION INTRAMUSCULAR; INTRAVENOUS; SUBCUTANEOUS at 09:12

## 2018-12-13 RX ADMIN — ONDANSETRON 4 MG: 2 INJECTION INTRAMUSCULAR; INTRAVENOUS at 05:12

## 2018-12-13 RX ADMIN — MORPHINE SULFATE 5 MG: 4 INJECTION, SOLUTION INTRAMUSCULAR; INTRAVENOUS at 05:12

## 2018-12-13 RX ADMIN — IOHEXOL 75 ML: 350 INJECTION, SOLUTION INTRAVENOUS at 06:12

## 2018-12-13 RX ADMIN — STANDARDIZED SENNA CONCENTRATE AND DOCUSATE SODIUM 1 TABLET: 8.6; 5 TABLET, FILM COATED ORAL at 10:12

## 2018-12-13 RX ADMIN — GABAPENTIN 600 MG: 300 CAPSULE ORAL at 10:12

## 2018-12-13 RX ADMIN — PIPERACILLIN AND TAZOBACTAM 4.5 G: 4; .5 INJECTION, POWDER, LYOPHILIZED, FOR SOLUTION INTRAVENOUS; PARENTERAL at 07:12

## 2018-12-13 RX ADMIN — MORPHINE SULFATE 3 MG: 4 INJECTION, SOLUTION INTRAMUSCULAR; INTRAVENOUS at 06:12

## 2018-12-13 NOTE — ED PROVIDER NOTES
Encounter Date: 12/13/2018    This is a SORT/MSE of a 28 y.o. female presenting to the ED with c/o pelvic pain and pressure sensation. Was evaluated by OB/GYN today and prescribed bentyl. Care will be transferred to an alternate provider when patient is roomed for a full evaluation and final disposition. DORITA Roy, FNP-C 12/13/2018 4:52 PM       History     Chief Complaint   Patient presents with    Pelvic Pain     Pt seen by GYN for pelvic pain and sent to ED for further evaluation. Pt says there was blood in her urine at GYN. Symptoms started about 1pm     Chief complaint:  Pelvic pain    History of present illness:  Patient is a 28-year-old female states that at 13:00 she was having intercourse when she has had a sudden onset of suprapubic and lower back pain that is constant and feels like cramps.  She also says it feels like difficulty french and urinary urgency without the ability to do either.  Reports that squatting causes decreased pain and standing makes it worse.  Current severity pain is 9/10.  History significant for interstitial cystitis, kidney stones, migraine headaches, urinary tract infections.      The history is provided by the patient and medical records. No  was used.     Review of patient's allergies indicates:  No Known Allergies  Past Medical History:   Diagnosis Date    Interstitial cystitis     Kidney stone     Migraine     UTI (urinary tract infection)      Past Surgical History:   Procedure Laterality Date    ABDOMINAL SURGERY      [place] EXPLORATORY OF ABDOMEN       Family History   Problem Relation Age of Onset    Cancer Mother     Diabetes Father      Social History     Tobacco Use    Smoking status: Current Every Day Smoker     Packs/day: 0.50    Smokeless tobacco: Never Used   Substance Use Topics    Alcohol use: Yes     Alcohol/week: 0.0 oz     Comment: occassionally    Drug use: Yes     Types: Marijuana     Review of Systems   Constitutional:  Negative for chills, fatigue and fever.   HENT: Negative for congestion, ear discharge, ear pain, postnasal drip, rhinorrhea, sinus pressure, sneezing, sore throat and voice change.    Eyes: Negative for discharge and itching.   Respiratory: Negative for cough, shortness of breath and wheezing.    Cardiovascular: Negative for chest pain, palpitations and leg swelling.   Gastrointestinal: Positive for abdominal pain, constipation, nausea and rectal pain. Negative for diarrhea and vomiting.   Endocrine: Negative for polydipsia, polyphagia and polyuria.   Genitourinary: Positive for difficulty urinating and pelvic pain. Negative for dysuria, frequency, hematuria, urgency, vaginal bleeding, vaginal discharge and vaginal pain.   Musculoskeletal: Negative for arthralgias and myalgias.   Skin: Negative for rash and wound.   Neurological: Negative for dizziness, seizures, syncope, weakness and numbness.   Hematological: Negative for adenopathy. Does not bruise/bleed easily.   Psychiatric/Behavioral: Negative for self-injury and suicidal ideas. The patient is not nervous/anxious.        Physical Exam     Initial Vitals [12/13/18 1654]   BP Pulse Resp Temp SpO2   117/74 70 18 98.6 °F (37 °C) 100 %      MAP       --         Physical Exam    Nursing note and vitals reviewed.  Constitutional: She appears well-developed and well-nourished.   HENT:   Head: Normocephalic and atraumatic.   Right Ear: External ear normal.   Left Ear: External ear normal.   Nose: Nose normal.   Eyes: Conjunctivae and EOM are normal. Pupils are equal, round, and reactive to light. Right eye exhibits no discharge. Left eye exhibits no discharge.   Neck: Normal range of motion.   Abdominal: Soft. Normal appearance and bowel sounds are normal. She exhibits no distension. There is no tenderness.   Genitourinary: Rectum normal. Rectal exam shows no external hemorrhoid, no internal hemorrhoid, no fissure, no mass, no tenderness, anal tone normal and guaiac  negative stool. Guaiac negative stool. : Acceptable.  Musculoskeletal: Normal range of motion.   Neurological: She is alert and oriented to person, place, and time.   Skin: Skin is dry. Capillary refill takes less than 2 seconds.         ED Course   Procedures  Labs Reviewed   URINALYSIS, REFLEX TO URINE CULTURE - Abnormal; Notable for the following components:       Result Value    Occult Blood UA 2+ (*)     Leukocytes, UA Trace (*)     All other components within normal limits    Narrative:     Preferred Collection Type->Urine, Clean Catch   COMPREHENSIVE METABOLIC PANEL - Abnormal; Notable for the following components:    Alkaline Phosphatase 35 (*)     All other components within normal limits   CULTURE, BLOOD   CULTURE, BLOOD   CBC W/ AUTO DIFFERENTIAL   URINALYSIS MICROSCOPIC    Narrative:     Preferred Collection Type->Urine, Clean Catch   LACTIC ACID, PLASMA   APTT   PROTIME-INR   POCT URINE PREGNANCY   TYPE & SCREEN          Imaging Results          CT Abdomen Pelvis With Contrast (Edited Result - FINAL)  Result time 12/13/18 19:01:36    Addendum 1 of 1 by Mickey Araujo MD (12/13/18 19:01:36)      The intramural collection is in the presacral region at the junction of the sigmoid and rectum and is approximately 10 cm from the anal verge.    Case discussed with DIVINE Palacios on 12/13/2018 at 19:00 hours.      Electronically signed by: Mickey Araujo MD  Date:    12/13/2018  Time:    19:01               Final result by Mickey Araujo MD (12/13/18 18:37:26)                 Impression:      Wall thickening and abnormal mural enhancement involving the distal sigmoid colon and the rectum with thickening of the perirectal fascia.  The findings represent nonspecific proctocolitis.  Gastroenterology follow-up is suggested.  Endoscopic correlation is recommended after the patient's acute symptoms resolve.    1.6 cm ill-defined collection which appears to be adjacent to the wall of the rectum asymmetric to the  left.  The findings may represent evolving intramural abscess.  Short-term follow-up and preferably with MRI of the pelvis without and with contrast may be attempted for further evaluation.      Electronically signed by: Mickey Araujo MD  Date:    12/13/2018  Time:    18:37             Narrative:    EXAMINATION:  CT ABDOMEN PELVIS WITH CONTRAST    CLINICAL HISTORY:  defecatory urgency, urinary urgency, sudden onset suprapubic pain during intercourse;    TECHNIQUE:  Low dose axial images, sagittal and coronal reformations were obtained from the lung bases to the pubic symphysis following the IV administration of 75 mL of Omnipaque 350 .  Oral contrast was not given.    COMPARISON:  CT scan of the abdomen and pelvis dated 09/11/2018.    FINDINGS:  There are no pleural effusions.  There is no evidence of a pneumothorax.  No airspace opacity is present.  No pulmonary nodule is identified.    The heart is unremarkable.  There is normal tapering of the abdominal aorta.  The aortic branch vessels are within normal limits.  The portal veins and mesenteric vessels are within normal limits.  The IVC and the remainder of the venous structures are unremarkable.  There is no evidence of lymphadenopathy in the abdomen or pelvis.    The esophagus is unremarkable.  There is distention of the stomach.  There is mild prominence of the duodenum.  The small bowel loops are unremarkable.  The appendix is within normal limits.  There is mild circumferential wall thickening involving the distal aspects of the sigmoid colon and in the rectum.  There is also abnormal mural enhancement involving the rectum and the distal portions of the sigmoid colon.  There is a 1.6 cm ill-defined collection which appears to be adjacent to the wall of the rectum asymmetric to the left.  There is thickening of the perirectal fascia.    The liver is unremarkable.  The gallbladder is within normal limits.  The biliary tree is unremarkable.  The spleen is  unremarkable.  The pancreas is within normal limits.    The adrenal glands are unremarkable.  The kidneys are unremarkable.  The ureters and urinary bladder are unremarkable.  The uterus and adnexal structures are unremarkable..    There is no evidence of free fluid in the abdomen or pelvis.  There is no evidence of free air.  There is no evidence of pneumatosis.    The psoas margins are unremarkable.  The abdominal wall is unremarkable.  There are degenerative changes in the osseous structures.  There is no evidence of a fracture.  There is a central disc protrusion at the L5-S1 level.                                              Attending Attestation:   Physician Attestation Statement for Resident:  As the supervising MD  I agree with the above history. -:   As the supervising MD I agree with the above PE.    As the supervising MD I agree with the above treatment, course, plan, and disposition.   -: I have staffed the patient with the midlevel provider and was available for consultation in the department. I have guided the treatment plan and agree with the care provided.                      ED Course as of Dec 13 2101   Thu Dec 13, 2018   4297 Page out to Aman Johnson MD.    Initial assessment: 28 y.o. Wf who reports whilest having vaginal intercourse at 1pm she suddenly experienced sharp pain in suprapubic region and anus.  Experienced minor incontinence of stool.  Later defecated large blood clot. Has mild vag bleeding.  Came direct from Aman Johnson's office.    Ddx: traumatic fistula, std, uti    Orders: CBC, CMP, POCT ua, UPT, CT abd pel with contrast, IV, MSO4 5mg ivp, zofran 4mg ivp.  [VC]   1710 BP: 117/74 [VC]   1710 Temp: 98.6 °F (37 °C) [VC]   1710 Pulse: 70 [VC]   1710 Resp: 18 [VC]   1710 SpO2: 100 % [VC]   1722 Dr. Johnson states that pt has a long hx of pain with no etiology.  Reports bladder was tender, went to urology, was dx with IC and was prescribed medication for which medicaid won't pay.  Reports  that she had a hernia, went to gen surg and was found to not have a hernia.  Was referred to pain clinic and was given neurontin, pt refused to use based on side effects.  Reports his vag exam was normal with exception of minimal blood anteriorly.  Urine dip showed pos blood.  [VC]   1743 Rectal exam reveals no tenderness, normal muscle tone, no external hemorrhoids.   Guiac is positive.  [VC]   1750 CBC: leukocyte count was normal, the H&H was normal. The platelet count was normal.       [VC]   1809 The chemistry was negative for hypo-or hyper natremia, kalemia, chloridemia, or other electrolyte abnormalities; BUN and creatinine were within normal limits indicating normal kidney function, ALT and AST were within normal limits indicating normal liver function, there was no transaminitis.      UA is negative for infection, no nitrites, leukocytes, blood, or protein present.    [VC]   1844 General surgery paged through unit secretary.  [VC]   1854 SBAR given to Dr. Powers.  She would like to know the depth of the abscess or collection of fluid.  Called [phone], Dr. Benton will contact Dr. Araujo to get this information and have him call us, then I will return call to Dr. Powers at [phone].  [VC]   1903 BP: 130/78 [VC]   1904 Temp: 98.4 °F (36.9 °C) [VC]   1904 Pulse: 67 [VC]   1904 Resp: 16 [VC]   1904 SpO2: 97 % [VC]   1907 Dr. Araujo states that the collection is intramural near the rectosigmoid juncture.  This was relayed to Dr. Powers.  She would like to know if IR is a better approach than surgical.  She will contact radiologist herself and call us back.  [VC]   2014 Protime: 10.7 [VC]   2015 Coumadin Monitoring INR: 1.0 [VC]   2015 aPTT: 24.8 [VC]   2015 Lactate, Luciano: 1.3 [VC]   2044 Dr. Powers contacted, states she will be here shortly.  [VC]   2100 Dr. Powers here, SBAR given, she has independently examined the patient and is now writing orders for inpatient care.  [VC]      ED Course User  Index  [VC] Shree Wallace DNP     Clinical Impression:   The primary encounter diagnosis was Intra-abdominal abscess. A diagnosis of Perirectal abscess was also pertinent to this visit.      Disposition:   Disposition: Admitted  Condition: Stable                        Shree Wallace DNP  12/13/18 2102       Deanne Betts MD  12/13/18 2105

## 2018-12-13 NOTE — ED TRIAGE NOTES
"Patient reports bladder pain and low back pressure after having sex today.  Patient denies urinary problems.  Patient states "it feels like I have to have a bowel movement and pee at the same time".  Was seen by her OB today and while there while trying to give a urine sample she urinated nothing but blood and her OB told her to come to the ED.  "

## 2018-12-13 NOTE — PROGRESS NOTES
"  Subjective:       Patient ID: Crystal Tsai is a 28 y.o. female.    Chief Complaint:  Pelvic Pain and Dyspareunia      History of Present Illness  HPI  Patient comes in today with "excruciating pain" for the past 4-5 hours.  Stated that she "pooped out blood"  Pain is "soooo much pressure in the front."  ?Blood in her stool in a picture on her phone.  Cramps and "Squeezing"  No nausea or vomiting.  No fever or chills.    History of abdominal and pelvic pain.  Evaluated by .  ?IC.  But Medicaid refused to pay for [place]  Evaluated by General Surgery for possible hernia.  None found.    Pelvic Ultrasound on 1/3/2018 was normal.  CT scan on 2018 was also normal.    Saw Pain Management in May 2018.  Placed on Neurontin.  But not taking regularly.  "Only when I am in pain".          GYN & OB History  Patient's last menstrual period was 11/15/2018 (within days).   Date of Last Pap: 2013    OB History    Para Term  AB Living   2 2 2     2   SAB TAB Ectopic Multiple Live Births           2      # Outcome Date GA Lbr Tyler/2nd Weight Sex Delivery Anes PTL Lv   2 Term 14 40w0d  3.317 kg (7 lb 5 oz) F   N RAJ   1 Term 07 40w0d  3.26 kg (7 lb 3 oz) F Vag-Spont EPI N RAJ        Past Medical History:   Diagnosis Date    Interstitial cystitis     Kidney stone     Migraine     UTI (urinary tract infection)        Past Surgical History:   Procedure Laterality Date    ABDOMINAL SURGERY      [place] EXPLORATORY OF ABDOMEN         Family History   Problem Relation Age of Onset    Cancer Mother     Diabetes Father        Social History     Socioeconomic History    Marital status: Single     Spouse name: None    Number of children: None    Years of education: None    Highest education level: None   Social Needs    Financial resource strain: None    Food insecurity - worry: None    Food insecurity - inability: None    Transportation needs - medical: None    Transportation needs " "- non-medical: None   Occupational History    None   Tobacco Use    Smoking status: Current Every Day Smoker     Packs/day: 0.50    Smokeless tobacco: Never Used   Substance and Sexual Activity    Alcohol use: Yes     Alcohol/week: 0.0 oz     Comment: occassionally    Drug use: Yes     Types: Marijuana    Sexual activity: Yes     Partners: Male     Birth control/protection: None   Other Topics Concern    None   Social History Narrative    She has a high school diploma. Patient is currently not working. She has been together with the same person for over 3 years. He works as a .                Current Outpatient Medications   Medication Sig Dispense Refill    gabapentin (NEURONTIN) 300 MG capsule Take 2 capsules (600 mg total) by mouth 3 (three) times daily. 180 capsule 11    phentermine (ADIPEX-P) 37.5 mg tablet Take 18.75 mg by mouth 2 (two) times daily.  0    VYFEMLA, 28, 0.4-35 mg-mcg per tablet TAKE 1 TABLET BY MOUTH EVERY DAY 28 tablet 6     No current facility-administered medications for this visit.        Review of patient's allergies indicates:  No Known Allergies      Review of Systems  Review of Systems   Constitutional: Positive for fatigue. Negative for activity change, appetite change, chills, fever and unexpected weight change.   HENT: Negative for mouth sores.    Respiratory: Negative for cough, shortness of breath and wheezing.    Cardiovascular: Negative for chest pain and palpitations.   Gastrointestinal: Positive for abdominal pain. Negative for bloating, blood in stool, constipation, nausea and vomiting.        Left upper and lower quadrant pain.  More over symphysis in the midline.  Having a bulge on the left side of lower abdomen.  "Comes and goes"   Endocrine: Negative for diabetes and hot flashes.   Genitourinary: Positive for dyspareunia, frequency and pelvic pain. Negative for hematuria, menorrhagia, menstrual problem, urgency, vaginal bleeding, vaginal discharge, vaginal " pain, dysmenorrhea, urinary incontinence, postcoital bleeding and vaginal odor.   Musculoskeletal: Negative for back pain and myalgias.   Neurological: Negative for seizures and headaches.   Psychiatric/Behavioral: Positive for sleep disturbance. Negative for depression. The patient is nervous/anxious.         Irritated   Breast: Negative for mass, mastodynia and nipple discharge          Objective:    Physical Exam:   Constitutional: She appears well-developed and well-nourished. She appears distressed.   In pain.    HENT:   Head: Normocephalic and atraumatic.    Eyes: EOM are normal.    Neck: Normal range of motion.     Pulmonary/Chest: Effort normal. No respiratory distress.   Breasts: Non-tender, no engorgement, no masses, no retraction, no discharge. Negative for lymphadenopathy.         Abdominal: Soft. She exhibits no distension. There is tenderness. There is guarding. There is no rebound.     Genitourinary: Uterus normal. Vaginal discharge found.   Genitourinary Comments: Vulva without any obvious lesions.  Vaginal vault with good support.  Minimal pinkish discharge noted.  No obvious lesion.  Cervix is with cervical motion tenderness.  No obvious lesion.  Uterus is small, ?tender, normal contour.  Adnexa is without any masses or tenderness.    Exquisite bladder tenderness           Musculoskeletal: Normal range of motion.       Neurological: She is alert.    Skin: Skin is warm and dry.    Psychiatric: She has a normal mood and affect.       Urine dipstick with microscopic hematuria     Assessment:        1. Abdominal pain, unspecified abdominal location    2. Female pelvic pain    3. Interstitial cystitis (chronic) with hematuria              Plan:      I have extensively discussed with the patient regarding her condition  She does NOT seem to have pain from GYN source today.  Bentyl given for possible IBS.  She will be referred to our Emergency Department for a more complete work-up

## 2018-12-14 LAB
ANION GAP SERPL CALC-SCNC: 4 MMOL/L
BASOPHILS # BLD AUTO: 0.01 K/UL
BASOPHILS NFR BLD: 0.1 %
BUN SERPL-MCNC: 11 MG/DL
CALCIUM SERPL-MCNC: 8.3 MG/DL
CHLORIDE SERPL-SCNC: 106 MMOL/L
CO2 SERPL-SCNC: 27 MMOL/L
CREAT SERPL-MCNC: 0.8 MG/DL
CRP SERPL-MCNC: 1 MG/L
DIFFERENTIAL METHOD: ABNORMAL
EOSINOPHIL # BLD AUTO: 0.1 K/UL
EOSINOPHIL NFR BLD: 1 %
ERYTHROCYTE [DISTWIDTH] IN BLOOD BY AUTOMATED COUNT: 13.1 %
EST. GFR  (AFRICAN AMERICAN): >60 ML/MIN/1.73 M^2
EST. GFR  (NON AFRICAN AMERICAN): >60 ML/MIN/1.73 M^2
GLUCOSE SERPL-MCNC: 86 MG/DL
HCT VFR BLD AUTO: 34.1 %
HGB BLD-MCNC: 11.4 G/DL
LYMPHOCYTES # BLD AUTO: 2.2 K/UL
LYMPHOCYTES NFR BLD: 29.7 %
MCH RBC QN AUTO: 30.8 PG
MCHC RBC AUTO-ENTMCNC: 33.4 G/DL
MCV RBC AUTO: 92 FL
MONOCYTES # BLD AUTO: 0.5 K/UL
MONOCYTES NFR BLD: 7.4 %
NEUTROPHILS # BLD AUTO: 4.5 K/UL
NEUTROPHILS NFR BLD: 61.8 %
PLATELET # BLD AUTO: 208 K/UL
PMV BLD AUTO: 10.1 FL
POTASSIUM SERPL-SCNC: 3.8 MMOL/L
RBC # BLD AUTO: 3.7 M/UL
SODIUM SERPL-SCNC: 137 MMOL/L
WBC # BLD AUTO: 7.28 K/UL

## 2018-12-14 PROCEDURE — G0378 HOSPITAL OBSERVATION PER HR: HCPCS

## 2018-12-14 PROCEDURE — 25000003 PHARM REV CODE 250: Performed by: EMERGENCY MEDICINE

## 2018-12-14 PROCEDURE — 25000003 PHARM REV CODE 250: Performed by: SURGERY

## 2018-12-14 PROCEDURE — S5010 5% DEXTROSE AND 0.45% SALINE: HCPCS | Performed by: SURGERY

## 2018-12-14 PROCEDURE — 63600175 PHARM REV CODE 636 W HCPCS: Performed by: SURGERY

## 2018-12-14 PROCEDURE — 25000003 PHARM REV CODE 250: Performed by: NURSE PRACTITIONER

## 2018-12-14 PROCEDURE — 11000001 HC ACUTE MED/SURG PRIVATE ROOM

## 2018-12-14 PROCEDURE — 25000003 PHARM REV CODE 250: Performed by: STUDENT IN AN ORGANIZED HEALTH CARE EDUCATION/TRAINING PROGRAM

## 2018-12-14 PROCEDURE — 63600175 PHARM REV CODE 636 W HCPCS: Performed by: STUDENT IN AN ORGANIZED HEALTH CARE EDUCATION/TRAINING PROGRAM

## 2018-12-14 PROCEDURE — 80048 BASIC METABOLIC PNL TOTAL CA: CPT

## 2018-12-14 PROCEDURE — S4991 NICOTINE PATCH NONLEGEND: HCPCS | Performed by: EMERGENCY MEDICINE

## 2018-12-14 PROCEDURE — 85025 COMPLETE CBC W/AUTO DIFF WBC: CPT

## 2018-12-14 PROCEDURE — 94761 N-INVAS EAR/PLS OXIMETRY MLT: CPT

## 2018-12-14 PROCEDURE — 63600175 PHARM REV CODE 636 W HCPCS: Performed by: NURSE PRACTITIONER

## 2018-12-14 PROCEDURE — 86140 C-REACTIVE PROTEIN: CPT

## 2018-12-14 PROCEDURE — 36415 COLL VENOUS BLD VENIPUNCTURE: CPT

## 2018-12-14 RX ORDER — LIDOCAINE 50 MG/G
1 PATCH TOPICAL ONCE
Status: COMPLETED | OUTPATIENT
Start: 2018-12-14 | End: 2018-12-14

## 2018-12-14 RX ORDER — OXYCODONE AND ACETAMINOPHEN 5; 325 MG/1; MG/1
1 TABLET ORAL EVERY 4 HOURS PRN
Status: DISCONTINUED | OUTPATIENT
Start: 2018-12-14 | End: 2018-12-14

## 2018-12-14 RX ORDER — SCOLOPAMINE TRANSDERMAL SYSTEM 1 MG/1
1 PATCH, EXTENDED RELEASE TRANSDERMAL
Status: DISCONTINUED | OUTPATIENT
Start: 2018-12-13 | End: 2018-12-17 | Stop reason: HOSPADM

## 2018-12-14 RX ORDER — ACETAMINOPHEN 325 MG/1
650 TABLET ORAL EVERY 6 HOURS
Status: DISCONTINUED | OUTPATIENT
Start: 2018-12-14 | End: 2018-12-14

## 2018-12-14 RX ORDER — HYDROMORPHONE HYDROCHLORIDE 2 MG/ML
1 INJECTION, SOLUTION INTRAMUSCULAR; INTRAVENOUS; SUBCUTANEOUS EVERY 4 HOURS PRN
Status: DISCONTINUED | OUTPATIENT
Start: 2018-12-14 | End: 2018-12-15

## 2018-12-14 RX ORDER — LIDOCAINE 50 MG/G
1 PATCH TOPICAL
Status: DISCONTINUED | OUTPATIENT
Start: 2018-12-14 | End: 2018-12-17 | Stop reason: HOSPADM

## 2018-12-14 RX ORDER — ACETAMINOPHEN 325 MG/1
650 TABLET ORAL EVERY 6 HOURS
Status: DISCONTINUED | OUTPATIENT
Start: 2018-12-14 | End: 2018-12-16

## 2018-12-14 RX ORDER — DIPHENHYDRAMINE HYDROCHLORIDE 50 MG/ML
12.5 INJECTION INTRAMUSCULAR; INTRAVENOUS EVERY 6 HOURS PRN
Status: DISCONTINUED | OUTPATIENT
Start: 2018-12-14 | End: 2018-12-17

## 2018-12-14 RX ORDER — OXYCODONE HYDROCHLORIDE 5 MG/1
5 TABLET ORAL EVERY 4 HOURS PRN
Status: DISCONTINUED | OUTPATIENT
Start: 2018-12-14 | End: 2018-12-15

## 2018-12-14 RX ADMIN — LIDOCAINE 1 PATCH: 50 PATCH TOPICAL at 12:12

## 2018-12-14 RX ADMIN — DIPHENHYDRAMINE HYDROCHLORIDE 12.5 MG: 50 INJECTION, SOLUTION INTRAMUSCULAR; INTRAVENOUS at 04:12

## 2018-12-14 RX ADMIN — GABAPENTIN 600 MG: 300 CAPSULE ORAL at 08:12

## 2018-12-14 RX ADMIN — ONDANSETRON 4 MG: 2 INJECTION INTRAMUSCULAR; INTRAVENOUS at 12:12

## 2018-12-14 RX ADMIN — PIPERACILLIN AND TAZOBACTAM 4.5 G: 4; .5 INJECTION, POWDER, LYOPHILIZED, FOR SOLUTION INTRAVENOUS; PARENTERAL at 04:12

## 2018-12-14 RX ADMIN — HYDROMORPHONE HYDROCHLORIDE 1 MG: 2 INJECTION INTRAMUSCULAR; INTRAVENOUS; SUBCUTANEOUS at 12:12

## 2018-12-14 RX ADMIN — PIPERACILLIN AND TAZOBACTAM 4.5 G: 4; .5 INJECTION, POWDER, LYOPHILIZED, FOR SOLUTION INTRAVENOUS; PARENTERAL at 12:12

## 2018-12-14 RX ADMIN — HYDROMORPHONE HYDROCHLORIDE 1 MG: 2 INJECTION INTRAMUSCULAR; INTRAVENOUS; SUBCUTANEOUS at 08:12

## 2018-12-14 RX ADMIN — ACETAMINOPHEN 650 MG: 325 TABLET ORAL at 04:12

## 2018-12-14 RX ADMIN — OXYCODONE HYDROCHLORIDE 5 MG: 5 TABLET ORAL at 05:12

## 2018-12-14 RX ADMIN — HYDROMORPHONE HYDROCHLORIDE 1 MG: 2 INJECTION INTRAMUSCULAR; INTRAVENOUS; SUBCUTANEOUS at 10:12

## 2018-12-14 RX ADMIN — SCOPALAMINE 1 PATCH: 1 PATCH, EXTENDED RELEASE TRANSDERMAL at 12:12

## 2018-12-14 RX ADMIN — NICOTINE 1 PATCH: 7 PATCH, EXTENDED RELEASE TRANSDERMAL at 08:12

## 2018-12-14 RX ADMIN — ACETAMINOPHEN 650 MG: 325 TABLET ORAL at 10:12

## 2018-12-14 RX ADMIN — HYDROMORPHONE HYDROCHLORIDE 1 MG: 2 INJECTION INTRAMUSCULAR; INTRAVENOUS; SUBCUTANEOUS at 07:12

## 2018-12-14 RX ADMIN — GABAPENTIN 600 MG: 300 CAPSULE ORAL at 04:12

## 2018-12-14 RX ADMIN — LIDOCAINE 1 PATCH: 50 PATCH TOPICAL at 09:12

## 2018-12-14 RX ADMIN — OXYCODONE HYDROCHLORIDE 5 MG: 5 TABLET ORAL at 09:12

## 2018-12-14 RX ADMIN — PIPERACILLIN AND TAZOBACTAM 4.5 G: 4; .5 INJECTION, POWDER, LYOPHILIZED, FOR SOLUTION INTRAVENOUS; PARENTERAL at 08:12

## 2018-12-14 RX ADMIN — ACETAMINOPHEN 650 MG: 325 TABLET ORAL at 12:12

## 2018-12-14 RX ADMIN — DEXTROSE AND SODIUM CHLORIDE: 5; .45 INJECTION, SOLUTION INTRAVENOUS at 07:12

## 2018-12-14 RX ADMIN — PROMETHAZINE HYDROCHLORIDE 12.5 MG: 25 INJECTION INTRAMUSCULAR; INTRAVENOUS at 12:12

## 2018-12-14 RX ADMIN — STANDARDIZED SENNA CONCENTRATE AND DOCUSATE SODIUM 1 TABLET: 8.6; 5 TABLET, FILM COATED ORAL at 08:12

## 2018-12-14 RX ADMIN — ONDANSETRON 4 MG: 2 INJECTION INTRAMUSCULAR; INTRAVENOUS at 08:12

## 2018-12-14 RX ADMIN — HYDROMORPHONE HYDROCHLORIDE 1 MG: 2 INJECTION INTRAMUSCULAR; INTRAVENOUS; SUBCUTANEOUS at 04:12

## 2018-12-14 NOTE — PLAN OF CARE
12/14/18 1158   Discharge Assessment   Assessment Type Discharge Planning Assessment   Confirmed/corrected address and phone number on facesheet? Yes   Assessment information obtained from? Patient   Prior to hospitilization cognitive status: Alert/Oriented   Prior to hospitalization functional status: Independent   Current cognitive status: Alert/Oriented   Current Functional Status: Independent   Lives With parent(s);significant other   Able to Return to Prior Arrangements yes   Is patient able to care for self after discharge? Yes   Who are your caregiver(s) and their phone number(s)? Mother Radha   Patient's perception of discharge disposition home or selfcare   Readmission Within the Last 30 Days no previous admission in last 30 days   Equipment Currently Used at Home none   Do you have any problems affording any of your prescribed medications? No   Is the patient taking medications as prescribed? yes   Does the patient have transportation home? Yes   Transportation Anticipated car, drives self;family or friend will provide   Discharge Plan A Home   Discharge Plan B Home   Patient/Family in Agreement with Plan yes       [facility] [employer] [address] [place] [address] [place] [address] AT [address]  [place] [address]  Phone: [phone] Fax: [phone]    [facility] - [place] [facility] - [address]  [address]  [place] [address]  Phone: [phone] Fax: [phone]

## 2018-12-14 NOTE — H&P
[facility] General [facility] History& Physical Report  12/13/2018 8:59 PM     Crystal Tsai  [id number]    Consultant: ER    CC/Reason for Consultation: perirectal abscess    HPI:  28 y.o. female w/PMH significant for interstitial cystitis/recurrent UTIs who presented to the ER after acute onset of rectal pain during vaginal intercourse tonight w/associated mucous/bloody discharge from both her vagina/rectum. She denies ever having similar symptoms in the past, states her last BM was this AM w/o blood. Currently, c/o positional suprapubic/lower back/sacral pain found to have presacral/intramural rectal fluid collection w/concern for possible abscess as well as inflammatory/bowel wall thickening on CT A/P. Currently on OCPs, LMP ~1mo. Ago, denies h/o endometriosis; reports a family history of UC in her brother and colon cancer in her grandfather.    Past Medical History:   Diagnosis Date    Interstitial cystitis     Kidney stone     Migraine     UTI (urinary tract infection)        Past Surgical History:   Procedure Laterality Date    ABDOMINAL SURGERY      [place] EXPLORATORY OF ABDOMEN         Social History     Socioeconomic History    Marital status: Single     Spouse name: Not on file    Number of children: Not on file    Years of education: Not on file    Highest education level: Not on file   Social Needs    Financial resource strain: Not on file    Food insecurity - worry: Not on file    Food insecurity - inability: Not on file    Transportation needs - medical: Not on file    Transportation needs - non-medical: Not on file   Occupational History    Not on file   Tobacco Use    Smoking status: Current Every Day Smoker     Packs/day: 0.50    Smokeless tobacco: Never Used   Substance and Sexual Activity    Alcohol use: Yes     Alcohol/week: 0.0 oz     Comment: occassionally    Drug use: Yes     Types: Marijuana    Sexual activity: Yes     Partners: Male     Birth control/protection: None    Other Topics Concern    Not on file   Social History Narrative    She has a high school diploma. Patient is currently not working. She has been together with the same person for over 3 years. He works as a .                Family History   Problem Relation Age of Onset    Cancer Mother     Diabetes Father        Review of patient's allergies indicates:  No Known Allergies    ROS - Negative except as per noted in HPI above    Objective    Vitals:    12/13/18 1858   BP: 130/78   Pulse: 67   Resp: 16   Temp: 98.4 °F (36.9 °C)     Physical exam:  GEN: Awake, alert, uncomfortable appearing, crying 2/2 pain   HEENT: NCAT, EOMI  RESP: Normal WOB, no distress  CV: RRR, normal S1/S2  ABD: Soft, minimal suprapubic TTP, non-distended, no guarding/rebound  : external anorectal exam w/o discharge, induration, erythema in gluteal region, no external hemorrhoids noted  EXT: WWP, no edema  SKIN: warm, dry  NEURO: alert, normal speech  PSYCH: normal mood and affect    Imaging: CT A/P reviewed    A/P: 28 y.o. female with above history and findings p/w dyspareunia and  bleeding found to have inflammation of sigmoid w/intramural ?rectal/presacral abscess on CT A/P.    -Admit to Surgery for observation  -NPO, mIVFs  -Zosyn abx  -Pain control/antiemetric regimen PRN  -Bowel regimen PRN  -DVT PPx: SCDs    Plan discussed w/staff attending Dr. Crawford.    Karly Padilla MD   General Surgery PGY3

## 2018-12-14 NOTE — HPI
28 y.o. female w/PMH significant for interstitial cystitis/recurrent UTIs who presented to the ER after acute onset of rectal pain during vaginal intercourse tonight w/associated mucous/bloody discharge from both her vagina/rectum. She denies ever having similar symptoms in the past, states her last BM was this AM w/o blood. Currently, c/o positional suprapubic/lower back/sacral pain found to have presacral/intramural rectal fluid collection w/concern for possible abscess as well as inflammatory/bowel wall thickening on CT A/P. Currently on OCPs, LMP ~1mo. Ago, denies h/o endometriosis; reports a family history of UC in her brother and colon cancer in her grandfather.

## 2018-12-14 NOTE — ASSESSMENT & PLAN NOTE
Intramural Sigmoid/rectal abscess.  Unclear etiology, family history of UC  Continue IV abx  GI consulted for evaluation for possible flex sig  Will keep NPO until GI has evaluated  Pain control  OOB/Ambulate

## 2018-12-14 NOTE — CONSULTS
Reason for Consult:  Abnormal Imaging, Mariza-rectal Abscess    HPI:  Pt is a 28 y.o. female who presents to hospital with rectal pain x 1-2 days.  Sx's preceded by sexual intercourse, severe, sharp, radiating to her lower back.  + associated prior Hx of interstitial cystitis.  + associated mucousy/bloody discharge from rectum, that has now alleviated on own.  Pain continues.  No prior hx of IBD.  + associated lower abd. Pain, sharp, exacerbated by movement.  No N/V, F/C, wt. Loss.  No dysphagia, GERD sx's, yellowing of eyes/skin.  No diarrhea/constipation prior to symptoms.  No black/tarry stools.      No prior endoscopic history.    Past Medical History:   Diagnosis Date    Interstitial cystitis     Kidney stone     Migraine     UTI (urinary tract infection)        Past Surgical History:   Procedure Laterality Date    ABDOMINAL SURGERY      [place] EXPLORATORY OF ABDOMEN         Family History   Problem Relation Age of Onset    Cancer Mother     Diabetes Father        Social History     Socioeconomic History    Marital status: Single     Spouse name: Not on file    Number of children: Not on file    Years of education: Not on file    Highest education level: Not on file   Social Needs    Financial resource strain: Not on file    Food insecurity - worry: Not on file    Food insecurity - inability: Not on file    Transportation needs - medical: Not on file    Transportation needs - non-medical: Not on file   Occupational History    Not on file   Tobacco Use    Smoking status: Current Every Day Smoker     Packs/day: 0.50    Smokeless tobacco: Never Used   Substance and Sexual Activity    Alcohol use: Yes     Alcohol/week: 0.0 oz     Comment: occassionally    Drug use: Yes     Types: Marijuana    Sexual activity: Yes     Partners: Male     Birth control/protection: None   Other Topics Concern    Not on file   Social History Narrative    She has a high school diploma. Patient is currently not working.  She has been together with the same person for over 3 years. He works as a .                Endoscopic History:  None    Review of patient's allergies indicates:  No Known Allergies    No current facility-administered medications on file prior to encounter.      Current Outpatient Medications on File Prior to Encounter   Medication Sig Dispense Refill    gabapentin (NEURONTIN) 300 MG capsule Take 2 capsules (600 mg total) by mouth 3 (three) times daily. 180 capsule 11    dicyclomine (BENTYL) 20 mg tablet Take 1 tablet (20 mg total) by mouth 3 (three) times daily as needed. 30 tablet 0    phentermine (ADIPEX-P) 37.5 mg tablet Take 18.75 mg by mouth 2 (two) times daily.  0    VYFEMLA, 28, 0.4-35 mg-mcg per tablet TAKE 1 TABLET BY MOUTH EVERY DAY 28 tablet 6     Scheduled Meds:   acetaminophen  650 mg Oral Q6H    gabapentin  600 mg Oral TID    nicotine  1 patch Transdermal Daily    piperacillin-tazobactam (ZOSYN) IVPB  4.5 g Intravenous Q8H    scopolamine  1 patch Transdermal Q3 Days    senna-docusate 8.6-50 mg  1 tablet Oral BID     Continuous Infusions:   dextrose 5 % and 0.45 % NaCl 100 mL/hr at 12/14/18 1037     PRN Meds:.acetaminophen, bisacodyl, diphenhydrAMINE, HYDROmorphone, ondansetron, promethazine (PHENERGAN) IVPB    Review of Systems:  CONSTITUTIONAL: Negative for weakness, fever, weight loss, weight gain.  HEENT: Eyes: Negative for double/blurred vision. Ears: Negative pain or loss of hearing. Nose:Negative for nasal congestion. Negative for rhinorrhea Mouth: Negative for dry mouth/pain Throat: Negative for masses or hoarseness.  CARDIOVASCULAR: Negative for chest pain or palpitations.  RESPIRATORY: Negative for SOB, wheezes  GASTROINTESTINAL: See HPI  GENITOURINARY: Negative for dysuria/hematuria.  MUSCULOSKELETAL: Negative for osteoarthritis, muscle pain.  SKIN: Negative for rashes/lesions.  NEUROLOGIC: Negative for headaches, numbness/tingling.  ENDOCRINE: Negative for diabetes or  "thyroid abnormalities.  HEMATOLOGIC: Negative for anemia or blood dyscrasias.    Patient Vitals for the past 24 hrs:   BP Temp Temp src Pulse Resp SpO2 Height Weight   12/14/18 0829 97/71 97.9 °F (36.6 °C) Oral 60 18 100 % -- --   12/14/18 0814 -- -- -- -- -- 99 % -- --   12/14/18 0405 105/64 98 °F (36.7 °C) Oral 69 18 100 % -- --   12/14/18 0046 115/74 97.6 °F (36.4 °C) Oral 61 18 99 % -- --   12/13/18 2138 139/79 97.9 °F (36.6 °C) Oral 70 17 98 % 5' 5" (1.651 m) 74.7 kg (164 lb 11.7 oz)   12/13/18 1858 130/78 98.4 °F (36.9 °C) Oral 67 16 97 % -- --   12/13/18 1654 117/74 98.6 °F (37 °C) Oral 70 18 100 % 5' 5" (1.651 m) 74.8 kg (165 lb)       Gen: Well developed, well nourished, no apparent distress, cooperative  HEENT: Anicteric, PERRLA, normocephalic, neck symmetrical  CV: S1, S2, no murmers/rubs, non-displaced PMI  Lungs: CTA-B, normal excursion  Abd: Soft, + TTP, across lower abdomen, no rebound/guarding, ND, normal BS's, no HSM  Ext: No c/c/e, 1+ DP pulses to BLE's  Neuro: CN II-XII grossly intact, no asterixis.  Skin: No rashes/lesions, normal texture  Psych: AA&O x 4, normal affect    Labs:  Recent Labs   Lab 12/13/18  1730 12/14/18  0457   CALCIUM 9.3 8.3*   PROT 7.7  --     137   K 3.9 3.8   CO2 24 27    106   BUN 13 11   CREATININE 0.8 0.8   ALKPHOS 35*  --    ALT 23  --    AST 18  --    BILITOT 0.2  --      Recent Results (from the past 336 hour(s))   CBC auto differential    Collection Time: 12/14/18  4:57 AM   Result Value Ref Range    WBC 7.28 3.90 - 12.70 K/uL    Hemoglobin 11.4 (L) 12.0 - 16.0 g/dL    Hematocrit 34.1 (L) 37.0 - 48.5 %    Platelets 208 150 - 350 K/uL   CBC auto differential    Collection Time: 12/13/18  5:30 PM   Result Value Ref Range    WBC 8.90 3.90 - 12.70 K/uL    Hemoglobin 12.9 12.0 - 16.0 g/dL    Hematocrit 39.0 37.0 - 48.5 %    Platelets 247 150 - 350 K/uL     Recent Labs   Lab 12/13/18  1922   INR 1.0   APTT 24.8       Imaging:  CT:  Wall thickening and abnormal " mural enhancement involving the distal sigmoid colon and the rectum with thickening of the perirectal fascia.  The findings represent nonspecific proctocolitis.  Gastroenterology follow-up is suggested.  Endoscopic correlation is recommended after the patient's acute symptoms resolve.    1.6 cm ill-defined collection which appears to be adjacent to the wall of the rectum asymmetric to the left.  The findings may represent evolving intramural abscess.  Short-term follow-up and preferably with MRI of the pelvis without and with contrast may be attempted for further evaluation.    Assessment:  Pt. Is a 28 y.o. female with:  1. Abnormal Imaging, Thickening of Sigmoid/Rectum - Unclear etiology, history not entirely consistent with IBD/Proctitis.    2. Pelvic/Rectal Pain - secondary to #1.  3. Hx of Interstitial Cystitis, UTI, Kidney Stones....    Recommendations:  1. Monitor abd.Exam.  2. Surgical evaluation ongoing.  3. Abx, OK for bland diet.  4. Will need full C-scope to r/o IBD, in near future, preferably after acute issues have improved.    5. Consider MRI of Pelvis.        I would like to take this opportunity to thank you for this consult.  If you have any questions or concerns, please do not hesitate to contact me.

## 2018-12-14 NOTE — SUBJECTIVE & OBJECTIVE
Interval History: continues to complain of uncontrolled pain and need to have a BM but not able to 2/2 pain.  No N/V.  She would like PO    Denies fever/chills/night sweats      Medications:  Continuous Infusions:   dextrose 5 % and 0.45 % NaCl 100 mL/hr at 12/14/18 1037     Scheduled Meds:   gabapentin  600 mg Oral TID    nicotine  1 patch Transdermal Daily    piperacillin-tazobactam (ZOSYN) IVPB  4.5 g Intravenous Q8H    scopolamine  1 patch Transdermal Q3 Days    senna-docusate 8.6-50 mg  1 tablet Oral BID     PRN Meds:acetaminophen, bisacodyl, HYDROmorphone, ondansetron, promethazine (PHENERGAN) IVPB     Review of patient's allergies indicates:  No Known Allergies  Objective:     Vital Signs (Most Recent):  Temp: 97.9 °F (36.6 °C) (12/14/18 0829)  Pulse: 60 (12/14/18 0829)  Resp: 18 (12/14/18 0829)  BP: 97/71 (12/14/18 0829)  SpO2: 100 % (12/14/18 0829) Vital Signs (24h Range):  Temp:  [97.6 °F (36.4 °C)-98.6 °F (37 °C)] 97.9 °F (36.6 °C)  Pulse:  [60-70] 60  Resp:  [16-18] 18  SpO2:  [97 %-100 %] 100 %  BP: ()/(64-79) 97/71     Weight: 74.7 kg (164 lb 11.7 oz)  Body mass index is 27.41 kg/m².    Intake/Output - Last 3 Shifts       12/12 0700 - 12/13 0659 12/13 0700 - 12/14 0659 12/14 0700 - 12/15 0659    P.O.  0     I.V. (mL/kg)   1102.1 (14.8)    IV Piggyback   250    Total Intake(mL/kg)  0 (0) 1352.1 (18.1)    Urine (mL/kg/hr)  100 600 (1.1)    Total Output  100 600    Net  -100 +752.1                 Physical Exam   Constitutional: She is oriented to person, place, and time. She appears well-developed and well-nourished.   HENT:   Head: Normocephalic and atraumatic.   Eyes: Conjunctivae and EOM are normal.   Cardiovascular: Normal rate and regular rhythm.   Pulmonary/Chest: Effort normal and breath sounds normal.   Abdominal: Soft. Bowel sounds are normal.   Genitourinary:   Genitourinary Comments: Perineum w/ no erythema or induration, non tender to palpation, no perianal lesions or tenderness,  no drainage   Musculoskeletal: Normal range of motion.   Neurological: She is alert and oriented to person, place, and time.   Skin: Skin is warm and dry.       Significant Labs:  CBC:   Recent Labs   Lab 12/14/18  0457   WBC 7.28   RBC 3.70*   HGB 11.4*   HCT 34.1*      MCV 92   MCH 30.8   MCHC 33.4     BMP:   Recent Labs   Lab 12/14/18  0457   GLU 86      K 3.8      CO2 27   BUN 11   CREATININE 0.8   CALCIUM 8.3*       Significant Diagnostics:  no new imaging since admission

## 2018-12-14 NOTE — PROGRESS NOTES
[facility] [place]  General Surgery  Progress Note    Subjective:     History of Present Illness:  28 y.o. female w/PMH significant for interstitial cystitis/recurrent UTIs who presented to the ER after acute onset of rectal pain during vaginal intercourse tonight w/associated mucous/bloody discharge from both her vagina/rectum. She denies ever having similar symptoms in the past, states her last BM was this AM w/o blood. Currently, c/o positional suprapubic/lower back/sacral pain found to have presacral/intramural rectal fluid collection w/concern for possible abscess as well as inflammatory/bowel wall thickening on CT A/P. Currently on OCPs, LMP ~1mo. Ago, denies h/o endometriosis; reports a family history of UC in her brother and colon cancer in her grandfather.    Post-Op Info:  * No surgery found *         Interval History: continues to complain of uncontrolled pain and need to have a BM but not able to 2/2 pain.  No N/V.  She would like PO    Denies fever/chills/night sweats      Medications:  Continuous Infusions:   dextrose 5 % and 0.45 % NaCl 100 mL/hr at 12/14/18 1037     Scheduled Meds:   gabapentin  600 mg Oral TID    nicotine  1 patch Transdermal Daily    piperacillin-tazobactam (ZOSYN) IVPB  4.5 g Intravenous Q8H    scopolamine  1 patch Transdermal Q3 Days    senna-docusate 8.6-50 mg  1 tablet Oral BID     PRN Meds:acetaminophen, bisacodyl, HYDROmorphone, ondansetron, promethazine (PHENERGAN) IVPB     Review of patient's allergies indicates:  No Known Allergies  Objective:     Vital Signs (Most Recent):  Temp: 97.9 °F (36.6 °C) (12/14/18 0829)  Pulse: 60 (12/14/18 0829)  Resp: 18 (12/14/18 0829)  BP: 97/71 (12/14/18 0829)  SpO2: 100 % (12/14/18 0829) Vital Signs (24h Range):  Temp:  [97.6 °F (36.4 °C)-98.6 °F (37 °C)] 97.9 °F (36.6 °C)  Pulse:  [60-70] 60  Resp:  [16-18] 18  SpO2:  [97 %-100 %] 100 %  BP: ()/(64-79) 97/71     Weight: 74.7 kg (164 lb 11.7 oz)  Body mass index is  27.41 kg/m².    Intake/Output - Last 3 Shifts       12/12 0700 - 12/13 0659 12/13 0700 - 12/14 0659 12/14 0700 - 12/15 0659    P.O.  0     I.V. (mL/kg)   1102.1 (14.8)    IV Piggyback   250    Total Intake(mL/kg)  0 (0) 1352.1 (18.1)    Urine (mL/kg/hr)  100 600 (1.1)    Total Output  100 600    Net  -100 +752.1                 Physical Exam   Constitutional: She is oriented to person, place, and time. She appears well-developed and well-nourished.   HENT:   Head: Normocephalic and atraumatic.   Eyes: Conjunctivae and EOM are normal.   Cardiovascular: Normal rate and regular rhythm.   Pulmonary/Chest: Effort normal and breath sounds normal.   Abdominal: Soft. Bowel sounds are normal.   Genitourinary:   Genitourinary Comments: Perineum w/ no erythema or induration, non tender to palpation, no perianal lesions or tenderness, no drainage   Musculoskeletal: Normal range of motion.   Neurological: She is alert and oriented to person, place, and time.   Skin: Skin is warm and dry.       Significant Labs:  CBC:   Recent Labs   Lab 12/14/18  0457   WBC 7.28   RBC 3.70*   HGB 11.4*   HCT 34.1*      MCV 92   MCH 30.8   MCHC 33.4     BMP:   Recent Labs   Lab 12/14/18  0457   GLU 86      K 3.8      CO2 27   BUN 11   CREATININE 0.8   CALCIUM 8.3*       Significant Diagnostics:  no new imaging since admission    Assessment/Plan:     Perirectal abscess    Intramural Sigmoid/rectal abscess.  Unclear etiology, family history of UC  Continue IV abx  GI consulted for evaluation for possible flex sig  Will keep NPO until GI has evaluated  Pain control  OOB/Ambulate         Alba Malik MD  General Surgery  [facility]

## 2018-12-14 NOTE — PROGRESS NOTES
WRITTEN HEALTHCARE DISCHARGE INFORMATION     Things that YOU are RESPONSIBLE for to Manage Your Care At Home:    1. Getting your prescriptions filled.  2. Taking you medications as directed. DO NOT MISS ANY DOSES!  3. Going to your follow-up doctor appointments. This is important because it allows the doctor to monitor your progress and to determine if any changes need to be made to your treatment plan.    If you are unable to make your follow up appointments, please call the number listed and reschedule this appointment.     ____________HELP AT HOME____________________    Experiencing any SIGNS or SYMPTOMS: YOU CAN    Schedule a same day appopintment with your Primary Care Doctor or  you can call [facility] On Call [facility] Care Line for 24/7 assistance at [phone]    If you are experience any signs or symptoms that have become severe, Call 911 and come to your nearest Emergency Room.    Thank you for choosing [facility] and allowing us to care for you.   From your care management team: STEFANO Wayne, [facility] [place] [phone]     You should receive a call from [facility] within 48-72 hours to help manage your care after discharge. Please try to make sure that you answer your phone for this important phone call.     Follow-up Information     Jayme Romano NP On 12/17/2018.    Specialty:  Internal Medicine  Why:  Monday at 9:15am,  Hospital Follow up appointment  Contact information:  [address] [place] [address]  [phone]

## 2018-12-14 NOTE — PLAN OF CARE
Problem: Adult Inpatient Plan of Care  Goal: Plan of Care Review  Outcome: Ongoing (interventions implemented as appropriate)  No falls, trauma or injury this shift. Skin remains intact. Pain managed with IV Dilaudid every 4 hours and nausea managed width zofran and phenergan. cntinue to monitor patient and continue to monitor patient.

## 2018-12-15 LAB
ANION GAP SERPL CALC-SCNC: 5 MMOL/L
BASOPHILS # BLD AUTO: 0.01 K/UL
BASOPHILS NFR BLD: 0.2 %
BUN SERPL-MCNC: 12 MG/DL
CALCIUM SERPL-MCNC: 8.2 MG/DL
CHLORIDE SERPL-SCNC: 107 MMOL/L
CO2 SERPL-SCNC: 25 MMOL/L
CREAT SERPL-MCNC: 0.8 MG/DL
DIFFERENTIAL METHOD: ABNORMAL
EOSINOPHIL # BLD AUTO: 0.1 K/UL
EOSINOPHIL NFR BLD: 1.4 %
ERYTHROCYTE [DISTWIDTH] IN BLOOD BY AUTOMATED COUNT: 13.1 %
EST. GFR  (AFRICAN AMERICAN): >60 ML/MIN/1.73 M^2
EST. GFR  (NON AFRICAN AMERICAN): >60 ML/MIN/1.73 M^2
GLUCOSE SERPL-MCNC: 99 MG/DL
HCT VFR BLD AUTO: 32.6 %
HGB BLD-MCNC: 10.8 G/DL
LYMPHOCYTES # BLD AUTO: 2.8 K/UL
LYMPHOCYTES NFR BLD: 48.9 %
MCH RBC QN AUTO: 30.7 PG
MCHC RBC AUTO-ENTMCNC: 33.1 G/DL
MCV RBC AUTO: 93 FL
MONOCYTES # BLD AUTO: 0.3 K/UL
MONOCYTES NFR BLD: 5.3 %
NEUTROPHILS # BLD AUTO: 2.5 K/UL
NEUTROPHILS NFR BLD: 44.2 %
PLATELET # BLD AUTO: 193 K/UL
PMV BLD AUTO: 9.8 FL
POTASSIUM SERPL-SCNC: 4.1 MMOL/L
RBC # BLD AUTO: 3.52 M/UL
SODIUM SERPL-SCNC: 137 MMOL/L
WBC # BLD AUTO: 5.66 K/UL

## 2018-12-15 PROCEDURE — 36415 COLL VENOUS BLD VENIPUNCTURE: CPT

## 2018-12-15 PROCEDURE — 25000003 PHARM REV CODE 250: Performed by: STUDENT IN AN ORGANIZED HEALTH CARE EDUCATION/TRAINING PROGRAM

## 2018-12-15 PROCEDURE — G0378 HOSPITAL OBSERVATION PER HR: HCPCS

## 2018-12-15 PROCEDURE — 80048 BASIC METABOLIC PNL TOTAL CA: CPT

## 2018-12-15 PROCEDURE — 11000001 HC ACUTE MED/SURG PRIVATE ROOM

## 2018-12-15 PROCEDURE — 25000003 PHARM REV CODE 250: Performed by: SURGERY

## 2018-12-15 PROCEDURE — S4991 NICOTINE PATCH NONLEGEND: HCPCS | Performed by: EMERGENCY MEDICINE

## 2018-12-15 PROCEDURE — 63600175 PHARM REV CODE 636 W HCPCS: Performed by: NURSE PRACTITIONER

## 2018-12-15 PROCEDURE — 51798 US URINE CAPACITY MEASURE: CPT

## 2018-12-15 PROCEDURE — 25000003 PHARM REV CODE 250: Performed by: INTERNAL MEDICINE

## 2018-12-15 PROCEDURE — 25000003 PHARM REV CODE 250: Performed by: EMERGENCY MEDICINE

## 2018-12-15 PROCEDURE — 85025 COMPLETE CBC W/AUTO DIFF WBC: CPT

## 2018-12-15 PROCEDURE — 63600175 PHARM REV CODE 636 W HCPCS: Performed by: STUDENT IN AN ORGANIZED HEALTH CARE EDUCATION/TRAINING PROGRAM

## 2018-12-15 PROCEDURE — 63600175 PHARM REV CODE 636 W HCPCS: Performed by: SURGERY

## 2018-12-15 PROCEDURE — 25000003 PHARM REV CODE 250: Performed by: NURSE PRACTITIONER

## 2018-12-15 RX ORDER — HYDROMORPHONE HYDROCHLORIDE 2 MG/ML
0.5 INJECTION, SOLUTION INTRAMUSCULAR; INTRAVENOUS; SUBCUTANEOUS EVERY 4 HOURS PRN
Status: DISCONTINUED | OUTPATIENT
Start: 2018-12-15 | End: 2018-12-15

## 2018-12-15 RX ORDER — POLYETHYLENE GLYCOL 3350 17 G/17G
17 POWDER, FOR SOLUTION ORAL 2 TIMES DAILY
Status: DISCONTINUED | OUTPATIENT
Start: 2018-12-15 | End: 2018-12-17 | Stop reason: HOSPADM

## 2018-12-15 RX ORDER — HYDROMORPHONE HYDROCHLORIDE 2 MG/ML
1 INJECTION, SOLUTION INTRAMUSCULAR; INTRAVENOUS; SUBCUTANEOUS EVERY 4 HOURS PRN
Status: DISCONTINUED | OUTPATIENT
Start: 2018-12-15 | End: 2018-12-16

## 2018-12-15 RX ORDER — OXYCODONE HYDROCHLORIDE 5 MG/1
10 TABLET ORAL EVERY 4 HOURS PRN
Status: DISCONTINUED | OUTPATIENT
Start: 2018-12-15 | End: 2018-12-17

## 2018-12-15 RX ADMIN — HYDROMORPHONE HYDROCHLORIDE 1 MG: 2 INJECTION INTRAMUSCULAR; INTRAVENOUS; SUBCUTANEOUS at 04:12

## 2018-12-15 RX ADMIN — PIPERACILLIN AND TAZOBACTAM 4.5 G: 4; .5 INJECTION, POWDER, LYOPHILIZED, FOR SOLUTION INTRAVENOUS; PARENTERAL at 08:12

## 2018-12-15 RX ADMIN — OXYCODONE HYDROCHLORIDE 5 MG: 5 TABLET ORAL at 01:12

## 2018-12-15 RX ADMIN — HYDROMORPHONE HYDROCHLORIDE 1 MG: 2 INJECTION INTRAMUSCULAR; INTRAVENOUS; SUBCUTANEOUS at 02:12

## 2018-12-15 RX ADMIN — ACETAMINOPHEN 650 MG: 325 TABLET ORAL at 05:12

## 2018-12-15 RX ADMIN — PIPERACILLIN AND TAZOBACTAM 4.5 G: 4; .5 INJECTION, POWDER, LYOPHILIZED, FOR SOLUTION INTRAVENOUS; PARENTERAL at 12:12

## 2018-12-15 RX ADMIN — OXYCODONE HYDROCHLORIDE 10 MG: 5 TABLET ORAL at 05:12

## 2018-12-15 RX ADMIN — GABAPENTIN 600 MG: 300 CAPSULE ORAL at 08:12

## 2018-12-15 RX ADMIN — GABAPENTIN 600 MG: 300 CAPSULE ORAL at 03:12

## 2018-12-15 RX ADMIN — ACETAMINOPHEN 650 MG: 325 TABLET ORAL at 12:12

## 2018-12-15 RX ADMIN — ONDANSETRON 4 MG: 2 INJECTION INTRAMUSCULAR; INTRAVENOUS at 03:12

## 2018-12-15 RX ADMIN — NICOTINE 1 PATCH: 7 PATCH, EXTENDED RELEASE TRANSDERMAL at 08:12

## 2018-12-15 RX ADMIN — HYDROMORPHONE HYDROCHLORIDE 1 MG: 2 INJECTION INTRAMUSCULAR; INTRAVENOUS; SUBCUTANEOUS at 10:12

## 2018-12-15 RX ADMIN — POLYETHYLENE GLYCOL 3350 17 G: 17 POWDER, FOR SOLUTION ORAL at 08:12

## 2018-12-15 RX ADMIN — GABAPENTIN 600 MG: 300 CAPSULE ORAL at 10:12

## 2018-12-15 RX ADMIN — PIPERACILLIN AND TAZOBACTAM 4.5 G: 4; .5 INJECTION, POWDER, LYOPHILIZED, FOR SOLUTION INTRAVENOUS; PARENTERAL at 04:12

## 2018-12-15 RX ADMIN — OXYCODONE HYDROCHLORIDE 10 MG: 5 TABLET ORAL at 12:12

## 2018-12-15 RX ADMIN — ACETAMINOPHEN 650 MG: 325 TABLET ORAL at 11:12

## 2018-12-15 RX ADMIN — OXYCODONE HYDROCHLORIDE 10 MG: 5 TABLET ORAL at 06:12

## 2018-12-15 RX ADMIN — POLYETHYLENE GLYCOL 3350 17 G: 17 POWDER, FOR SOLUTION ORAL at 10:12

## 2018-12-15 RX ADMIN — LIDOCAINE 1 PATCH: 50 PATCH TOPICAL at 10:12

## 2018-12-15 RX ADMIN — ONDANSETRON 4 MG: 2 INJECTION INTRAMUSCULAR; INTRAVENOUS at 10:12

## 2018-12-15 RX ADMIN — HYDROMORPHONE HYDROCHLORIDE 0.5 MG: 2 INJECTION, SOLUTION INTRAMUSCULAR; INTRAVENOUS; SUBCUTANEOUS at 09:12

## 2018-12-15 RX ADMIN — ONDANSETRON 4 MG: 2 INJECTION INTRAMUSCULAR; INTRAVENOUS at 04:12

## 2018-12-15 NOTE — PROGRESS NOTES
[facility] [place]  General Surgery  Progress Note     Subjective:      Interval History: NAOE, reports persistent suprapubic/sacral pain, difficulty urinating w/small blood from vaginal area, denies BM.     Objective:   Vital Signs:        Vitals:    12/15/18 0738   BP: 121/70   Pulse: 71   Resp: 18   Temp: 97.1 °F (36.2 °C)       Intake/Output Summary (Last 24 hours):         Intake/Output Summary (Last 24 hours) at 12/15/2018 0946  Last data filed at 12/15/2018 0500  Gross per 24 hour   Intake 120 ml   Output 300 ml   Net -180 ml           Physical Exam:  General: NAD, AAOx3, anxious  HEENT: NCAT, EOMI  CV: RRR, normal S1/S2  Resp: aerating well, symmetric expansion, no distress  Abdomen: soft, +TTP suprapubic, non-distended, no guarding/rebound  : no murillo  Extremities: moves all, no cyanosis    Significant Labs:  Reviewed  Recent Labs   Lab 12/15/18  0638   WBC 5.66   RBC 3.52*   HGB 10.8*   HCT 32.6*      MCV 93   MCH 30.7   MCHC 33.1        Assessment/Plan:     H/o interstitial cystisis, admitted for rectosigmoid inflammation/presacral ?abscess, c/f IBD.    -If persistent sxs, possibly flex sig in OR Monday, 12/17, will reassess  -GI consulted, appreciate recs: outpatient C-scope once active process resolves  -Will consult inpatient Urology given h/o interstitial cystitis  -Continue regular diet  -Continue pain control/antiemetic regimen PRN  -Continue IV abx  -DVT PPx: SCDs    Plan discussed w/staff attending, Dr. Santana.      Karly Padilla MD  General Surgery, PGY3

## 2018-12-15 NOTE — NURSING
Alyssa' bedside report on pt and assumed care. Pt AAO x4, stated pain 10 on a scale of 0-10, not due for pain med until late morning, will try other comforting measures to help with pain until then. Will continue to monitor.

## 2018-12-15 NOTE — PROGRESS NOTES
"Gastroenterology    CC: Abdominal pain     HPI 28 y.o. female with persistent pain.  No N/V.  Constipated.       Past Medical History:   Diagnosis Date    Interstitial cystitis     Kidney stone     Migraine     UTI (urinary tract infection)          Review of Systems  General ROS: negative for chills, fever   Cardiovascular ROS: no chest pain or dyspnea     Physical Examination  BP (!) 99/58 (BP Location: Right arm, Patient Position: Lying)   Pulse 71   Temp 98.6 °F (37 °C) (Oral)   Resp 18   Ht 5' 5" (1.651 m)   Wt 74.7 kg (164 lb 11.7 oz)   LMP 11/15/2018 (Within Days)   SpO2 98%   Breastfeeding? No   BMI 27.41 kg/m²   General appearance: alert, cooperative, no distress  HENT: Normocephalic, atraumatic, neck symmetrical, no nasal discharge   Eyes: conjunctivae/corneas clear, no icterus, EOM's intact  Lungs: resp non-labored  Heart: regular rate   Abdomen: soft, ND, TTP lower abd  Extremities: extremities symmetric; no clubbing, cyanosis, or edema  Neurologic: Alert and oriented X 3, MAEW    Labs:  WBC nl  CRP nl    Assessment:     Pt. Is a 28 y.o. female with:  1. Abnormal Imaging, Thickening of Sigmoid/Rectum - Unclear etiology, history not entirely consistent with IBD/Proctitis.  Normal CRP makes active Crohns unlikely.    2. Pelvic/Rectal Pain - secondary to #1.  3. Hx of Interstitial Cystitis, UTI, Kidney Stones    Plan:   1. Monitor abd.Exam.  2. Surgical evaluation ongoing.  3. Abx, OK for bland diet.  4. Will need full C-scope to r/o IBD, in near future, preferably after acute issues have improved.    5. Would repeat CT in next 24-48 hrs.  If no better, consider perc drainage if accessible.          "

## 2018-12-15 NOTE — NURSING
Patient out of shower, IV access not patient, site removed. Called House Sup to restart with use of ultrasound. New start to right ac.

## 2018-12-15 NOTE — PLAN OF CARE
Problem: Adult Inpatient Plan of Care  Goal: Plan of Care Review  Outcome: Ongoing (interventions implemented as appropriate)  Pt AAO x 4, pt pain is not managed even with oxycodone Q4h, Dilaudid Q4h and nausea is managed with Phenergan and Zofran. Infection is managed with IV Zosyn. No trauma, injury or falls this shift. Pt afebrile and is able to make all needs known. Will continue to monitor.

## 2018-12-15 NOTE — NURSING
Notified Dr Jc of patient has problem with urinary retention. New orders received . Bladder scan showed greater then 343. In and out catheterization with  return of 500 cc urine

## 2018-12-15 NOTE — NURSING
Patient crying states pain medications still not helping.  called, Oxycodone changed to 10mg every 4 hours and Dilaudid reduced to 0.5mg every 4 hours for break thru pain. Patient stated she may leave AMA, I asked if she would like the AMA papers to sign but I thought that she should wait for the Doctors to make rounds. Patient agreed to stay. Still no IV access at this time.

## 2018-12-15 NOTE — PLAN OF CARE
Problem: Adult Inpatient Plan of Care  Goal: Plan of Care Review  Outcome: Ongoing (interventions implemented as appropriate)   12/15/18 0204   Plan of Care Review   Plan of Care Reviewed With patient   No falls, trauma or injury this shift. Skin remain intact. Pain not managed even tough both oral and IV medications in use. Infection managed with IV Zosyn. Continue to monitor patient and continue with plan of care..

## 2018-12-15 NOTE — NURSING
Administered morning meds, pt tolerated well. Continuing comforting measures until time admin pain meds. Will continue to monitor

## 2018-12-15 NOTE — NURSING
called, patient complaining pain medications not holding her, . Patient would like the pain medications increased. New orders heating pad, lidocaine patch 12 hours on and 12 hours off.

## 2018-12-16 PROBLEM — R33.9 INCOMPLETE BLADDER EMPTYING: Status: ACTIVE | Noted: 2018-12-16

## 2018-12-16 PROBLEM — R31.0 GROSS HEMATURIA: Status: ACTIVE | Noted: 2018-12-16

## 2018-12-16 PROBLEM — K65.1 INTRA-ABDOMINAL ABSCESS: Status: ACTIVE | Noted: 2018-12-16

## 2018-12-16 LAB
ANION GAP SERPL CALC-SCNC: 7 MMOL/L
BASOPHILS # BLD AUTO: 0.01 K/UL
BASOPHILS NFR BLD: 0.2 %
BUN SERPL-MCNC: 9 MG/DL
CALCIUM SERPL-MCNC: 8 MG/DL
CHLORIDE SERPL-SCNC: 107 MMOL/L
CO2 SERPL-SCNC: 24 MMOL/L
CREAT SERPL-MCNC: 0.9 MG/DL
DIFFERENTIAL METHOD: ABNORMAL
EOSINOPHIL # BLD AUTO: 0.1 K/UL
EOSINOPHIL NFR BLD: 2 %
ERYTHROCYTE [DISTWIDTH] IN BLOOD BY AUTOMATED COUNT: 12.8 %
EST. GFR  (AFRICAN AMERICAN): >60 ML/MIN/1.73 M^2
EST. GFR  (NON AFRICAN AMERICAN): >60 ML/MIN/1.73 M^2
GLUCOSE SERPL-MCNC: 129 MG/DL
HCT VFR BLD AUTO: 30.9 %
HGB BLD-MCNC: 10.3 G/DL
LYMPHOCYTES # BLD AUTO: 2.2 K/UL
LYMPHOCYTES NFR BLD: 48 %
MCH RBC QN AUTO: 30.8 PG
MCHC RBC AUTO-ENTMCNC: 33.3 G/DL
MCV RBC AUTO: 93 FL
MONOCYTES # BLD AUTO: 0.3 K/UL
MONOCYTES NFR BLD: 5.8 %
NEUTROPHILS # BLD AUTO: 2 K/UL
NEUTROPHILS NFR BLD: 44 %
PLATELET # BLD AUTO: 178 K/UL
PMV BLD AUTO: 10.2 FL
POTASSIUM SERPL-SCNC: 3.9 MMOL/L
RBC # BLD AUTO: 3.34 M/UL
SODIUM SERPL-SCNC: 138 MMOL/L
WBC # BLD AUTO: 4.5 K/UL

## 2018-12-16 PROCEDURE — 25000003 PHARM REV CODE 250: Performed by: SURGERY

## 2018-12-16 PROCEDURE — 36415 COLL VENOUS BLD VENIPUNCTURE: CPT

## 2018-12-16 PROCEDURE — 25000003 PHARM REV CODE 250: Performed by: STUDENT IN AN ORGANIZED HEALTH CARE EDUCATION/TRAINING PROGRAM

## 2018-12-16 PROCEDURE — 85025 COMPLETE CBC W/AUTO DIFF WBC: CPT

## 2018-12-16 PROCEDURE — 80048 BASIC METABOLIC PNL TOTAL CA: CPT

## 2018-12-16 PROCEDURE — 99203 OFFICE O/P NEW LOW 30 MIN: CPT | Mod: ,,, | Performed by: UROLOGY

## 2018-12-16 PROCEDURE — S5010 5% DEXTROSE AND 0.45% SALINE: HCPCS | Performed by: SURGERY

## 2018-12-16 PROCEDURE — 25000003 PHARM REV CODE 250: Performed by: EMERGENCY MEDICINE

## 2018-12-16 PROCEDURE — S4991 NICOTINE PATCH NONLEGEND: HCPCS | Performed by: EMERGENCY MEDICINE

## 2018-12-16 PROCEDURE — S0030 INJECTION, METRONIDAZOLE: HCPCS | Performed by: STUDENT IN AN ORGANIZED HEALTH CARE EDUCATION/TRAINING PROGRAM

## 2018-12-16 PROCEDURE — 63600175 PHARM REV CODE 636 W HCPCS: Performed by: NURSE PRACTITIONER

## 2018-12-16 PROCEDURE — 25000003 PHARM REV CODE 250: Performed by: NURSE PRACTITIONER

## 2018-12-16 PROCEDURE — 25000003 PHARM REV CODE 250: Performed by: INTERNAL MEDICINE

## 2018-12-16 PROCEDURE — 63600175 PHARM REV CODE 636 W HCPCS: Performed by: SURGERY

## 2018-12-16 PROCEDURE — 25000003 PHARM REV CODE 250: Performed by: UROLOGY

## 2018-12-16 PROCEDURE — 63600175 PHARM REV CODE 636 W HCPCS: Performed by: STUDENT IN AN ORGANIZED HEALTH CARE EDUCATION/TRAINING PROGRAM

## 2018-12-16 PROCEDURE — 11000001 HC ACUTE MED/SURG PRIVATE ROOM

## 2018-12-16 RX ORDER — CELECOXIB 100 MG/1
100 CAPSULE ORAL 2 TIMES DAILY
Status: DISCONTINUED | OUTPATIENT
Start: 2018-12-16 | End: 2018-12-16

## 2018-12-16 RX ORDER — ACETAMINOPHEN 325 MG/1
650 TABLET ORAL EVERY 6 HOURS
Status: DISCONTINUED | OUTPATIENT
Start: 2018-12-16 | End: 2018-12-17 | Stop reason: HOSPADM

## 2018-12-16 RX ORDER — TAMSULOSIN HYDROCHLORIDE 0.4 MG/1
0.4 CAPSULE ORAL DAILY
Status: DISCONTINUED | OUTPATIENT
Start: 2018-12-16 | End: 2018-12-17 | Stop reason: HOSPADM

## 2018-12-16 RX ORDER — HYDROMORPHONE HYDROCHLORIDE 2 MG/ML
0.5 INJECTION, SOLUTION INTRAMUSCULAR; INTRAVENOUS; SUBCUTANEOUS EVERY 4 HOURS PRN
Status: DISCONTINUED | OUTPATIENT
Start: 2018-12-16 | End: 2018-12-16

## 2018-12-16 RX ORDER — CELECOXIB 100 MG/1
200 CAPSULE ORAL 2 TIMES DAILY
Status: DISCONTINUED | OUTPATIENT
Start: 2018-12-16 | End: 2018-12-17 | Stop reason: HOSPADM

## 2018-12-16 RX ORDER — MORPHINE SULFATE 4 MG/ML
2 INJECTION, SOLUTION INTRAMUSCULAR; INTRAVENOUS
Status: DISCONTINUED | OUTPATIENT
Start: 2018-12-16 | End: 2018-12-17

## 2018-12-16 RX ORDER — METRONIDAZOLE 500 MG/100ML
500 INJECTION, SOLUTION INTRAVENOUS
Status: DISCONTINUED | OUTPATIENT
Start: 2018-12-16 | End: 2018-12-17

## 2018-12-16 RX ORDER — DEXTROSE MONOHYDRATE, SODIUM CHLORIDE, AND POTASSIUM CHLORIDE 50; 1.49; 2.25 G/1000ML; G/1000ML; G/1000ML
INJECTION, SOLUTION INTRAVENOUS CONTINUOUS
Status: DISCONTINUED | OUTPATIENT
Start: 2018-12-17 | End: 2018-12-17

## 2018-12-16 RX ORDER — HYDROMORPHONE HYDROCHLORIDE 2 MG/ML
1 INJECTION, SOLUTION INTRAMUSCULAR; INTRAVENOUS; SUBCUTANEOUS EVERY 4 HOURS PRN
Status: DISCONTINUED | OUTPATIENT
Start: 2018-12-16 | End: 2018-12-16

## 2018-12-16 RX ADMIN — CELECOXIB 200 MG: 100 CAPSULE ORAL at 08:12

## 2018-12-16 RX ADMIN — OXYCODONE HYDROCHLORIDE 10 MG: 5 TABLET ORAL at 01:12

## 2018-12-16 RX ADMIN — NICOTINE 1 PATCH: 7 PATCH, EXTENDED RELEASE TRANSDERMAL at 09:12

## 2018-12-16 RX ADMIN — LIDOCAINE 1 PATCH: 50 PATCH TOPICAL at 08:12

## 2018-12-16 RX ADMIN — ACETAMINOPHEN 650 MG: 325 TABLET ORAL at 06:12

## 2018-12-16 RX ADMIN — PIPERACILLIN AND TAZOBACTAM 4.5 G: 4; .5 INJECTION, POWDER, LYOPHILIZED, FOR SOLUTION INTRAVENOUS; PARENTERAL at 03:12

## 2018-12-16 RX ADMIN — GABAPENTIN 600 MG: 300 CAPSULE ORAL at 08:12

## 2018-12-16 RX ADMIN — ONDANSETRON 4 MG: 2 INJECTION INTRAMUSCULAR; INTRAVENOUS at 05:12

## 2018-12-16 RX ADMIN — PIPERACILLIN AND TAZOBACTAM 4.5 G: 4; .5 INJECTION, POWDER, LYOPHILIZED, FOR SOLUTION INTRAVENOUS; PARENTERAL at 08:12

## 2018-12-16 RX ADMIN — ONDANSETRON 4 MG: 2 INJECTION INTRAMUSCULAR; INTRAVENOUS at 06:12

## 2018-12-16 RX ADMIN — MORPHINE SULFATE 2 MG: 4 INJECTION, SOLUTION INTRAMUSCULAR; INTRAVENOUS at 08:12

## 2018-12-16 RX ADMIN — METRONIDAZOLE 500 MG: 500 INJECTION, SOLUTION INTRAVENOUS at 11:12

## 2018-12-16 RX ADMIN — OXYCODONE HYDROCHLORIDE 10 MG: 5 TABLET ORAL at 06:12

## 2018-12-16 RX ADMIN — HYDROMORPHONE HYDROCHLORIDE 1 MG: 2 INJECTION, SOLUTION INTRAMUSCULAR; INTRAVENOUS; SUBCUTANEOUS at 03:12

## 2018-12-16 RX ADMIN — SODIUM PHOSPHATE, DIBASIC AND SODIUM PHOSPHATE, MONOBASIC 1 ENEMA: 7; 19 ENEMA RECTAL at 12:12

## 2018-12-16 RX ADMIN — GABAPENTIN 600 MG: 300 CAPSULE ORAL at 09:12

## 2018-12-16 RX ADMIN — HYDROMORPHONE HYDROCHLORIDE 1 MG: 2 INJECTION INTRAMUSCULAR; INTRAVENOUS; SUBCUTANEOUS at 09:12

## 2018-12-16 RX ADMIN — ACETAMINOPHEN 650 MG: 325 TABLET ORAL at 05:12

## 2018-12-16 RX ADMIN — HYDROMORPHONE HYDROCHLORIDE 1 MG: 2 INJECTION INTRAMUSCULAR; INTRAVENOUS; SUBCUTANEOUS at 04:12

## 2018-12-16 RX ADMIN — POLYETHYLENE GLYCOL 3350 17 G: 17 POWDER, FOR SOLUTION ORAL at 08:12

## 2018-12-16 RX ADMIN — MORPHINE SULFATE 2 MG: 4 INJECTION, SOLUTION INTRAMUSCULAR; INTRAVENOUS at 05:12

## 2018-12-16 RX ADMIN — MORPHINE SULFATE 2 MG: 4 INJECTION, SOLUTION INTRAMUSCULAR; INTRAVENOUS at 10:12

## 2018-12-16 RX ADMIN — DEXTROSE AND SODIUM CHLORIDE: 5; .45 INJECTION, SOLUTION INTRAVENOUS at 03:12

## 2018-12-16 RX ADMIN — DIPHENHYDRAMINE HYDROCHLORIDE 12.5 MG: 50 INJECTION, SOLUTION INTRAMUSCULAR; INTRAVENOUS at 09:12

## 2018-12-16 RX ADMIN — OXYCODONE HYDROCHLORIDE 10 MG: 5 TABLET ORAL at 12:12

## 2018-12-16 RX ADMIN — TAMSULOSIN HYDROCHLORIDE 0.4 MG: 0.4 CAPSULE ORAL at 12:12

## 2018-12-16 RX ADMIN — POLYETHYLENE GLYCOL 3350 17 G: 17 POWDER, FOR SOLUTION ORAL at 09:12

## 2018-12-16 RX ADMIN — METRONIDAZOLE 500 MG: 500 INJECTION, SOLUTION INTRAVENOUS at 07:12

## 2018-12-16 NOTE — CONSULTS
"[facility]  Urology  Consult Note    Patient Name: Crystal Tsai  MRN: [id number]  Admission Date: 12/13/2018  Hospital Length of Stay: 0   Code Status: Full Code   Attending Provider: Renzo Crawford MD   Consulting Provider: Remigio Jc MD  Primary Care Physician: Jayme Romano NP  Principal Problem:<principal problem not specified>    Consults    Subjective:     HPI:  28 female  History of "IC" and chronic pelvic pain  No Gu records  Has seen other urologist in past    Now admitted with perirectal abscess  Pt said they may have been some blood in the urine or per vagina; she is not sure    Urine is now clear    Difficulty emptying  Pt is getting dilaudid  PVRs approx 350  Pt declined cath    Difficulty emptying is a chronic problem  Her bladder is also "sensitive to touch"    Past Medical History:   Diagnosis Date    Interstitial cystitis     Kidney stone     Migraine     UTI (urinary tract infection)        Past Surgical History:   Procedure Laterality Date    ABDOMINAL SURGERY      RI EXPLORATORY OF ABDOMEN         Review of patient's allergies indicates:  No Known Allergies    Family History     Problem Relation (Age of Onset)    Cancer Mother    Diabetes Father          Tobacco Use    Smoking status: Current Every Day Smoker     Packs/day: 0.50    Smokeless tobacco: Never Used   Substance and Sexual Activity    Alcohol use: Yes     Alcohol/week: 0.0 oz     Comment: occassionally    Drug use: Yes     Types: Marijuana    Sexual activity: Yes     Partners: Male     Birth control/protection: None       Review of Systems    Objective:     Temp:  [97.9 °F (36.6 °C)-98.5 °F (36.9 °C)] 98.1 °F (36.7 °C)  Pulse:  [69-76] 71  Resp:  [16-19] 18  SpO2:  [98 %-100 %] 98 %  BP: (106-120)/(57-72) 113/72     Body mass index is 27.41 kg/m².    Date 12/16/18 0700 - 12/17/18 0659   Shift 8101-7043 6854-2886 6062-9998 24 Hour Total   INTAKE   Shift Total(mL/kg)       OUTPUT "   Urine(mL/kg/hr) 650   650   Shift Total(mL/kg) 650(8.7)   650(8.7)   Weight (kg) 74.7 74.7 74.7 74.7     Bladder Scan Volume (mL): 251 mL (12/16/18 1000)  Post Void Cath Residual (mL): 343 mL (12/15/18 0900)    Drains          None          Physical Exam    Ao*4  resp normal rate' breathing not labored  cv normal rate  Ab nondistended  Ext no edema  Bladder not distended    Significant Labs:    BMP:  Recent Labs   Lab 12/14/18  0457 12/15/18  0638 12/16/18  0534    137 138   K 3.8 4.1 3.9    107 107   CO2 27 25 24   BUN 11 12 9   CREATININE 0.8 0.8 0.9   CALCIUM 8.3* 8.2* 8.0*       CBC:  Recent Labs   Lab 12/14/18  0457 12/15/18  0638 12/16/18  0535   WBC 7.28 5.66 4.50   HGB 11.4* 10.8* 10.3*   HCT 34.1* 32.6* 30.9*    193 178       Urine Culture: No results for input(s): LABURIN in the last 168 hours.  Urine Studies:   Recent Labs   Lab 12/13/18  1731   COLORU Yellow   APPEARANCEUA Clear   PHUR 5.0   SPECGRAV 1.015   PROTEINUA Negative   GLUCUA Negative   KETONESU Negative   BILIRUBINUA Negative   OCCULTUA 2+*   NITRITE Negative   UROBILINOGEN Negative   LEUKOCYTESUR Trace*   RBCUA 1   WBCUA 1   BACTERIA Rare   SQUAMEPITHEL 4       Significant Imaging:  CT: I have reviewed all results within the past 24 hours and my personal findings are:  perirectal abscess                    Assessment and Plan:     Gross hematuria    Not clear if she actually had hematuria  Given complex situation, I think she should have a full hematuria eval  Will get Ct urogram in AM'  Will need cysto and pelvic exam as outpatient    [facility] urology team will see pt tomorrow     Incomplete bladder emptying    Decrease dilaudid and bentyl if possible  Add flomax  Monitor PVR         VTE Risk Mitigation (From admission, onward)        Ordered     Place sequential compression device  Until discontinued      12/13/18 2059     IP VTE HIGH RISK PATIENT  Once      12/13/18 2058          Thank you for your consult. [facility] urology team  will follow-up with patient. Please contact us if you have any additional questions.    Remigio Jc MD  Urology  [facility]

## 2018-12-16 NOTE — NURSING
Pt AAOx4, pt ambulating independently, NAD or uncontrollable pain noted. Pt pain level a 6 on a scale of 0-10. Next pain med due mid morning, will perform comforting measures until administration and continue to monitor.

## 2018-12-16 NOTE — HPI
"28 female  History of "IC" and chronic pelvic pain  No [facility] records  Has seen other urologist in past    Now admitted with perirectal abscess  Pt said they may have been some blood in the urine or per vagina; she is not sure    Urine is now clear    Difficulty emptying  Pt is getting dilaudid  PVRs approx 350  Pt declined cath    Difficulty emptying is a chronic problem  Her bladder is also "sensitive to touch"  "

## 2018-12-16 NOTE — ASSESSMENT & PLAN NOTE
Not clear if she actually had hematuria  Given complex situation, I think she should have a full hematuria eval  Will get Ct urogram in AM'  Will need cysto and pelvic exam as outpatient    [facility] urology team will see pt tomorrow

## 2018-12-16 NOTE — SUBJECTIVE & OBJECTIVE
Past Medical History:   Diagnosis Date    Interstitial cystitis     Kidney stone     Migraine     UTI (urinary tract infection)        Past Surgical History:   Procedure Laterality Date    ABDOMINAL SURGERY      DE EXPLORATORY OF ABDOMEN         Review of patient's allergies indicates:  No Known Allergies    Family History     Problem Relation (Age of Onset)    Cancer Mother    Diabetes Father          Tobacco Use    Smoking status: Current Every Day Smoker     Packs/day: 0.50    Smokeless tobacco: Never Used   Substance and Sexual Activity    Alcohol use: Yes     Alcohol/week: 0.0 oz     Comment: occassionally    Drug use: Yes     Types: Marijuana    Sexual activity: Yes     Partners: Male     Birth control/protection: None       Review of Systems    Objective:     Temp:  [97.9 °F (36.6 °C)-98.5 °F (36.9 °C)] 98.1 °F (36.7 °C)  Pulse:  [69-76] 71  Resp:  [16-19] 18  SpO2:  [98 %-100 %] 98 %  BP: (106-120)/(57-72) 113/72     Body mass index is 27.41 kg/m².    Date 12/16/18 0700 - 12/17/18 0659   Shift 1241-1243 4645-9420 0887-1329 24 Hour Total   INTAKE   Shift Total(mL/kg)       OUTPUT   Urine(mL/kg/hr) 650   650   Shift Total(mL/kg) 650(8.7)   650(8.7)   Weight (kg) 74.7 74.7 74.7 74.7     Bladder Scan Volume (mL): 251 mL (12/16/18 1000)  Post Void Cath Residual (mL): 343 mL (12/15/18 0900)    Drains          None          Physical Exam    Ao*4  resp normal rate' breathing not labored  cv normal rate  Ab nondistended  Ext no edema  Bladder not distended    Significant Labs:    BMP:  Recent Labs   Lab 12/14/18  0457 12/15/18  0638 12/16/18  0534    137 138   K 3.8 4.1 3.9    107 107   CO2 27 25 24   BUN 11 12 9   CREATININE 0.8 0.8 0.9   CALCIUM 8.3* 8.2* 8.0*       CBC:  Recent Labs   Lab 12/14/18  0457 12/15/18  0638 12/16/18  0535   WBC 7.28 5.66 4.50   HGB 11.4* 10.8* 10.3*   HCT 34.1* 32.6* 30.9*    193 178       Urine Culture: No results for input(s): LABURIN in the last 168  hours.  Urine Studies:   Recent Labs   Lab 12/13/18  1731   COLORU Yellow   APPEARANCEUA Clear   PHUR 5.0   SPECGRAV 1.015   PROTEINUA Negative   GLUCUA Negative   KETONESU Negative   BILIRUBINUA Negative   OCCULTUA 2+*   NITRITE Negative   UROBILINOGEN Negative   LEUKOCYTESUR Trace*   RBCUA 1   WBCUA 1   BACTERIA Rare   SQUAMEPITHEL 4       Significant Imaging:  CT: I have reviewed all results within the past 24 hours and my personal findings are:  perirectal abscess

## 2018-12-16 NOTE — PLAN OF CARE
Problem: Adult Inpatient Plan of Care  Goal: Plan of Care Review  Outcome: Ongoing (interventions implemented as appropriate)  Pt. AAOx4; calm and cooperative. No falls no injuries.  IV currently infusing. Abdominal pain relieve with pain meds given q4 hrs. PRN. Pt. Ambulate independently; refused SALMA hose. Bladder scan showed 366 cc. Pt. Refused in and out caterization post bladder scan. Scheduled meds given without difficulty. Pt. Instructed to call if assistance was needed. Pt. Instructed multiple times to stop disconnecting self from IV pump. Bed in lowest position. Call light in reach. Will continue to monitor.        12/16/18 0718   Plan of Care Review   Plan of Care Reviewed With patient

## 2018-12-16 NOTE — NURSING
Pt c/o severe generalized abdominal pain that is constant in nature. Pain is a 10  Bladder scan showed greater then 850. In and out catheterization with  return of 900 cc urine.

## 2018-12-16 NOTE — NURSING
Spoke to Dr Padilla regarding pain management. Tylenol added to regimen. Patient states tylenol is not going to help

## 2018-12-16 NOTE — PROGRESS NOTES
[facility]  General Surgery  Progress Note     Subjective:      Interval History: NAOE, reports persistent suprapubic/sacral pain, in & out cath overnight for bladder retention of 500cc.     Objective:   Vital Signs:        Vitals:    12/16/18 1153   BP: 113/72   Pulse: 71   Resp: 18   Temp: 98.1 °F (36.7 °C)       Intake/Output Summary (Last 24 hours):         Intake/Output Summary (Last 24 hours) at 12/16/2018 1348  Last data filed at 12/16/2018 1000  Gross per 24 hour   Intake 4230.41 ml   Output 3450 ml   Net 780.41 ml           Physical Exam:  General: NAD, AAOx3, anxious  HEENT: NCAT, EOMI  CV: RRR, normal S1/S2  Resp: aerating well, symmetric expansion, no distress  Abdomen: soft, +TTP suprapubic, non-distended, no guarding/rebound  : no murillo  Extremities: moves all, no cyanosis    Significant Labs:  Reviewed  Recent Labs   Lab 12/16/18  0535   WBC 4.50   RBC 3.34*   HGB 10.3*   HCT 30.9*      MCV 93   MCH 30.8   MCHC 33.3        Assessment/Plan:     H/o interstitial cystisis, admitted for rectosigmoid inflammation/presacral ?abscess, c/f IBD.    -Plan for EUA w/proctosigmoidoscopy in the OR tomorrow, 12/17  -GI consulted, appreciate recs: outpatient C-scope once active process resolves  -Urology consulted, appreciate recs: CT urogram tomorrow AM, outpatient cysto/pelvic exam; decrease Dilaudid, start flomax  -Continue regular diet, NPO at midnight w/mIVFs  -Continue pain control/antiemetic regimen PRN  -Continue IV abx Zosyn, will add Flagyl  -Fleet enema x2  -DVT PPx: DANIEs      Karly Padilla MD  General Surgery, PGY3

## 2018-12-17 VITALS
SYSTOLIC BLOOD PRESSURE: 108 MMHG | HEART RATE: 63 BPM | DIASTOLIC BLOOD PRESSURE: 73 MMHG | HEIGHT: 65 IN | RESPIRATION RATE: 17 BRPM | WEIGHT: 164.75 LBS | TEMPERATURE: 98 F | BODY MASS INDEX: 27.45 KG/M2 | OXYGEN SATURATION: 98 %

## 2018-12-17 PROBLEM — K61.1 PERIRECTAL ABSCESS: Status: RESOLVED | Noted: 2018-12-13 | Resolved: 2018-12-17

## 2018-12-17 PROBLEM — K65.1 INTRA-ABDOMINAL ABSCESS: Status: RESOLVED | Noted: 2018-12-16 | Resolved: 2018-12-17

## 2018-12-17 LAB
ALBUMIN SERPL BCP-MCNC: 3.3 G/DL
ALP SERPL-CCNC: 28 U/L
ALT SERPL W/O P-5'-P-CCNC: 25 U/L
ANION GAP SERPL CALC-SCNC: 7 MMOL/L
AST SERPL-CCNC: 21 U/L
BACTERIA BLD CULT: NORMAL
BASOPHILS # BLD AUTO: 0.01 K/UL
BASOPHILS NFR BLD: 0.2 %
BILIRUB DIRECT SERPL-MCNC: 0.1 MG/DL
BILIRUB SERPL-MCNC: 0.2 MG/DL
BUN SERPL-MCNC: 9 MG/DL
CALCIUM SERPL-MCNC: 8.4 MG/DL
CHLORIDE SERPL-SCNC: 106 MMOL/L
CO2 SERPL-SCNC: 26 MMOL/L
CREAT SERPL-MCNC: 0.8 MG/DL
DIFFERENTIAL METHOD: ABNORMAL
EOSINOPHIL # BLD AUTO: 0.1 K/UL
EOSINOPHIL NFR BLD: 2.1 %
ERYTHROCYTE [DISTWIDTH] IN BLOOD BY AUTOMATED COUNT: 12.4 %
EST. GFR  (AFRICAN AMERICAN): >60 ML/MIN/1.73 M^2
EST. GFR  (NON AFRICAN AMERICAN): >60 ML/MIN/1.73 M^2
GLUCOSE SERPL-MCNC: 88 MG/DL
HCT VFR BLD AUTO: 32.5 %
HGB BLD-MCNC: 10.8 G/DL
LYMPHOCYTES # BLD AUTO: 1.8 K/UL
LYMPHOCYTES NFR BLD: 36.8 %
MCH RBC QN AUTO: 30.4 PG
MCHC RBC AUTO-ENTMCNC: 33.2 G/DL
MCV RBC AUTO: 92 FL
MONOCYTES # BLD AUTO: 0.4 K/UL
MONOCYTES NFR BLD: 8.4 %
NEUTROPHILS # BLD AUTO: 2.5 K/UL
NEUTROPHILS NFR BLD: 52.5 %
PLATELET # BLD AUTO: 180 K/UL
PMV BLD AUTO: 10.7 FL
POTASSIUM SERPL-SCNC: 3.6 MMOL/L
PROT SERPL-MCNC: 6 G/DL
RBC # BLD AUTO: 3.55 M/UL
SODIUM SERPL-SCNC: 139 MMOL/L
WBC # BLD AUTO: 4.76 K/UL

## 2018-12-17 PROCEDURE — 99233 SBSQ HOSP IP/OBS HIGH 50: CPT | Mod: ,,, | Performed by: UROLOGY

## 2018-12-17 PROCEDURE — 36415 COLL VENOUS BLD VENIPUNCTURE: CPT

## 2018-12-17 PROCEDURE — 94761 N-INVAS EAR/PLS OXIMETRY MLT: CPT

## 2018-12-17 PROCEDURE — 0DJD8ZZ INSPECTION OF LOWER INTESTINAL TRACT, VIA NATURAL OR ARTIFICIAL OPENING ENDOSCOPIC: ICD-10-PCS | Performed by: SURGERY

## 2018-12-17 PROCEDURE — 25000003 PHARM REV CODE 250: Performed by: UROLOGY

## 2018-12-17 PROCEDURE — 25000003 PHARM REV CODE 250: Performed by: INTERNAL MEDICINE

## 2018-12-17 PROCEDURE — 63600175 PHARM REV CODE 636 W HCPCS: Performed by: STUDENT IN AN ORGANIZED HEALTH CARE EDUCATION/TRAINING PROGRAM

## 2018-12-17 PROCEDURE — 25500020 PHARM REV CODE 255: Performed by: SURGERY

## 2018-12-17 PROCEDURE — S4991 NICOTINE PATCH NONLEGEND: HCPCS | Performed by: EMERGENCY MEDICINE

## 2018-12-17 PROCEDURE — S0030 INJECTION, METRONIDAZOLE: HCPCS | Performed by: STUDENT IN AN ORGANIZED HEALTH CARE EDUCATION/TRAINING PROGRAM

## 2018-12-17 PROCEDURE — 25000003 PHARM REV CODE 250: Performed by: NURSE PRACTITIONER

## 2018-12-17 PROCEDURE — 25000003 PHARM REV CODE 250: Performed by: SURGERY

## 2018-12-17 PROCEDURE — 80076 HEPATIC FUNCTION PANEL: CPT

## 2018-12-17 PROCEDURE — 25000003 PHARM REV CODE 250: Performed by: STUDENT IN AN ORGANIZED HEALTH CARE EDUCATION/TRAINING PROGRAM

## 2018-12-17 PROCEDURE — 80048 BASIC METABOLIC PNL TOTAL CA: CPT

## 2018-12-17 PROCEDURE — 99499 UNLISTED E&M SERVICE: CPT | Mod: ,,, | Performed by: SURGERY

## 2018-12-17 PROCEDURE — 85025 COMPLETE CBC W/AUTO DIFF WBC: CPT

## 2018-12-17 PROCEDURE — 63600175 PHARM REV CODE 636 W HCPCS: Performed by: NURSE PRACTITIONER

## 2018-12-17 PROCEDURE — 25000003 PHARM REV CODE 250: Performed by: EMERGENCY MEDICINE

## 2018-12-17 PROCEDURE — 51798 US URINE CAPACITY MEASURE: CPT

## 2018-12-17 PROCEDURE — 63600175 PHARM REV CODE 636 W HCPCS: Performed by: SURGERY

## 2018-12-17 RX ORDER — IBUPROFEN 600 MG/1
600 TABLET ORAL 3 TIMES DAILY
Qty: 60 TABLET | Refills: 2 | Status: SHIPPED | OUTPATIENT
Start: 2018-12-17 | End: 2019-08-31

## 2018-12-17 RX ORDER — TAMSULOSIN HYDROCHLORIDE 0.4 MG/1
0.4 CAPSULE ORAL DAILY
Qty: 30 CAPSULE | Refills: 11 | Status: SHIPPED | OUTPATIENT
Start: 2018-12-18 | End: 2019-01-14

## 2018-12-17 RX ORDER — AMOXICILLIN AND CLAVULANATE POTASSIUM 875; 125 MG/1; MG/1
1 TABLET, FILM COATED ORAL EVERY 12 HOURS
Qty: 8 TABLET | Refills: 0 | Status: SHIPPED | OUTPATIENT
Start: 2018-12-17 | End: 2018-12-21

## 2018-12-17 RX ORDER — ADHESIVE BANDAGE
30 BANDAGE TOPICAL ONCE
Status: DISCONTINUED | OUTPATIENT
Start: 2018-12-17 | End: 2018-12-17 | Stop reason: HOSPADM

## 2018-12-17 RX ORDER — OXYCODONE AND ACETAMINOPHEN 10; 325 MG/1; MG/1
1 TABLET ORAL EVERY 4 HOURS PRN
Qty: 20 TABLET | Refills: 0 | Status: SHIPPED | OUTPATIENT
Start: 2018-12-17 | End: 2018-12-26

## 2018-12-17 RX ORDER — MORPHINE SULFATE 4 MG/ML
2 INJECTION, SOLUTION INTRAMUSCULAR; INTRAVENOUS EVERY 6 HOURS PRN
Status: DISCONTINUED | OUTPATIENT
Start: 2018-12-17 | End: 2018-12-17

## 2018-12-17 RX ORDER — AMOXICILLIN AND CLAVULANATE POTASSIUM 875; 125 MG/1; MG/1
1 TABLET, FILM COATED ORAL EVERY 12 HOURS
Status: DISCONTINUED | OUTPATIENT
Start: 2018-12-17 | End: 2018-12-17 | Stop reason: HOSPADM

## 2018-12-17 RX ORDER — DIPHENHYDRAMINE HYDROCHLORIDE 50 MG/ML
12.5 INJECTION INTRAMUSCULAR; INTRAVENOUS EVERY 8 HOURS PRN
Status: DISCONTINUED | OUTPATIENT
Start: 2018-12-17 | End: 2018-12-17 | Stop reason: HOSPADM

## 2018-12-17 RX ORDER — OXYCODONE HYDROCHLORIDE 5 MG/1
10 TABLET ORAL EVERY 4 HOURS PRN
Status: DISCONTINUED | OUTPATIENT
Start: 2018-12-17 | End: 2018-12-17 | Stop reason: HOSPADM

## 2018-12-17 RX ADMIN — OXYCODONE HYDROCHLORIDE 10 MG: 5 TABLET ORAL at 01:12

## 2018-12-17 RX ADMIN — ACETAMINOPHEN 650 MG: 325 TABLET ORAL at 12:12

## 2018-12-17 RX ADMIN — ACETAMINOPHEN 650 MG: 325 TABLET ORAL at 05:12

## 2018-12-17 RX ADMIN — ONDANSETRON 4 MG: 2 INJECTION INTRAMUSCULAR; INTRAVENOUS at 08:12

## 2018-12-17 RX ADMIN — POLYETHYLENE GLYCOL 3350 17 G: 17 POWDER, FOR SOLUTION ORAL at 09:12

## 2018-12-17 RX ADMIN — DEXTROSE, SODIUM CHLORIDE, AND POTASSIUM CHLORIDE: 5; .2; .15 INJECTION INTRAVENOUS at 12:12

## 2018-12-17 RX ADMIN — IOHEXOL 125 ML: 350 INJECTION, SOLUTION INTRAVENOUS at 09:12

## 2018-12-17 RX ADMIN — MORPHINE SULFATE 2 MG: 4 INJECTION, SOLUTION INTRAMUSCULAR; INTRAVENOUS at 12:12

## 2018-12-17 RX ADMIN — CELECOXIB 200 MG: 100 CAPSULE ORAL at 08:12

## 2018-12-17 RX ADMIN — OXYCODONE HYDROCHLORIDE 10 MG: 5 TABLET ORAL at 08:12

## 2018-12-17 RX ADMIN — DIPHENHYDRAMINE HYDROCHLORIDE 12.5 MG: 50 INJECTION, SOLUTION INTRAMUSCULAR; INTRAVENOUS at 02:12

## 2018-12-17 RX ADMIN — GABAPENTIN 600 MG: 300 CAPSULE ORAL at 05:12

## 2018-12-17 RX ADMIN — ONDANSETRON 4 MG: 2 INJECTION INTRAMUSCULAR; INTRAVENOUS at 02:12

## 2018-12-17 RX ADMIN — METRONIDAZOLE 500 MG: 500 INJECTION, SOLUTION INTRAVENOUS at 03:12

## 2018-12-17 RX ADMIN — TAMSULOSIN HYDROCHLORIDE 0.4 MG: 0.4 CAPSULE ORAL at 08:12

## 2018-12-17 RX ADMIN — NICOTINE 1 PATCH: 7 PATCH, EXTENDED RELEASE TRANSDERMAL at 09:12

## 2018-12-17 RX ADMIN — MORPHINE SULFATE 2 MG: 4 INJECTION, SOLUTION INTRAMUSCULAR; INTRAVENOUS at 04:12

## 2018-12-17 RX ADMIN — SCOPALAMINE 1 PATCH: 1 PATCH, EXTENDED RELEASE TRANSDERMAL at 12:12

## 2018-12-17 RX ADMIN — PIPERACILLIN AND TAZOBACTAM 4.5 G: 4; .5 INJECTION, POWDER, LYOPHILIZED, FOR SOLUTION INTRAVENOUS; PARENTERAL at 04:12

## 2018-12-17 NOTE — SUBJECTIVE & OBJECTIVE
Interval History: o/n patient continues with difficult pain control.  She was sleeping comfortably in bed this AM.  Nursing staff reports she is often leaving the floor to smoke.     Medications:  Continuous Infusions:   dextrose 5 % and 0.2 % NaCl with KCl 20 mEq 100 mL/hr at 12/17/18 0657     Scheduled Meds:   acetaminophen  650 mg Oral Q6H    celecoxib  200 mg Oral BID    gabapentin  600 mg Oral TID    lidocaine  1 patch Transdermal Q24H    metronidazole  500 mg Intravenous Q8H    nicotine  1 patch Transdermal Daily    piperacillin-tazobactam (ZOSYN) IVPB  4.5 g Intravenous Q8H    polyethylene glycol  17 g Oral BID    QUEtiapine  12.5 mg Oral Once    scopolamine  1 patch Transdermal Q3 Days    tamsulosin  0.4 mg Oral Daily     PRN Meds:bisacodyl, diphenhydrAMINE, morphine, ondansetron, oxyCODONE, promethazine (PHENERGAN) IVPB     Review of patient's allergies indicates:  No Known Allergies  Objective:     Vital Signs (Most Recent):  Temp: 98.2 °F (36.8 °C) (12/17/18 0728)  Pulse: (!) 55 (12/17/18 0728)  Resp: 15 (12/17/18 0728)  BP: 107/65 (12/17/18 0728)  SpO2: 95 % (12/17/18 0728) Vital Signs (24h Range):  Temp:  [97.7 °F (36.5 °C)-98.5 °F (36.9 °C)] 98.2 °F (36.8 °C)  Pulse:  [55-83] 55  Resp:  [15-19] 15  SpO2:  [95 %-100 %] 95 %  BP: ()/(55-82) 107/65     Weight: 74.7 kg (164 lb 11.7 oz)  Body mass index is 27.41 kg/m².    Intake/Output - Last 3 Shifts       12/15 0700 - 12/16 0659 12/16 0700 - 12/17 0659 12/17 0700 - 12/18 0659    P.O. 600 240     I.V. (mL/kg) 6280.8 (84.1)      IV Piggyback 700      Total Intake(mL/kg) 7580.8 (101.5) 240 (3.2)     Urine (mL/kg/hr) 3643 (2) 1050 (0.6)     Stool 0 0     Total Output 3643 1050     Net +3937.8 -810            Urine Occurrence 1 x      Stool Occurrence 0 x 0 x           Physical Exam   NAD, AAO  Sleeping comfortably  Normal breathing effort and chest rise  RRR  Soft, NT, ND  No edema    Significant Labs:  CBC:   Recent Labs   Lab  12/17/18  0502   WBC 4.76   RBC 3.55*   HGB 10.8*   HCT 32.5*      MCV 92   MCH 30.4   MCHC 33.2     BMP:   Recent Labs   Lab 12/16/18  0534   *      K 3.9      CO2 24   BUN 9   CREATININE 0.9   CALCIUM 8.0*       Significant Diagnostics:  no new imaging

## 2018-12-17 NOTE — PROGRESS NOTES
[facility] [place]  General Surgery  Progress Note    Subjective:     History of Present Illness:  28 y.o. female w/PMH significant for interstitial cystitis/recurrent UTIs who presented to the ER after acute onset of rectal pain during vaginal intercourse tonight w/associated mucous/bloody discharge from both her vagina/rectum. She denies ever having similar symptoms in the past, states her last BM was this AM w/o blood. Currently, c/o positional suprapubic/lower back/sacral pain found to have presacral/intramural rectal fluid collection w/concern for possible abscess as well as inflammatory/bowel wall thickening on CT A/P. Currently on OCPs, LMP ~1mo. Ago, denies h/o endometriosis; reports a family history of UC in her brother and colon cancer in her grandfather.    Post-Op Info:  Procedure(s) (LRB):  Exam under anesthesia (N/A)  PROCTOSIGMOIDOSCOPY (N/A)         Interval History: o/n patient continues with difficult pain control.  She was sleeping comfortably in bed this AM.  Nursing staff reports she is often leaving the floor to smoke.     Medications:  Continuous Infusions:   dextrose 5 % and 0.2 % NaCl with KCl 20 mEq 100 mL/hr at 12/17/18 0657     Scheduled Meds:   acetaminophen  650 mg Oral Q6H    celecoxib  200 mg Oral BID    gabapentin  600 mg Oral TID    lidocaine  1 patch Transdermal Q24H    metronidazole  500 mg Intravenous Q8H    nicotine  1 patch Transdermal Daily    piperacillin-tazobactam (ZOSYN) IVPB  4.5 g Intravenous Q8H    polyethylene glycol  17 g Oral BID    QUEtiapine  12.5 mg Oral Once    scopolamine  1 patch Transdermal Q3 Days    tamsulosin  0.4 mg Oral Daily     PRN Meds:bisacodyl, diphenhydrAMINE, morphine, ondansetron, oxyCODONE, promethazine (PHENERGAN) IVPB     Review of patient's allergies indicates:  No Known Allergies  Objective:     Vital Signs (Most Recent):  Temp: 98.2 °F (36.8 °C) (12/17/18 0728)  Pulse: (!) 55 (12/17/18 0728)  Resp: 15 (12/17/18  0728)  BP: 107/65 (12/17/18 0728)  SpO2: 95 % (12/17/18 0728) Vital Signs (24h Range):  Temp:  [97.7 °F (36.5 °C)-98.5 °F (36.9 °C)] 98.2 °F (36.8 °C)  Pulse:  [55-83] 55  Resp:  [15-19] 15  SpO2:  [95 %-100 %] 95 %  BP: ()/(55-82) 107/65     Weight: 74.7 kg (164 lb 11.7 oz)  Body mass index is 27.41 kg/m².    Intake/Output - Last 3 Shifts       12/15 0700 - 12/16 0659 12/16 0700 - 12/17 0659 12/17 0700 - 12/18 0659    P.O. 600 240     I.V. (mL/kg) 6280.8 (84.1)      IV Piggyback 700      Total Intake(mL/kg) 7580.8 (101.5) 240 (3.2)     Urine (mL/kg/hr) 3643 (2) 1050 (0.6)     Stool 0 0     Total Output 3643 1050     Net +3937.8 -810            Urine Occurrence 1 x      Stool Occurrence 0 x 0 x           Physical Exam   NAD, AAO  Sleeping comfortably  Normal breathing effort and chest rise  RRR  Soft, NT, ND  No edema    Significant Labs:  CBC:   Recent Labs   Lab 12/17/18  0502   WBC 4.76   RBC 3.55*   HGB 10.8*   HCT 32.5*      MCV 92   MCH 30.4   MCHC 33.2     BMP:   Recent Labs   Lab 12/16/18  0534   *      K 3.9      CO2 24   BUN 9   CREATININE 0.9   CALCIUM 8.0*       Significant Diagnostics:  no new imaging    Assessment/Plan:     Perirectal abscess    Intramural Sigmoid/rectal abscess.  Unclear etiology, family history of UC  Continue IV abx  NPO for EUA, procto today  Urology evaluated regarding interstitial cystitis - CT urogram today, cysto as an outpatient   Pain control- will transition to better PO control after procedure w/ scheduled ibuprofen   OOB/Ambulate         Alba Malik MD  General Surgery  [facility]

## 2018-12-17 NOTE — NURSING
Patient bladder scanned, 300 mL in bladder. In out cath performed 400 mL collected. Patient still c/o pain and pressure.

## 2018-12-17 NOTE — NURSING
Patient disconnected herself from the IVF that were infusing to take a shower. Educated patient on importance of keeping IV connected and if she needs to disconnect for any reason to contact the nurse. Will continue to monitor.

## 2018-12-17 NOTE — DISCHARGE SUMMARY
[facility] [place]  General Surgery  Discharge Summary      Patient Name: Crystal Tsai  MRN: [id number]  Admission Date: 12/13/2018  Hospital Length of Stay: 1 days  Discharge Date and Time:  12/17/2018 5:52 PM  Attending Physician: Renzo Crawford MD   Discharging Provider: Alba Malik MD  Primary Care Provider: Jayme Romano NP     HPI: 28 y.o. female w/PMH significant for interstitial cystitis/recurrent UTIs who presented to the ER after acute onset of rectal pain during vaginal intercourse tonight w/associated mucous/bloody discharge from both her vagina/rectum. She denies ever having similar symptoms in the past, states her last BM was this AM w/o blood. Currently, c/o positional suprapubic/lower back/sacral pain found to have presacral/intramural rectal fluid collection w/concern for possible abscess as well as inflammatory/bowel wall thickening on CT A/P. Currently on OCPs, LMP ~1mo. Ago, denies h/o endometriosis; reports a family history of UC in her brother and colon cancer in her grandfather.    Procedure(s) (LRB):  Exam under anesthesia (N/A)  PROCTOSIGMOIDOSCOPY (N/A)     Hospital Course: Patient remained afebrile with no white blood count throughout her stay.  She continued to complain of severe pain/pressure that is only relieved momentarily by IV pain medication.  Urology was consulted to evaluate her given the pain and her history of insterstitial cystitis. Repeat CT scan demonstrated complete resolution of the colonic inflammation/intramural fluid collection. Urology recommended continued outpatient follow up and management with cystoscopy.  She was started on flomax as well to help with her urinary retention. GI was consulted given her family history and the rectal inflammation and they also recommended outpatient follow up with colonoscopy to evaluate for source of inflammation.  She was incidentally seen to have dilation of her biliary tree in the absence of any LFT  abnormalities.  An MRCP was done for evaluation and was within normal limits. The patient grew increasingly frustrated with her care and inability to obtain a diagnosis for the source of rectal discomfort that she was having.  It is believed that this could be secondary to trauma related to vaginal vs anal intercourse, IBD or an incidental finding and sequale of her interstitial cystitis.  She is being discharged with flomax, pain medication and completion of a one week antibiotic course.     It was recommended and stressed to her how important outpatient follow up is given her chronic condition and pain issues related to the interstitial cystitis.  Discussed smoking cessation as well as pelvic floor physical therapy and exercises to help improve pain.       Consults:   Consults (From admission, onward)        Status Ordering Provider     Inpatient consult to Gastroenterology  Once     Provider:  Michi Nelson MD    Completed DELL MATOS          Significant Diagnostic Studies: Labs:   CMP   Recent Labs   Lab 12/16/18  0534 12/17/18  0502    139   K 3.9 3.6    106   CO2 24 26   * 88   BUN 9 9   CREATININE 0.9 0.8   CALCIUM 8.0* 8.4*   PROT  --  6.0   ALBUMIN  --  3.3*   BILITOT  --  0.2   ALKPHOS  --  28*   AST  --  21   ALT  --  25   ANIONGAP 7* 7*   ESTGFRAFRICA >60 >60   EGFRNONAA >60 >60    and CBC   Recent Labs   Lab 12/16/18  0535 12/17/18  0502   WBC 4.50 4.76   HGB 10.3* 10.8*   HCT 30.9* 32.5*    180       EXAMINATION:  CT UROGRAM ABD PELVIS W WO    CLINICAL HISTORY:  Hematuria;hematuria; perirectal abscess;    TECHNIQUE:  Low dose axial, sagittal and coronal reformations were obtained from the lung bases to the pubic symphysis before and following the IV administration of 125 mL of Omnipaque 350.  Timing was optimized for nephrogram and excretory renal phases.    COMPARISON:  12/13/2018    FINDINGS:  0 lung bases demonstrate dependent atelectasis bilaterally.  The heart  appears normal in size.  There is no pericardial or pleural fluid.    The liver measures 18 cm, within normal limits.  There is no definite evidence for mass or biliary dilation.  However, when compared with the prior study, the biliary tree appears slightly more prominent.  Additionally, there is low attenuation small filling defect within the distal CBD which could reflect a small cholesterol stone.  Consider further evaluation with MRCP/ERCP.  The hepatic artery and portal vein are patent.  Gallbladder, adrenal glands, spleen, and pancreas appear unremarkable.    Kidneys are normal in size and position.  There is no evidence for nephrolithiasis or enhancing mass.  There is excretion of contrast.  There is slight asymmetry of the collecting system on the right without definite evidence for hydronephrosis.  The right ureter is prominent to the level of the iliac vasculature.  The distal right ureter cannot be visualized.  A stricture in this region cannot be excluded.  Urinary bladder is unremarkable.  Uterus and ovaries demonstrate no acute finding.    The stomach, small bowel, and appendix demonstrate no acute abnormality.  Colon is also unremarkable.  Previously described.  Rectal abscess is no longer evident.  If concern persists, further evaluation with endoscopy could be considered.  No bowel obstruction or inflammation.  There is no ascites or free air.    Aorta and branches are patent.  No atherosclerosis or aneurysm.  IVC and iliac veins are unremarkable.  There is no evidence for lymph node enlargement.  No hernia.  Subcutaneous soft tissues are intact.  There are pars defects at L5 bilaterally.  Sagittal alignment is maintained.  Broad-based disc bulge is noted at this level as well.  There is no acute fracture or osseous destructive process.      Impression       1. Mild prominence of the collecting system on the right, of uncertain etiology.  There is stricture of the right distal ureter at the level  of the iliac vasculature.  Further evaluation under direct visualization is advised.  2. Mild interval prominence of the biliary tree with possible filling defects as described above.  Further evaluation with MRCP is advised.  3. No evidence for nephrolithiasis or obstructive uropathy.  4. See body of report for details and other incidental findings.  5.  This report was flagged in Epic as abnormal.     EXAMINATION:  MRI ABDOMEN WITHOUT CONTRAST MRCP    CLINICAL HISTORY:  biliary dilation;    TECHNIQUE:  Multiplanar, multisequence images are performed through the liver and upper abdomen.  Additionally 2D and 3D MRCP sequences are performed as well as MIP images.    COMPARISON:  CT from earlier same day.    FINDINGS:   images are unremarkable.  Lung bases and visualized heart are clear.  No pericardial or pleural effusion.    Liver measures approximately 17 cm, within normal limits.  No definite evidence for liver mass noted evaluation is limited due to lack of IV contrast.  No evidence for hepatic steatosis.  The biliary tree is normal in caliber.  There is no evidence for biliary dilatation.  No filling defects within the biliary tree to suggest choledocholithiasis.  Findings from CT from earlier today and may have been artifactual.  The gallbladder is unremarkable.  The adrenal glands, spleen, and pancreas are unremarkable.    Kidneys are normal in size and position.  There is no evidence for hydronephrosis.  The stomach is distended with ingested contents.  The visualized portions of the small bowel and colon are unremarkable.  Aorta and IVC are unremarkable.  There is no evidence for lymph node enlargement.  Subcutaneous soft tissues appear unremarkable.  There is no abnormal osseous signal.      Impression       1. No evidence for biliary dilation of filling defects within the biliary tree to suggest choledocholithiasis.  Findings from earlier CT may have been artifactual.  2. See body of report for  details and other incidental findings.           Pending Diagnostic Studies:     None        Final Active Diagnoses:    Diagnosis Date Noted POA    PRINCIPAL PROBLEM:  Pelvic pain [R10.2] 05/24/2018 Yes    Interstitial cystitis [N30.10] 04/08/2014 Yes    Incomplete bladder emptying [R33.9] 12/16/2018 Yes    Gross hematuria [R31.0] 12/16/2018 Yes      Problems Resolved During this Admission:    Diagnosis Date Noted Date Resolved POA    Intra-abdominal abscess [K65.1] 12/16/2018 12/17/2018 Yes    Perirectal abscess [K61.1] 12/13/2018 12/17/2018 Yes      Discharged Condition: good    Disposition: Home or Self Care    Follow Up:  Follow-up Information     Jayme Romano NP On 12/17/2018.    Specialty:  Internal Medicine  Why:  Monday at 9:15am,  Hospital Follow up appointment  Contact information:  [address] [facility] [address]  [phone]             Michi Nelson MD In 2 weeks.    Specialty:  Gastroenterology  Why:  cscope eval, EUS eval  Contact information:  [address] [place] [address]  [id number]  [facility]  [facility] [id number] [phone]             Established Urologist.    Why:  Patient states she is planning to follow up with them in January           Kirsty Acevedo MD In 2 weeks.    Specialty:  Urology  Why:  f/u interstitial cystitis, eval cystoscopy if would like to switch urology providers  Contact information:  [address]  [address]  [facility] [address]  [phone]                 Patient Instructions:      Diet Adult Regular     Activity as tolerated     Medications:  Reconciled Home Medications:      Medication List      START taking these medications    amoxicillin-clavulanate 875-125mg 875-125 mg per tablet  Commonly known as:  AUGMENTIN  Take 1 tablet by mouth every 12 (twelve) hours. for 4 days     ibuprofen 600 MG tablet  Commonly known as:  ADVIL,MOTRIN  Take 1 tablet (600 mg total) by mouth 3 (three) times daily.      oxyCODONE-acetaminophen  mg per tablet  Commonly known as:  PERCOCET  Take 1 tablet by mouth every 4 (four) hours as needed for Pain.     tamsulosin 0.4 mg Cap  Commonly known as:  FLOMAX  Take 1 capsule (0.4 mg total) by mouth once daily.  Start taking on:  12/18/2018        CONTINUE taking these medications    dicyclomine 20 mg tablet  Commonly known as:  BENTYL  Take 1 tablet (20 mg total) by mouth 3 (three) times daily as needed.     gabapentin 300 MG capsule  Commonly known as:  NEURONTIN  Take 2 capsules (600 mg total) by mouth 3 (three) times daily.     phentermine 37.5 mg tablet  Commonly known as:  ADIPEX-P  Take 18.75 mg by mouth 2 (two) times daily.     VYFEMLA (28) 0.4-35 mg-mcg per tablet  Generic drug:  norethindrone-ethinyl estradiol  TAKE 1 TABLET BY MOUTH EVERY DAY            Alba Malik MD  General Surgery  [facility]

## 2018-12-17 NOTE — PLAN OF CARE
Problem: Adult Inpatient Plan of Care  Goal: Plan of Care Review  Outcome: Ongoing (interventions implemented as appropriate)   12/17/18 0512   Plan of Care Review   Plan of Care Reviewed With patient   Patient AAOx4. Patient ambulates independently and makes all needs known, remains free from fall or injury during shift. All scheduled meds administered as ordered and tolerated well. Pain mildly relieved by pain medication. In and out cath performed with minimal relief. Safety maintained, will continue with plan of care.

## 2018-12-17 NOTE — SUBJECTIVE & OBJECTIVE
Interval History: Many complaints this AM. Required I&O cath early AM with large UOP. Started Flomax. Pt expresses dissatisfaction in care and pain control. Planned for CT today.      Review of Systems   Constitutional: Negative for appetite change, chills and fever.   HENT: Negative for congestion, sore throat and trouble swallowing.    Eyes: Negative for pain and itching.   Respiratory: Negative for cough and shortness of breath.    Cardiovascular: Negative for chest pain, palpitations and leg swelling.   Gastrointestinal: Positive for abdominal pain. Negative for abdominal distention, constipation, diarrhea, nausea and vomiting.   Genitourinary: Positive for difficulty urinating and dysuria. Negative for flank pain, hematuria, nocturia and urgency.   Musculoskeletal: Negative for back pain, neck pain and neck stiffness.   Skin: Negative for rash and wound.   Neurological: Negative for dizziness and seizures.   Hematological: Negative for adenopathy. Does not bruise/bleed easily.   Psychiatric/Behavioral: Negative for confusion. The patient is not nervous/anxious.    All other systems reviewed and are negative.    Objective:     Temp:  [97.7 °F (36.5 °C)-98.5 °F (36.9 °C)] 98.2 °F (36.8 °C)  Pulse:  [55-83] 55  Resp:  [15-19] 15  SpO2:  [94 %-100 %] 94 %  BP: ()/(55-82) 107/65     Body mass index is 27.41 kg/m².       Bladder Scan Volume (mL): 300 mL (12/17/18 0200)  Post Void Cath Residual (mL): 400 mL (12/17/18 0200)    Drains          None          Physical Exam   Vitals reviewed.  Constitutional: She is oriented to person, place, and time. She appears well-developed and well-nourished. No distress.   HENT:   Head: Normocephalic and atraumatic.   Neck: Normal range of motion.   Cardiovascular: Normal rate and regular rhythm.    Pulmonary/Chest: Effort normal and breath sounds normal. No respiratory distress.   Abdominal: Soft. She exhibits no distension and no mass. There is no tenderness. There is no  rebound and no guarding.   Musculoskeletal: Normal range of motion.   Neurological: She is alert and oriented to person, place, and time.   Skin: Skin is warm and dry. No rash noted. She is not diaphoretic. No erythema.     Psychiatric: She has a normal mood and affect. Her behavior is normal.       Significant Labs:    BMP:  Recent Labs   Lab 12/15/18  0638 12/16/18  0534 12/17/18  0502    138 139   K 4.1 3.9 3.6    107 106   CO2 25 24 26   BUN 12 9 9   CREATININE 0.8 0.9 0.8   CALCIUM 8.2* 8.0* 8.4*       CBC:   Recent Labs   Lab 12/15/18  0638 12/16/18  0535 12/17/18  0502   WBC 5.66 4.50 4.76   HGB 10.8* 10.3* 10.8*   HCT 32.6* 30.9* 32.5*    178 180       Blood Culture:   Recent Labs   Lab 12/13/18  1922 12/13/18  1932   LABBLOO Gram stain brinda bottle: Gram negative rods  Results called to and read back by:colin HOROWITZ 12/15/2018  14:48 No Growth to date  No Growth to date  No Growth to date  No Growth to date     Urine Culture: No results for input(s): LABURIN in the last 168 hours.  Urine Studies:   Recent Labs   Lab 12/13/18  1731   COLORU Yellow   APPEARANCEUA Clear   PHUR 5.0   SPECGRAV 1.015   PROTEINUA Negative   GLUCUA Negative   KETONESU Negative   BILIRUBINUA Negative   OCCULTUA 2+*   NITRITE Negative   UROBILINOGEN Negative   LEUKOCYTESUR Trace*   RBCUA 1   WBCUA 1   BACTERIA Rare   SQUAMEPITHEL 4       Significant Imaging:  All pertinent imaging results/findings from the past 24 hours have been reviewed.

## 2018-12-17 NOTE — ASSESSMENT & PLAN NOTE
Intramural Sigmoid/rectal abscess.  Unclear etiology, family history of UC  Continue IV abx  NPO for EUA, procto today  Urology evaluated regarding interstitial cystitis - CT urogram today, cysto as an outpatient   Pain control- will transition to better PO control after procedure w/ scheduled ibuprofen   OOB/Ambulate

## 2018-12-17 NOTE — PROGRESS NOTES
Chief Complaint / Reason for Consult: Abnormal Imaging, Mariza-rectal abscess    No new complaints, tolerating PO.      ROS: No CP, SOB, F/C    Patient Vitals for the past 24 hrs:   BP Temp Temp src Pulse Resp SpO2   12/17/18 1135 108/73 98 °F (36.7 °C) Oral 63 17 98 %   12/17/18 0800 -- -- -- -- -- (!) 94 %   12/17/18 0728 107/65 98.2 °F (36.8 °C) Oral (!) 55 15 95 %   12/17/18 0438 (!) 87/55 97.7 °F (36.5 °C) Oral 62 17 99 %   12/16/18 2319 120/82 97.8 °F (36.6 °C) Axillary 71 19 99 %   12/16/18 1959 115/65 98.5 °F (36.9 °C) Oral 83 17 100 %   12/16/18 1621 125/72 98.2 °F (36.8 °C) Oral 67 17 98 %       Physical Exam:  Gen - Well developed, well nourished, no apparent distress  HEENT - Anicteric  CV - S1, S2, no murmurs/rubs  Lungs - CTA-B, normal excursion  Abd - Soft, mild TTP across lower abdomen, ND, normal BS's, no HSM.  Ext - No c/c/e  Neuro - No asterixis    Labs:  Recent Labs   Lab 12/17/18  0502   WBC 4.76   RBC 3.55*   HGB 10.8*   HCT 32.5*      MCV 92   MCH 30.4   MCHC 33.2     Recent Labs   Lab 12/17/18  0502   CALCIUM 8.4*      K 3.6   CO2 26      BUN 9   CREATININE 0.8       Imaging:  CT:  1. Mild prominence of the collecting system on the right, of uncertain etiology.  There is stricture of the right distal ureter at the level of the iliac vasculature.  Further evaluation under direct visualization is advised.  2. Mild interval prominence of the biliary tree with possible filling defects as described above.  Further evaluation with MRCP is advised.  3. No evidence for nephrolithiasis or obstructive uropathy.    Assessment:  This patient is a 28 y.o. female with:   1. Abnormal Imaging, Thickening of Sigmoid/Rectum - Unclear etiology, history not entirely consistent with IBD/Proctitis.    2. Pelvic/Rectal Pain - secondary to #1.  3. Abnormal CT, possible choledocholithiasis - Normal LFT's, current symptoms not consistent with biliary etiology.    4. Hx of Interstitial Cystitis, UTI,  Kidney Stones....      Recommendations:  1. Monitor abd.Exam.  2. Surgical evaluation ongoing.  3. Abx, OK for bland diet.  4. Will need full C-scope to r/o IBD, in near future, preferably after acute issues have improved.  Outpatient.    5. Continue with Urology and Surgical interventions.  6. Outpatient EUS for possible CBD stone.

## 2018-12-17 NOTE — PLAN OF CARE
Problem: Adult Inpatient Plan of Care  Goal: Plan of Care Review  Outcome: Ongoing (interventions implemented as appropriate)  Pt AAOx4 pain now being managed with additional scheduled morphine, gabapentin, with prn dilaudid, oxycodone, and nausea is being managed with scheduled zofran. Pt ambulates independently, free of falls, injury or trauma during shift. Pt makes all needs known and is continent of bowel and bladder. Afebrile, pt has tolerated all scheduled meds and abx. Refused murillo cath while c/o severe abdominal pain. In and out cath was performed and pt had some relief. Safety maintained, will continue to monitor.

## 2018-12-17 NOTE — ASSESSMENT & PLAN NOTE
Decrease Dilaudid and Bentyl if possible  Flomax added  Monitor PVR  I&O cath PRN  Reports this is a chronic issue

## 2018-12-17 NOTE — PROGRESS NOTES
"[facility]  Urology  Progress Note    Patient Name: Crystal Tsai  MRN: [id number]  Admission Date: 12/13/2018  Hospital Length of Stay: 1 days  Code Status: Full Code   Attending Provider: Renzo Crawford MD   Primary Care Physician: Jayme Romano NP    Subjective:     HPI:  28 female  History of "IC" and chronic pelvic pain  No [facility] records  Has seen other urologist in past    Now admitted with perirectal abscess  Pt said they may have been some blood in the urine or per vagina; she is not sure    Urine is now clear    Difficulty emptying  Pt is getting dilaudid  PVRs approx 350  Pt declined cath    Difficulty emptying is a chronic problem  Her bladder is also "sensitive to touch"    Interval History: Many complaints this AM. Required I&O cath early AM with large UOP. Started Flomax. Pt expresses dissatisfaction in care and pain control. Planned for CT today.      Review of Systems   Constitutional: Negative for appetite change, chills and fever.   HENT: Negative for congestion, sore throat and trouble swallowing.    Eyes: Negative for pain and itching.   Respiratory: Negative for cough and shortness of breath.    Cardiovascular: Negative for chest pain, palpitations and leg swelling.   Gastrointestinal: Positive for abdominal pain. Negative for abdominal distention, constipation, diarrhea, nausea and vomiting.   Genitourinary: Positive for difficulty urinating and dysuria. Negative for flank pain, hematuria, nocturia and urgency.   Musculoskeletal: Negative for back pain, neck pain and neck stiffness.   Skin: Negative for rash and wound.   Neurological: Negative for dizziness and seizures.   Hematological: Negative for adenopathy. Does not bruise/bleed easily.   Psychiatric/Behavioral: Negative for confusion. The patient is not nervous/anxious.    All other systems reviewed and are negative.    Objective:     Temp:  [97.7 °F (36.5 °C)-98.5 °F (36.9 °C)] 98.2 °F (36.8 °C)  Pulse:  " [55-83] 55  Resp:  [15-19] 15  SpO2:  [94 %-100 %] 94 %  BP: ()/(55-82) 107/65     Body mass index is 27.41 kg/m².       Bladder Scan Volume (mL): 300 mL (12/17/18 0200)  Post Void Cath Residual (mL): 400 mL (12/17/18 0200)    Drains          None          Physical Exam   Vitals reviewed.  Constitutional: She is oriented to person, place, and time. She appears well-developed and well-nourished. No distress.   HENT:   Head: Normocephalic and atraumatic.   Neck: Normal range of motion.   Cardiovascular: Normal rate and regular rhythm.    Pulmonary/Chest: Effort normal and breath sounds normal. No respiratory distress.   Abdominal: Soft. She exhibits no distension and no mass. There is no tenderness. There is no rebound and no guarding.   Musculoskeletal: Normal range of motion.   Neurological: She is alert and oriented to person, place, and time.   Skin: Skin is warm and dry. No rash noted. She is not diaphoretic. No erythema.     Psychiatric: She has a normal mood and affect. Her behavior is normal.       Significant Labs:    BMP:  Recent Labs   Lab 12/15/18  0638 12/16/18  0534 12/17/18  0502    138 139   K 4.1 3.9 3.6    107 106   CO2 25 24 26   BUN 12 9 9   CREATININE 0.8 0.9 0.8   CALCIUM 8.2* 8.0* 8.4*       CBC:   Recent Labs   Lab 12/15/18  0638 12/16/18  0535 12/17/18  0502   WBC 5.66 4.50 4.76   HGB 10.8* 10.3* 10.8*   HCT 32.6* 30.9* 32.5*    178 180       Blood Culture:   Recent Labs   Lab 12/13/18  1922 12/13/18  1932   LABBLOO Gram stain brinda bottle: Gram negative rods  Results called to and read back by:colin HOROWITZ 12/15/2018  14:48 No Growth to date  No Growth to date  No Growth to date  No Growth to date     Urine Culture: No results for input(s): LABURIN in the last 168 hours.  Urine Studies:   Recent Labs   Lab 12/13/18  1731   COLORU Yellow   APPEARANCEUA Clear   PHUR 5.0   SPECGRAV 1.015   PROTEINUA Negative   GLUCUA Negative   KETONESU Negative   BILIRUBINUA  Negative   OCCULTUA 2+*   NITRITE Negative   UROBILINOGEN Negative   LEUKOCYTESUR Trace*   RBCUA 1   WBCUA 1   BACTERIA Rare   SQUAMEPITHEL 4       Significant Imaging:  All pertinent imaging results/findings from the past 24 hours have been reviewed.            Assessment/Plan:     Gross hematuria    Not clear if she actually had hematuria  CT urogram today  Cystoscopy as outpatient     Incomplete bladder emptying    Decrease Dilaudid and Bentyl if possible  Flomax added  Monitor PVR  I&O cath PRN  Reports this is a chronic issue     Interstitial cystitis     - Chronic issue   - History somewhat limited - no prior  records available. Has not seen urologist in a while. Prior treatment: Elmiron and other meds that were cost prohibitive.   - Taking Neurontin   - Reports she has appt in January with urologist - unsure who   - Likely benefit from outpatient pelvic floor PT and pain management   - Stop smoking - will exacerbate IC symptoms   - Avoid bladder irritants         VTE Risk Mitigation (From admission, onward)        Ordered     Place sequential compression device  Until discontinued      12/13/18 2059     IP VTE HIGH RISK PATIENT  Once      12/13/18 2058          Kirsty Acevedo MD  Urology  [facility]

## 2018-12-17 NOTE — ASSESSMENT & PLAN NOTE
- Chronic issue   - History somewhat limited - no prior  records available. Has not seen urologist in a while. Prior treatment: Elmiron and other meds that were cost prohibitive.   - Taking Neurontin   - Reports she has appt in January with urologist - unsure who   - Likely benefit from outpatient pelvic floor PT and pain management   - Stop smoking - will exacerbate IC symptoms   - Avoid bladder irritants

## 2018-12-17 NOTE — NURSING
Patient became upset when informed of diet was ordered and procedure was on hold. Asking many questions that I am unable to answer . Patient refused all meds unable she speaks to  Physican.Iv fluids also refused at this time

## 2018-12-18 LAB — BACTERIA BLD CULT: NORMAL

## 2018-12-18 NOTE — NURSING
Discharge instructions given to patient states that she understands instructions. No acute distress noted.Reviewed signs when to seek medical attentions. Awaiting wheelchair escort to car. Last bladder scan shown 153 cc post void. Denies Hematoma

## 2018-12-18 NOTE — PLAN OF CARE
Problem: Adult Inpatient Plan of Care  Goal: Plan of Care Review  Outcome: Ongoing (interventions implemented as appropriate)   12/16/18 1813 12/17/18 1852   Plan of Care Review   Plan of Care Reviewed With --  patient   Progress no change --    Awaiting transportation to home.Skin intact. No wounds noted. No falls or injuries. Self care with ADLs Extra fluids encouraged. Steady gait present ambulate up and down hallway

## 2018-12-19 ENCOUNTER — TELEPHONE (OUTPATIENT)
Dept: UROLOGY | Facility: CLINIC | Age: 28
End: 2018-12-19

## 2018-12-19 NOTE — TELEPHONE ENCOUNTER
----- Message from Margaret Salmeron sent at 12/19/2018  3:08 PM CST -----  Contact: Self   Pt is rt a call. [phone].

## 2018-12-19 NOTE — TELEPHONE ENCOUNTER
Patient states that she is in a lot of pain and is requesting to see the provider sooner- patient booked at [facility] to see provider- follow up apt can be made here at the [facility] location. Patient apt day and time given and directions to [facility] location given.

## 2018-12-26 ENCOUNTER — OFFICE VISIT (OUTPATIENT)
Dept: UROLOGY | Facility: CLINIC | Age: 28
End: 2018-12-26
Attending: UROLOGY
Payer: MEDICAID

## 2018-12-26 VITALS
WEIGHT: 164.69 LBS | HEIGHT: 65 IN | BODY MASS INDEX: 27.44 KG/M2 | SYSTOLIC BLOOD PRESSURE: 114 MMHG | DIASTOLIC BLOOD PRESSURE: 72 MMHG | HEART RATE: 81 BPM

## 2018-12-26 DIAGNOSIS — Z72.0 TOBACCO ABUSE: Chronic | ICD-10-CM

## 2018-12-26 DIAGNOSIS — R33.9 INCOMPLETE BLADDER EMPTYING: ICD-10-CM

## 2018-12-26 DIAGNOSIS — R31.0 GROSS HEMATURIA: ICD-10-CM

## 2018-12-26 DIAGNOSIS — N30.10 INTERSTITIAL CYSTITIS: Primary | ICD-10-CM

## 2018-12-26 DIAGNOSIS — R10.2 PELVIC PAIN: ICD-10-CM

## 2018-12-26 LAB
BILIRUB SERPL-MCNC: ABNORMAL MG/DL
BLOOD URINE, POC: 250
COLOR, POC UA: ABNORMAL
GLUCOSE UR QL STRIP: NORMAL
KETONES UR QL STRIP: ABNORMAL
LEUKOCYTE ESTERASE URINE, POC: ABNORMAL
NITRITE, POC UA: ABNORMAL
PH, POC UA: 5
POC RESIDUAL URINE VOLUME: 27 ML (ref 0–100)
PROTEIN, POC: + 30
SPECIFIC GRAVITY, POC UA: 1.03
UROBILINOGEN, POC UA: NORMAL

## 2018-12-26 PROCEDURE — 99214 OFFICE O/P EST MOD 30 MIN: CPT | Mod: 25,S$GLB,, | Performed by: UROLOGY

## 2018-12-26 PROCEDURE — 87186 SC STD MICRODIL/AGAR DIL: CPT

## 2018-12-26 PROCEDURE — 87077 CULTURE AEROBIC IDENTIFY: CPT

## 2018-12-26 PROCEDURE — 51798 US URINE CAPACITY MEASURE: CPT | Mod: S$GLB,,, | Performed by: UROLOGY

## 2018-12-26 PROCEDURE — 87088 URINE BACTERIA CULTURE: CPT

## 2018-12-26 PROCEDURE — 81002 URINALYSIS NONAUTO W/O SCOPE: CPT | Mod: S$GLB,,, | Performed by: UROLOGY

## 2018-12-26 PROCEDURE — 87086 URINE CULTURE/COLONY COUNT: CPT

## 2018-12-26 RX ORDER — CEPHALEXIN 500 MG/1
500 CAPSULE ORAL EVERY 8 HOURS
Qty: 30 CAPSULE | Refills: 0 | Status: ON HOLD | OUTPATIENT
Start: 2018-12-26 | End: 2019-01-04 | Stop reason: HOSPADM

## 2018-12-26 NOTE — PROGRESS NOTES
Subjective:       Crystal Tsai is a 28 y.o. female who is an established patient who was seen for evaluation of IC.      She was seen as inpatient consultation 12/16/18 by Dr Jc. She has diagnosis of IC (since age 17) and was seen by other urologists years ago. Records not available. She was reportedly scheduled to see another urologist in 1/19.     She was admitted for pain, possible perirectal abscess. Also noted to have ANN-MARIE issues requiring CIC occasionally. She reported that was a chronic issue. Flomax given in house. Possible gross hematuria while admitted.     Previously on Elmiron and other meds (unsure what) that were cost-prohibitive. On Neurontin. She continues to smoke.     She continues to mostly c/o pain. Some relief from heating pads. She is avoiding most bladder irritants. Pain worst after intercourse. She is limited fluids now due to concern for voiding issues.      No UCx done during recent hospitalization. UA concerning for UTI.     CT uro 12/18 - no delayed imaging done - distal ureters unable to be assessed. Radiologist concerned for possible ureteral stricture in R (unable to assess this with available imaging). Bladder/kidneys otherwise normal.     PVR (bladder scan) today - 27cc      The following portions of the patient's history were reviewed and updated as appropriate: allergies, current medications, past family history, past medical history, past social history, past surgical history and problem list.    Review of Systems  Constitutional: no fever or chills  ENT: no nasal congestion or sore throat  Respiratory: no cough or shortness of breath  Cardiovascular: no chest pain or palpitations  Gastrointestinal: no nausea or vomiting, tolerating diet  Genitourinary: as per HPI  Hematologic/Lymphatic: no easy bruising or lymphadenopathy  Musculoskeletal: no arthralgias or myalgias  Skin: no rashes or lesions  Neurological: no seizures or tremors  Behavioral/Psych: no auditory  "or visual hallucinations        Objective:    Vitals: /72 (BP Location: Left arm, Patient Position: Sitting, BP Method: Large (Automatic))   Pulse 81   Ht 5' 5" (1.651 m)   Wt 74.7 kg (164 lb 10.9 oz)   BMI 27.40 kg/m²     Physical Exam   General: well developed, well nourished in no acute distress  Head: normocephalic, atraumatic  Neck: supple, trachea midline, no obvious enlargement of thyroid  HEENT: EOMI, mucus membranes moist, sclera anicteric, no hearing impairment  Lungs: symmetric expansion, non-labored breathing  Cardiovascular: regular rate and rhythm, normal pulses  Abdomen: soft, non tender, non distended, no palpable masses, no hepatosplenomegaly, no hernias, no CVA tenderness  Musculoskeletal: no peripheral edema, normal ROM in bilateral upper and lower extremities  Lymphatics: no cervical or inguinal lymphadenopathy  Skin: no rashes or lesions  Neuro: alert and oriented x 3, no gross deficits  Psych: normal judgment and insight, normal mood/affect and non-anxious  Genitourinary:   patient declined exam      Lab Review   Urine analysis today in clinic shows positive for nitrites, leukocytes, red blood cells     Lab Results   Component Value Date    WBC 4.76 12/17/2018    HGB 10.8 (L) 12/17/2018    HCT 32.5 (L) 12/17/2018    MCV 92 12/17/2018     12/17/2018     Lab Results   Component Value Date    CREATININE 0.8 12/17/2018    BUN 9 12/17/2018       Imaging  Images and reports were personally reviewed by me and discussed with patient  CT reviewed       Assessment/Plan:      1. Interstitial cystitis    - Long history   - No prior notes available   - Hydrodistention at time of cysto/RPG. Discussed risk of worsening pain after procedure.   - UCx now. Empiric Keflex.      2. Incomplete bladder emptying    - Chronic   - PVR acceptable today     3. Gross hematuria    - CT uro done in hospital. No proper delayed images done.   - Cysto/RPG   - UCx today     4. Pelvic pain    - PFPT, pain " management?   - Likely benefit from PFPT - referral placed   - Avoid narcotics     5. Tobacco abuse    - Will exacerbate IC symptoms due to bladder irritation         Follow up in 3 weeks

## 2018-12-28 LAB — BACTERIA UR CULT: NORMAL

## 2018-12-30 ENCOUNTER — TELEPHONE (OUTPATIENT)
Dept: UROLOGY | Facility: HOSPITAL | Age: 28
End: 2018-12-30

## 2018-12-30 RX ORDER — SULFAMETHOXAZOLE AND TRIMETHOPRIM 800; 160 MG/1; MG/1
1 TABLET ORAL 2 TIMES DAILY
Qty: 28 TABLET | Refills: 0 | Status: SHIPPED | OUTPATIENT
Start: 2018-12-30 | End: 2019-01-13

## 2018-12-30 NOTE — TELEPHONE ENCOUNTER
Please inform patient of urinary tract infection on recent urine culture. Appropriate antibiotics (Bactrim) were sent in to the pharmacy.    Stop Keflex given at last appt - UTI is resistant.

## 2018-12-31 ENCOUNTER — HOSPITAL ENCOUNTER (OUTPATIENT)
Dept: PREADMISSION TESTING | Facility: HOSPITAL | Age: 28
Discharge: HOME | End: 2018-12-31
Attending: UROLOGY
Payer: MEDICAID

## 2018-12-31 VITALS — BODY MASS INDEX: 28.32 KG/M2 | WEIGHT: 170 LBS | HEIGHT: 65 IN

## 2018-12-31 DIAGNOSIS — Z01.818 PREOP TESTING: Primary | ICD-10-CM

## 2018-12-31 LAB — B-HCG UR QL: NEGATIVE

## 2018-12-31 PROCEDURE — 81025 URINE PREGNANCY TEST: CPT

## 2018-12-31 NOTE — DISCHARGE INSTRUCTIONS
" Your procedure  is scheduled for __1/4/2019________.    Call [phone] between 2pm and 5pm on __1/3/2019_____to find out your arrival time for the day of surgery.    Report to Same Day Surgery Unit at ____ AM on the 2nd floor of the hospital.  Use the front entrance of the hospital.  The front doors of the hospital open promptly at 5:30am.  If you need wheelchair assistance, call [phone] from your cell phone, or call "0" from the courtesy phone in the lobby.    Important instructions:   Do not eat or drink after 12 midnight, including water.  It is okay to brush your teeth.  Do not have gum, candy or mints.     Take only these medications with a small swallow of water on the morning of your surgery ___gabapentin, BCP, bentyl___________      Stop taking Aspirin, Ibuprofen, Motrin and Aleve , Fish oil, and Vitamin E for at least 7 days before your surgery. You may use Tylenol unless otherwise instructed by your doctor.           Please shower the night before and the morning of your surgery.         No shaving of procedural area at least 4-5 days before surgery due to increased risk of skin irritation and/or possible infection.      Female patients may be asked for a urine specimen on the morning of the surgery.  Please check with your nurse before using the restroom.     Do not wear make- up, including mascara.     You may wear deodorant only.      Do not wear powder, body lotion or perfume/cologne.     Do not wear any jewelry or have any metal on your body.     You will be asked to remove any dentures or partials for the procedure.     Please bring any documents given to you by your doctor.     If you are going home on the same day of surgery, you must arrange for a family member or a friend to drive you home.  Public transportation is prohibited.  You will not be able to drive home if you were given anesthesia or sedation.     Wear loose fitting clothes allowing for bandages.     Please leave money " and valuables home.       You may bring your cell phone.     Call the doctor if fever or illness should occur before your surgery.    Call [phone] to contact us here if needed.

## 2019-01-02 NOTE — TELEPHONE ENCOUNTER
It is not necessary to recheck her urine. She should still be on the antibiotics at the time of procedure.

## 2019-01-02 NOTE — TELEPHONE ENCOUNTER
Spoke with patient to inform her what was said in reference to her urine being rechecked and she was fine with that.. MARICARMEN

## 2019-01-04 ENCOUNTER — ANESTHESIA (OUTPATIENT)
Dept: SURGERY | Facility: HOSPITAL | Age: 29
End: 2019-01-04
Payer: MEDICAID

## 2019-01-04 ENCOUNTER — HOSPITAL ENCOUNTER (OUTPATIENT)
Dept: RADIOLOGY | Facility: HOSPITAL | Age: 29
Discharge: HOME | End: 2019-01-04
Attending: UROLOGY | Admitting: UROLOGY
Payer: MEDICAID

## 2019-01-04 ENCOUNTER — HOSPITAL ENCOUNTER (OUTPATIENT)
Facility: HOSPITAL | Age: 29
Discharge: HOME | End: 2019-01-04
Attending: UROLOGY | Admitting: UROLOGY
Payer: MEDICAID

## 2019-01-04 ENCOUNTER — ANESTHESIA EVENT (OUTPATIENT)
Dept: SURGERY | Facility: HOSPITAL | Age: 29
End: 2019-01-04
Payer: MEDICAID

## 2019-01-04 VITALS
RESPIRATION RATE: 16 BRPM | OXYGEN SATURATION: 100 % | TEMPERATURE: 98 F | HEIGHT: 65 IN | SYSTOLIC BLOOD PRESSURE: 119 MMHG | WEIGHT: 170 LBS | HEART RATE: 59 BPM | DIASTOLIC BLOOD PRESSURE: 63 MMHG | BODY MASS INDEX: 28.32 KG/M2

## 2019-01-04 DIAGNOSIS — R31.0 GROSS HEMATURIA: ICD-10-CM

## 2019-01-04 DIAGNOSIS — N30.10 INTERSTITIAL CYSTITIS: Primary | ICD-10-CM

## 2019-01-04 PROCEDURE — 00910 ANES TRANSURETHRAL PX NOS: CPT | Performed by: UROLOGY

## 2019-01-04 PROCEDURE — 25000003 PHARM REV CODE 250: Performed by: UROLOGY

## 2019-01-04 PROCEDURE — 25500020 PHARM REV CODE 255: Performed by: UROLOGY

## 2019-01-04 PROCEDURE — D9220A PRA ANESTHESIA: Mod: ANES,,, | Performed by: ANESTHESIOLOGY

## 2019-01-04 PROCEDURE — D9220A PRA ANESTHESIA: ICD-10-PCS | Mod: ANES,,, | Performed by: ANESTHESIOLOGY

## 2019-01-04 PROCEDURE — D9220A PRA ANESTHESIA: Mod: CRNA,,, | Performed by: REGISTERED NURSE

## 2019-01-04 PROCEDURE — 63600175 PHARM REV CODE 636 W HCPCS: Performed by: REGISTERED NURSE

## 2019-01-04 PROCEDURE — 25000003 PHARM REV CODE 250: Performed by: REGISTERED NURSE

## 2019-01-04 PROCEDURE — 74420 PR  X-RAY RETROGRADE PYELOGRAM: ICD-10-PCS | Mod: 26,,, | Performed by: UROLOGY

## 2019-01-04 PROCEDURE — 63600175 PHARM REV CODE 636 W HCPCS: Performed by: UROLOGY

## 2019-01-04 PROCEDURE — 74420 UROGRAPHY RTRGR +-KUB: CPT | Mod: 26,,, | Performed by: UROLOGY

## 2019-01-04 PROCEDURE — 52260 CYSTOSCOPY AND TREATMENT: CPT | Mod: ,,, | Performed by: UROLOGY

## 2019-01-04 PROCEDURE — 52260 PR CYSTOSCOPY,DIL BLADDER,GEN ANESTH: ICD-10-PCS | Mod: ,,, | Performed by: UROLOGY

## 2019-01-04 PROCEDURE — D9220A PRA ANESTHESIA: ICD-10-PCS | Mod: CRNA,,, | Performed by: REGISTERED NURSE

## 2019-01-04 PROCEDURE — 37000008 HC ANESTHESIA 1ST 15 MINUTES: Performed by: UROLOGY

## 2019-01-04 PROCEDURE — 76000 FLUOROSCOPY <1 HR PHYS/QHP: CPT | Mod: TC

## 2019-01-04 PROCEDURE — 37000009 HC ANESTHESIA EA ADD 15 MINS: Performed by: UROLOGY

## 2019-01-04 PROCEDURE — 71000016 HC POSTOP RECOV ADDL HR: Performed by: UROLOGY

## 2019-01-04 PROCEDURE — 36000706: Performed by: UROLOGY

## 2019-01-04 PROCEDURE — 36000707: Performed by: UROLOGY

## 2019-01-04 PROCEDURE — C1758 CATHETER, URETERAL: HCPCS | Performed by: UROLOGY

## 2019-01-04 PROCEDURE — 25000003 PHARM REV CODE 250: Performed by: ANESTHESIOLOGY

## 2019-01-04 PROCEDURE — 71000015 HC POSTOP RECOV 1ST HR: Performed by: UROLOGY

## 2019-01-04 PROCEDURE — 71000033 HC RECOVERY, INTIAL HOUR: Performed by: UROLOGY

## 2019-01-04 RX ORDER — SODIUM CHLORIDE 0.9 % (FLUSH) 0.9 %
3 SYRINGE (ML) INJECTION
Status: DISCONTINUED | OUTPATIENT
Start: 2019-01-04 | End: 2019-01-04 | Stop reason: HOSPADM

## 2019-01-04 RX ORDER — HYDROMORPHONE HYDROCHLORIDE 2 MG/ML
0.2 INJECTION, SOLUTION INTRAMUSCULAR; INTRAVENOUS; SUBCUTANEOUS EVERY 5 MIN PRN
Status: DISCONTINUED | OUTPATIENT
Start: 2019-01-04 | End: 2019-01-04 | Stop reason: HOSPADM

## 2019-01-04 RX ORDER — HYDROCODONE BITARTRATE AND ACETAMINOPHEN 5; 325 MG/1; MG/1
1 TABLET ORAL EVERY 4 HOURS PRN
Status: DISCONTINUED | OUTPATIENT
Start: 2019-01-04 | End: 2019-01-04 | Stop reason: HOSPADM

## 2019-01-04 RX ORDER — LIDOCAINE HCL/PF 100 MG/5ML
SYRINGE (ML) INTRAVENOUS
Status: DISCONTINUED | OUTPATIENT
Start: 2019-01-04 | End: 2019-01-04

## 2019-01-04 RX ORDER — LORAZEPAM 2 MG/ML
0.25 INJECTION INTRAMUSCULAR ONCE AS NEEDED
Status: DISCONTINUED | OUTPATIENT
Start: 2019-01-04 | End: 2019-01-04 | Stop reason: HOSPADM

## 2019-01-04 RX ORDER — SODIUM CHLORIDE, SODIUM LACTATE, POTASSIUM CHLORIDE, CALCIUM CHLORIDE 600; 310; 30; 20 MG/100ML; MG/100ML; MG/100ML; MG/100ML
INJECTION, SOLUTION INTRAVENOUS CONTINUOUS
Status: DISCONTINUED | OUTPATIENT
Start: 2019-01-04 | End: 2019-01-04 | Stop reason: HOSPADM

## 2019-01-04 RX ORDER — MIDAZOLAM HYDROCHLORIDE 1 MG/ML
INJECTION, SOLUTION INTRAMUSCULAR; INTRAVENOUS
Status: DISCONTINUED | OUTPATIENT
Start: 2019-01-04 | End: 2019-01-04

## 2019-01-04 RX ORDER — PHENAZOPYRIDINE HYDROCHLORIDE 100 MG/1
200 TABLET, FILM COATED ORAL ONCE
Status: COMPLETED | OUTPATIENT
Start: 2019-01-04 | End: 2019-01-04

## 2019-01-04 RX ORDER — CEFAZOLIN SODIUM 2 G/50ML
2 SOLUTION INTRAVENOUS
Status: DISCONTINUED | OUTPATIENT
Start: 2019-01-04 | End: 2019-01-04

## 2019-01-04 RX ORDER — METOCLOPRAMIDE HYDROCHLORIDE 5 MG/ML
INJECTION INTRAMUSCULAR; INTRAVENOUS
Status: DISCONTINUED | OUTPATIENT
Start: 2019-01-04 | End: 2019-01-04

## 2019-01-04 RX ORDER — HYDROCODONE BITARTRATE AND ACETAMINOPHEN 5; 325 MG/1; MG/1
1 TABLET ORAL EVERY 6 HOURS PRN
Qty: 10 TABLET | Refills: 0 | Status: SHIPPED | OUTPATIENT
Start: 2019-01-04 | End: 2019-05-20

## 2019-01-04 RX ORDER — PHENAZOPYRIDINE HYDROCHLORIDE 100 MG/1
200 TABLET, FILM COATED ORAL 3 TIMES DAILY PRN
Status: DISCONTINUED | OUTPATIENT
Start: 2019-01-04 | End: 2019-01-04 | Stop reason: HOSPADM

## 2019-01-04 RX ORDER — ONDANSETRON 2 MG/ML
4 INJECTION INTRAMUSCULAR; INTRAVENOUS EVERY 12 HOURS PRN
Status: DISCONTINUED | OUTPATIENT
Start: 2019-01-04 | End: 2019-01-04 | Stop reason: HOSPADM

## 2019-01-04 RX ORDER — PROPOFOL 10 MG/ML
VIAL (ML) INTRAVENOUS
Status: DISCONTINUED | OUTPATIENT
Start: 2019-01-04 | End: 2019-01-04

## 2019-01-04 RX ORDER — PHENYLEPHRINE HYDROCHLORIDE 10 MG/ML
INJECTION INTRAVENOUS
Status: DISCONTINUED | OUTPATIENT
Start: 2019-01-04 | End: 2019-01-04

## 2019-01-04 RX ORDER — CIPROFLOXACIN 2 MG/ML
400 INJECTION, SOLUTION INTRAVENOUS ONCE
Status: COMPLETED | OUTPATIENT
Start: 2019-01-04 | End: 2019-01-04

## 2019-01-04 RX ORDER — FENTANYL CITRATE 50 UG/ML
INJECTION, SOLUTION INTRAMUSCULAR; INTRAVENOUS
Status: DISCONTINUED | OUTPATIENT
Start: 2019-01-04 | End: 2019-01-04

## 2019-01-04 RX ORDER — ROCURONIUM BROMIDE 10 MG/ML
INJECTION, SOLUTION INTRAVENOUS
Status: DISCONTINUED | OUTPATIENT
Start: 2019-01-04 | End: 2019-01-04

## 2019-01-04 RX ORDER — PHENAZOPYRIDINE HYDROCHLORIDE 100 MG/1
200 TABLET, FILM COATED ORAL
Qty: 18 TABLET | Refills: 0 | Status: SHIPPED | OUTPATIENT
Start: 2019-01-04 | End: 2019-08-01 | Stop reason: SDUPTHER

## 2019-01-04 RX ORDER — ACETAMINOPHEN 10 MG/ML
INJECTION, SOLUTION INTRAVENOUS
Status: DISCONTINUED | OUTPATIENT
Start: 2019-01-04 | End: 2019-01-04

## 2019-01-04 RX ORDER — SUCCINYLCHOLINE CHLORIDE 20 MG/ML
INJECTION INTRAMUSCULAR; INTRAVENOUS
Status: DISCONTINUED | OUTPATIENT
Start: 2019-01-04 | End: 2019-01-04

## 2019-01-04 RX ORDER — ACETAMINOPHEN 325 MG/1
650 TABLET ORAL EVERY 4 HOURS PRN
Status: DISCONTINUED | OUTPATIENT
Start: 2019-01-04 | End: 2019-01-04 | Stop reason: HOSPADM

## 2019-01-04 RX ADMIN — SUCCINYLCHOLINE CHLORIDE 120 MG: 20 INJECTION, SOLUTION INTRAMUSCULAR; INTRAVENOUS at 10:01

## 2019-01-04 RX ADMIN — CIPROFLOXACIN 400 MG: 2 INJECTION, SOLUTION INTRAVENOUS at 10:01

## 2019-01-04 RX ADMIN — PROPOFOL 180 MG: 10 INJECTION, EMULSION INTRAVENOUS at 10:01

## 2019-01-04 RX ADMIN — PHENAZOPYRIDINE HYDROCHLORIDE 200 MG: 100 TABLET ORAL at 11:01

## 2019-01-04 RX ADMIN — PHENYLEPHRINE HYDROCHLORIDE 100 MCG: 10 INJECTION INTRAVENOUS at 11:01

## 2019-01-04 RX ADMIN — HYDROCODONE BITARTRATE AND ACETAMINOPHEN 1 TABLET: 5; 325 TABLET ORAL at 12:01

## 2019-01-04 RX ADMIN — LIDOCAINE HYDROCHLORIDE 100 MG: 20 INJECTION, SOLUTION INTRAVENOUS at 10:01

## 2019-01-04 RX ADMIN — FENTANYL CITRATE 100 MCG: 50 INJECTION INTRAMUSCULAR; INTRAVENOUS at 10:01

## 2019-01-04 RX ADMIN — MIDAZOLAM HYDROCHLORIDE 2 MG: 1 INJECTION, SOLUTION INTRAMUSCULAR; INTRAVENOUS at 10:01

## 2019-01-04 RX ADMIN — ACETAMINOPHEN 1000 MG: 10 INJECTION, SOLUTION INTRAVENOUS at 11:01

## 2019-01-04 RX ADMIN — METOCLOPRAMIDE 10 MG: 5 INJECTION, SOLUTION INTRAMUSCULAR; INTRAVENOUS at 11:01

## 2019-01-04 RX ADMIN — SODIUM CHLORIDE, SODIUM LACTATE, POTASSIUM CHLORIDE, AND CALCIUM CHLORIDE: .6; .31; .03; .02 INJECTION, SOLUTION INTRAVENOUS at 08:01

## 2019-01-04 RX ADMIN — ROCURONIUM BROMIDE 10 MG: 10 INJECTION, SOLUTION INTRAVENOUS at 11:01

## 2019-01-04 RX ADMIN — HYDROCODONE BITARTRATE AND ACETAMINOPHEN 1 TABLET: 5; 325 TABLET ORAL at 11:01

## 2019-01-04 NOTE — INTERVAL H&P NOTE
The patient has been examined and the H&P has been reviewed:    I concur with the findings and no changes have occurred since H&P was written.    Anesthesia/Surgery risks, benefits and alternative options discussed and understood by patient/family.          Active Hospital Problems    Diagnosis  POA    Interstitial cystitis [N30.10]  Yes      Resolved Hospital Problems   No resolved problems to display.

## 2019-01-04 NOTE — DISCHARGE INSTRUCTIONS
ACTIVITY LEVEL: If you have received sedation or an anesthetic, you may feel sleepy for several hours. Rest until you are more awake. Gradually resume your normal activities.       DIET: You may resume your home diet. If nausea is present, increase your diet gradually with fluids and bland foods.      Medications: Pain medication should be taken only if needed and as directed. If antibiotics are prescribed, the medication should be taken until completed. You will be given an updated list of you medications.  ? No driving, alcoholic beverages or signing legal documents for next 24 hours or while taking pain medication       last pain pill was given at 12;15 pm         CALL THE DOCTOR:       · Fever over 101°F  · Severe pain that doesnt go away with medication.  · Upset stomach and vomiting that is persistent.  · Problems urinating-unable to urinate or heavy bleeding (with or without clots)

## 2019-01-04 NOTE — TRANSFER OF CARE
"Anesthesia Transfer of Care Note    Patient: Crystal Tsai    Procedure(s) Performed: Procedure(s) (LRB):  CYSTOSCOPY, WITH RETROGRADE PYELOGRAM; hydrodistention (Bilateral)    Patient location: PACU    Anesthesia Type: general    Transport from OR: Transported from OR on room air with adequate spontaneous ventilation    Post pain: adequate analgesia    Post assessment: no apparent anesthetic complications and tolerated procedure well    Post vital signs: stable    Level of consciousness: awake, alert and oriented    Nausea/Vomiting: no nausea/vomiting    Complications: none    Transfer of care protocol was followed      Last vitals:   Visit Vitals  BP (!) 109/57   Pulse 94   Temp 36.6 °C (97.9 °F) (Oral)   Resp (!) 24   Ht 5' 5" (1.651 m)   Wt 77.1 kg (170 lb)   SpO2 100%   Breastfeeding? No   BMI 28.29 kg/m²     "

## 2019-01-04 NOTE — OP NOTE
DATE OF PROCEDURE:  01/04/2019.    SURGEON:  Kirsty Acevedo M.D.    PREOPERATIVE DIAGNOSES:  Gross hematuria, interstitial cystitis.    POSTOPERATIVE DIAGNOSES:  Gross hematuria, interstitial cystitis.    PROCEDURES PERFORMED:  Cystoscopy with bilateral retrograde pyelograms and   hydrodistention.    INDICATIONS FOR PROCEDURE:  Ms. Tsai is a 28-year-old female who has been   previously diagnosed with interstitial cystitis by an outside provider.  She   presented to the Emergency Department, was admitted for possible perirectal   abscess as well as bladder pain.  She presented to outpatient clinic with   urinary tract infection and concern for possible ureteral strictures that were   noted on CT scan.  For this reason, she was recommended to undergo cystoscopy   with bilateral retrograde pyelogram.  She also elected to undergo   hydrodistention for interstitial cystitis.  She understands risks of no   improvement versus worsening of her bladder pain after hydrodistention.    DESCRIPTION OF PROCEDURE:  Ms. Tsai was brought to the Operating Room and   placed under general LMA anesthesia.  Full timeout procedures were performed   identifying the correct patient and procedure.  Appropriate IV antibiotics with   ciprofloxacin was given prior to commencement of surgery.  The patient was   placed in dorsal lithotomy position and prepped and draped in the usual sterile   fashion.    A 22-Bangladeshi rigid cystoscope was passed per urethra and into the bladder.  Full   cystoscopy was performed, which showed no abnormalities throughout the entirety   of the bladder.  Bilateral ureteral orifices were able to be easily identified   and were in their orthotopic position.  The left ureteral orifice was cannulated   with a cone-tipped ureteral catheter and a gentle retrograde pyelogram was   performed, which showed normal retrograde pyelogram throughout the entire of the   collecting system.  No filling defects or strictures  were noted throughout.    Contrast noted to drain without issue.  Similar procedure was performed on the   patient's contralateral side and again showed a normal retrograde pyelogram   without filling defects, hydronephrosis or evidence of any ureteral stricture.    Contrast drained briskly.    Next, I proceeded with hydrodistention.  The bladder was instilled with saline.    About 50 cm of water into the bladder was appropriately distended.  This was   held for 5 minutes.  After this, the bladder was drained and was noted to have a   bladder capacity of 1100 mL.  Cystoscopy was again performed, which did not   show Hunner's lesions or significant petechiae after hydrodistention.  Bladder   was then drained in its entirety and the cystoscope was removed.    The patient was awakened from general anesthesia and transferred to PACU in   stable condition.    COMPLICATIONS:  None.    FINDINGS:  Normal cystoscopy, normal retrograde pyelogram.  No significant   ulceration or significant petechiae after hydrodistention.  Bladder capacity of   1100 mL.    SPECIMENS:  None.    DRAINS:  None.    ESTIMATED BLOOD LOSS:  Less than 5 mL.    PATIENT DISPOSITION:  The patient is stable and deemed appropriate for discharge   home.  She will follow up in approximately 3 weeks for postoperative check.      EKP/IN  dd: 01/04/2019 11:31:13 (CST)  td: 01/04/2019 14:02:24 (CST)  Doc ID   #[id number]  Job ID #[id number]    CC:

## 2019-01-04 NOTE — BRIEF OP NOTE
[facility]  Brief Operative Note     SUMMARY     Surgery Date: 1/4/2019     Surgeon(s) and Role:     * Kirsty Acevedo MD - Primary    Assisting Surgeon: None    Pre-op Diagnosis:  Interstitial cystitis [N30.10]  Gross hematuria [R31.0]    Post-op Diagnosis:  Post-Op Diagnosis Codes:     * Interstitial cystitis [N30.10]     * Gross hematuria [R31.0]    Procedure(s) (LRB):  CYSTOSCOPY  BILATERAL RETROGRADE PYELOGRAM  HYDRODISTENTION     Anesthesia: Choice    Description of the findings of the procedure: Normal cystoscopy. Normal b/l RPG - no ureteral stricture.    Findings/Key Components: 1100 bladder capacity after hydrodistention. No Hunner's ulcers or significant petechiae noted post-distention.     Estimated Blood Loss: <5cc         Specimens:   Specimen (12h ago, onward)    None          Discharge Note    SUMMARY     Admit Date: 1/4/2019    Discharge Date and Time:  01/04/2019 10:19 AM    Hospital Course (synopsis of major diagnoses, care, treatment, and services provided during the course of the hospital stay): Uncomplicated cystoscopic procedure     Final Diagnosis: Post-Op Diagnosis Codes:     * Interstitial cystitis [N30.10]     * Gross hematuria [R31.0]    Disposition: Home or Self Care    Follow Up/Patient Instructions:     Medications:  Reconciled Home Medications:      Medication List      START taking these medications    HYDROcodone-acetaminophen 5-325 mg per tablet  Commonly known as:  NORCO  Take 1 tablet by mouth every 6 (six) hours as needed for Pain.     phenazopyridine 100 MG tablet  Commonly known as:  PYRIDIUM  Take 2 tablets (200 mg total) by mouth 3 (three) times daily with meals.        CHANGE how you take these medications    * methen-m.blue-s.phos-phsal-hyo 118-10-40.8-36 mg Cap  Commonly known as:  URIBEL  Take 1 capsule by mouth 3 (three) times daily as needed (bladder pain).  What changed:  Another medication with the same name was added. Make sure you  understand how and when to take each.     * methen-m.blue-sHayleephos-phsal-hyo 118-10-40.8-36 mg Cap  Commonly known as:  URIBEL  Take 1 capsule by mouth 3 (three) times daily as needed (bladder pain).  What changed:  You were already taking a medication with the same name, and this prescription was added. Make sure you understand how and when to take each.         * This list has 2 medication(s) that are the same as other medications prescribed for you. Read the directions carefully, and ask your doctor or other care provider to review them with you.            CONTINUE taking these medications    dicyclomine 20 mg tablet  Commonly known as:  BENTYL  Take 1 tablet (20 mg total) by mouth 3 (three) times daily as needed.     gabapentin 300 MG capsule  Commonly known as:  NEURONTIN  Take 2 capsules (600 mg total) by mouth 3 (three) times daily.     ibuprofen 600 MG tablet  Commonly known as:  ADVIL,MOTRIN  Take 1 tablet (600 mg total) by mouth 3 (three) times daily.     sulfamethoxazole-trimethoprim 800-160mg 800-160 mg Tab  Commonly known as:  BACTRIM DS  Take 1 tablet by mouth 2 (two) times daily. for 14 days     tamsulosin 0.4 mg Cap  Commonly known as:  FLOMAX  Take 1 capsule (0.4 mg total) by mouth once daily.     VYFEMLA (28) 0.4-35 mg-mcg per tablet  Generic drug:  norethindrone-ethinyl estradiol  TAKE 1 TABLET BY MOUTH EVERY DAY        STOP taking these medications    cephALEXin 500 MG capsule  Commonly known as:  KEFLEX          Discharge Procedure Orders   Diet general     Call MD for:  temperature >100.4     Call MD for:  persistent nausea and vomiting     Call MD for:  severe uncontrolled pain     Call MD for:  difficulty breathing, headache or visual disturbances     No dressing needed     Activity as tolerated     Follow-up Information     Kirsty Acevedo MD In 3 weeks.    Specialty:  Urology  Why:  For post-op follow up  Contact information:  [address]  [address]  [place]  [id number]  [phone]

## 2019-01-04 NOTE — ANESTHESIA POSTPROCEDURE EVALUATION
"Anesthesia Post Evaluation    Patient: Crystal Tsai    Procedure(s) Performed: Procedure(s) (LRB):  CYSTOSCOPY, WITH RETROGRADE PYELOGRAM; hydrodistention (Bilateral)    Final Anesthesia Type: general  Patient location during evaluation: PACU  Patient participation: Yes- Able to Participate  Level of consciousness: awake and alert and oriented  Post-procedure vital signs: reviewed and stable  Pain management: adequate  Airway patency: patent  PONV status at discharge: No PONV  Anesthetic complications: no      Cardiovascular status: hemodynamically stable  Respiratory status: unassisted, spontaneous ventilation and room air  Hydration status: euvolemic  Follow-up not needed.        Visit Vitals  BP (!) 127/56   Pulse 67   Temp 36.5 °C (97.7 °F) (Oral)   Resp 17   Ht 5' 5" (1.651 m)   Wt 77.1 kg (170 lb)   SpO2 99%   Breastfeeding? No   BMI 28.29 kg/m²       Pain/Karen Score: Pain Rating Prior to Med Admin: 7 (1/4/2019 12:16 PM)  Karen Score: 10 (1/4/2019 11:45 AM)        "

## 2019-01-04 NOTE — ANESTHESIA PREPROCEDURE EVALUATION
01/04/2019  Crystal Tsai is a 28 y.o., female.    Anesthesia Evaluation    I have reviewed the Patient Summary Reports.    I have reviewed the Nursing Notes.   I have reviewed the Medications.     Review of Systems  Anesthesia Hx:  No problems with previous Anesthesia  History of prior surgery of interest to airway management or planning: Previous anesthesia: General Denies Family Hx of Anesthesia complications.   Denies Personal Hx of Anesthesia complications.   Social:  Smoker Marijuana   Hematology/Oncology:  Hematology Normal   Oncology Normal     EENT/Dental:EENT/Dental Normal   Cardiovascular:  Cardiovascular Normal Exercise tolerance: good     Pulmonary:  Pulmonary Normal    Renal/:   Chronic Renal Disease, ARF renal calculi    Hepatic/GI:  Hepatic/GI Normal    Musculoskeletal:  Musculoskeletal Normal    Neurological:   Headaches Migraine   Endocrine:  Endocrine Normal    Dermatological:  Skin Normal    Psych:  Psychiatric Normal           Physical Exam  General:  Well nourished    Airway/Jaw/Neck:  Airway Findings: Mouth Opening: Normal Tongue: Normal  General Airway Assessment: Adult, Average  Mallampati: II  Improves to I with phonation.  TM Distance: Normal, at least 6 cm        Eyes/Ears/Nose:  EYES/EARS/NOSE FINDINGS: Normal   Dental:  Dental Findings: Periodontal disease, Mild   Chest/Lungs:  Chest/Lungs Findings: Clear to auscultation, Normal Respiratory Rate     Heart/Vascular:  Heart Findings: Rate: Normal  Rhythm: Regular Rhythm  Sounds: Normal  Heart murmur: negative Vascular Findings: Normal    Abdomen:  Abdomen Findings: Normal    Musculoskeletal:  Musculoskeletal Findings: Normal   Skin:  Skin Findings: Normal    Mental Status:  Mental Status Findings:  Cooperative, Alert and Oriented         Anesthesia Plan  Type of Anesthesia, risks & benefits discussed:  Anesthesia Type:   general  Patient's Preference:   Intra-op Monitoring Plan: standard ASA monitors  Intra-op Monitoring Plan Comments:   Post Op Pain Control Plan:   Post Op Pain Control Plan Comments:   Induction:   IV  Beta Blocker:  Patient is not currently on a Beta-Blocker (No further documentation required).       Informed Consent: Patient understands risks and agrees with Anesthesia plan.  Questions answered. Anesthesia consent signed with patient.  ASA Score: 2     Day of Surgery Review of History & Physical:            Ready For Surgery From Anesthesia Perspective.

## 2019-01-06 ENCOUNTER — NURSE TRIAGE (OUTPATIENT)
Dept: ADMINISTRATIVE | Facility: CLINIC | Age: 29
End: 2019-01-06

## 2019-01-06 NOTE — TELEPHONE ENCOUNTER
Reason for Disposition   SEVERE vaginal bleeding (i.e., soaking 2 pads or tampons per hour and present 2 or more hours)    Protocols used: ST VAGINAL BLEEDING - [id number]    Pt had cystoscope done on 1/4. Starting yesterday she has had severe vaginal bleeding. Reports some dizziness and fatigue as well. Advised ER. Contact patient to further advise

## 2019-01-14 ENCOUNTER — OFFICE VISIT (OUTPATIENT)
Dept: UROLOGY | Facility: CLINIC | Age: 29
End: 2019-01-14
Payer: MEDICAID

## 2019-01-14 ENCOUNTER — TELEPHONE (OUTPATIENT)
Dept: UROLOGY | Facility: CLINIC | Age: 29
End: 2019-01-14

## 2019-01-14 VITALS
SYSTOLIC BLOOD PRESSURE: 112 MMHG | DIASTOLIC BLOOD PRESSURE: 60 MMHG | HEIGHT: 65 IN | WEIGHT: 172.31 LBS | BODY MASS INDEX: 28.71 KG/M2

## 2019-01-14 DIAGNOSIS — R10.2 PELVIC PAIN: ICD-10-CM

## 2019-01-14 DIAGNOSIS — N30.00 ACUTE CYSTITIS WITHOUT HEMATURIA: ICD-10-CM

## 2019-01-14 DIAGNOSIS — R33.9 INCOMPLETE BLADDER EMPTYING: ICD-10-CM

## 2019-01-14 DIAGNOSIS — N30.10 INTERSTITIAL CYSTITIS: Primary | ICD-10-CM

## 2019-01-14 DIAGNOSIS — R31.0 GROSS HEMATURIA: ICD-10-CM

## 2019-01-14 PROCEDURE — 99214 OFFICE O/P EST MOD 30 MIN: CPT | Mod: S$PBB,,, | Performed by: NURSE PRACTITIONER

## 2019-01-14 PROCEDURE — 99214 OFFICE O/P EST MOD 30 MIN: CPT | Mod: PBBFAC | Performed by: NURSE PRACTITIONER

## 2019-01-14 PROCEDURE — 81001 URINALYSIS AUTO W/SCOPE: CPT | Mod: PBBFAC | Performed by: NURSE PRACTITIONER

## 2019-01-14 PROCEDURE — 99999 PR PBB SHADOW E&M-EST. PATIENT-LVL IV: ICD-10-PCS | Mod: PBBFAC,,, | Performed by: NURSE PRACTITIONER

## 2019-01-14 PROCEDURE — 99214 PR OFFICE/OUTPT VISIT, EST, LEVL IV, 30-39 MIN: ICD-10-PCS | Mod: S$PBB,,, | Performed by: NURSE PRACTITIONER

## 2019-01-14 PROCEDURE — 87086 URINE CULTURE/COLONY COUNT: CPT

## 2019-01-14 PROCEDURE — 51798 US URINE CAPACITY MEASURE: CPT | Mod: PBBFAC | Performed by: NURSE PRACTITIONER

## 2019-01-14 PROCEDURE — 99999 PR PBB SHADOW E&M-EST. PATIENT-LVL IV: CPT | Mod: PBBFAC,,, | Performed by: NURSE PRACTITIONER

## 2019-01-14 RX ORDER — TAMSULOSIN HYDROCHLORIDE 0.4 MG/1
0.4 CAPSULE ORAL 2 TIMES DAILY
Qty: 60 CAPSULE | Refills: 11 | Status: SHIPPED | OUTPATIENT
Start: 2019-01-14 | End: 2019-05-20

## 2019-01-14 NOTE — TELEPHONE ENCOUNTER
Spoke to patient and a appt. Was fabio for her to come in today.. She was pleased with that.. ARAVIND.

## 2019-01-14 NOTE — TELEPHONE ENCOUNTER
----- Message from Sisi Pepe sent at 1/14/2019  9:41 AM CST -----  Contact: self  Pt calling to schedule her hospital f/u. Please call [phone]

## 2019-01-14 NOTE — PROGRESS NOTES
Subjective:       Patient ID: Crystal Tsai is a 28 y.o. female who is an established patient of Dr. Acevedo though new to me was last seen in this office 12/26/18    Chief Complaint:   Chief Complaint   Patient presents with    Follow-up     Patient states she bled alot after procedure.. 3 days after she stopped bleeding.. since than every other day she see's blood clot and she has severe back pain     Per Dr. Acevedo:  She was seen as inpatient consultation 12/16/18 by Dr Jc. She has diagnosis of IC (since age 17) and was seen by other urologists years ago. Records not available. She was reportedly scheduled to see another urologist in 1/19.     She was admitted for pain, possible perirectal abscess. Also noted to have ANN-MARIE issues requiring CIC occasionally. She reported that was a chronic issue. Flomax given in house. Possible gross hematuria while admitted.     Previously on Elmiron and other meds (unsure what) that were cost-prohibitive. On Neurontin. She continues to smoke.     She continues to mostly c/o pain. Some relief from heating pads. She is avoiding most bladder irritants. Pain worst after intercourse. She is limited fluids now due to concern for voiding issues.      No UCx done during recent hospitalization. UA concerning for UTI.     CT uro 12/18 - no delayed imaging done - distal ureters unable to be assessed. Radiologist concerned for possible ureteral stricture in R (unable to assess this with available imaging). Bladder/kidneys otherwise normal.     PVR (bladder scan) last visit - 27cc    She was treated with Bactrim for UTI in 12/2018 by Dr. Acevedo. She is now s/p cystoscopy with bilateral RPG on 1/4/19 as part of hematuria workup. Cysto/RPG--normal. She also underwent hydrodistention at that time. She is here today for follow up. She had some hematuria for about 3 days after her procedure--this is improving. Still with c/o pelvic pain--though not worsening. She has not tried  Uribel at this time. She is currently taking Flomax. Denies dysuria or straining to void    PVR (bladder scan) today - 307 ml patient voided again, repeat PVR--158 ml    ACTIVE MEDICAL ISSUES:  Patient Active Problem List   Diagnosis    Interstitial cystitis    Cellulitis of left thumb    Left ovarian cyst    Pelvic pain    Acute cystitis    Acute renal failure    Hypokalemia    Tobacco abuse    Incomplete bladder emptying    Gross hematuria       ALLERGIES AND MEDICATIONS: updated and reviewed.  Review of patient's allergies indicates:  No Known Allergies  Current Outpatient Medications   Medication Sig    dicyclomine (BENTYL) 20 mg tablet Take 1 tablet (20 mg total) by mouth 3 (three) times daily as needed.    gabapentin (NEURONTIN) 300 MG capsule Take 2 capsules (600 mg total) by mouth 3 (three) times daily.    HYDROcodone-acetaminophen (NORCO) 5-325 mg per tablet Take 1 tablet by mouth every 6 (six) hours as needed for Pain.    ibuprofen (ADVIL,MOTRIN) 600 MG tablet Take 1 tablet (600 mg total) by mouth 3 (three) times daily.    methen-m.blue-s.phos-phsal-hyo (URIBEL) 118-10-40.8-36 mg Cap Take 1 capsule by mouth 3 (three) times daily as needed (bladder pain).    phenazopyridine (PYRIDIUM) 100 MG tablet Take 2 tablets (200 mg total) by mouth 3 (three) times daily with meals.    tamsulosin (FLOMAX) 0.4 mg Cap Take 1 capsule (0.4 mg total) by mouth 2 (two) times daily.    VYFEMLA, 28, 0.4-35 mg-mcg per tablet TAKE 1 TABLET BY MOUTH EVERY DAY     No current facility-administered medications for this visit.        Review of Systems   Constitutional: Negative for activity change, chills, fatigue, fever and unexpected weight change.   Eyes: Negative for discharge, redness and visual disturbance.   Respiratory: Negative for cough, shortness of breath and wheezing.    Cardiovascular: Negative for chest pain and leg swelling.   Gastrointestinal: Negative for abdominal distention, abdominal pain,  "constipation, diarrhea, nausea and vomiting.   Genitourinary: Positive for pelvic pain. Negative for decreased urine volume, difficulty urinating, dysuria, flank pain, frequency, hematuria and urgency.   Musculoskeletal: Negative for arthralgias, joint swelling and myalgias.   Skin: Negative for color change and rash.   Neurological: Negative for dizziness and light-headedness.   Psychiatric/Behavioral: Negative for behavioral problems and confusion. The patient is not nervous/anxious.        Objective:      Vitals:    01/14/19 1429   BP: 112/60   Weight: 78.1 kg (172 lb 4.6 oz)   Height: 5' 5" (1.651 m)     Physical Exam   Constitutional: She is oriented to person, place, and time. She appears well-developed.   HENT:   Head: Normocephalic and atraumatic.   Nose: Nose normal.   Eyes: Conjunctivae are normal. Right eye exhibits no discharge. Left eye exhibits no discharge.   Neck: Normal range of motion. Neck supple. No tracheal deviation present. No thyromegaly present.   Cardiovascular: Normal rate and regular rhythm.    Pulmonary/Chest: Effort normal. No respiratory distress. She has no wheezes.   Abdominal: Soft. She exhibits no distension. There is no hepatosplenomegaly. There is no tenderness. There is no CVA tenderness. No hernia.   Genitourinary:   Genitourinary Comments: Patient declined exam   Musculoskeletal: Normal range of motion. She exhibits no edema.   Neurological: She is alert and oriented to person, place, and time.   Skin: Skin is warm and dry. No rash noted. No erythema.     Psychiatric: She has a normal mood and affect. Her behavior is normal. Judgment normal.       Urine dipstick shows trace LE, 5-10 RBCs.      Assessment:       1. Interstitial cystitis    2. Gross hematuria    3. Incomplete bladder emptying    4. Pelvic pain    5. Acute cystitis without hematuria          Plan:       1. Interstitial cystitis  - Long history   - No prior notes available   -s/p hydrodistention on 1/4/19  - POCT " urinalysis, dipstick or tablet reag    2. Gross hematuria  - CT uro done in hospital. No proper delayed images done.  -UCx 12/26/18--E coli ESBL (treated with Bactrim)  -s/p cysto/sarah RPG on 1/4/19--normal  -Adequate hydration    3. Incomplete bladder emptying  -Chronic  -Elevated PVR today--patient declined murillo  -Cautioned concerning worsening ANN-MARIE/UR with Uribel. She will rtc/ER if symptoms develop  -Discussed double voiding  -Flomax BID  - POCT Bladder Scan  - tamsulosin (FLOMAX) 0.4 mg Cap; Take 1 capsule (0.4 mg total) by mouth 2 (two) times daily.  Dispense: 60 capsule; Refill: 11    4. Pelvic pain  -PFPT--referral previously placed by Dr. Acevedo  -Encouraged smoking cessation  - POCT urinalysis, dipstick or tablet reag  - Urine culture  - methen-m.blue-s.phos-phsal-hyo (URIBEL) 118-10-40.8-36 mg Cap; Take 1 capsule by mouth 3 (three) times daily as needed (bladder pain).  Dispense: 20 capsule; Refill: 11  -?bladder instillation in future    5. Acute cystitis without hematuria  -UCx 12/26/18--E coli ESBL  -Previously treated with Bactrim                  Follow-up in about 6 weeks (around 2/25/2019) for Follow up.

## 2019-01-15 LAB
BILIRUB SERPL-MCNC: NORMAL MG/DL
BLOOD URINE, POC: NORMAL
COLOR, POC UA: YELLOW
GLUCOSE UR QL STRIP: NORMAL
KETONES UR QL STRIP: NORMAL
LEUKOCYTE ESTERASE URINE, POC: NORMAL
NITRITE, POC UA: NORMAL
PH, POC UA: 5
PROTEIN, POC: NORMAL
SPECIFIC GRAVITY, POC UA: 1010
UROBILINOGEN, POC UA: NORMAL

## 2019-01-16 LAB — BACTERIA UR CULT: NORMAL

## 2019-01-29 ENCOUNTER — TELEPHONE (OUTPATIENT)
Dept: OBSTETRICS AND GYNECOLOGY | Facility: CLINIC | Age: 29
End: 2019-01-29

## 2019-01-29 ENCOUNTER — OFFICE VISIT (OUTPATIENT)
Dept: OBSTETRICS AND GYNECOLOGY | Facility: CLINIC | Age: 29
End: 2019-01-29
Payer: MEDICAID

## 2019-01-29 VITALS
SYSTOLIC BLOOD PRESSURE: 120 MMHG | DIASTOLIC BLOOD PRESSURE: 64 MMHG | WEIGHT: 168.44 LBS | TEMPERATURE: 99 F | HEIGHT: 65 IN | BODY MASS INDEX: 28.06 KG/M2

## 2019-01-29 DIAGNOSIS — N89.8 VAGINAL DISCHARGE: Primary | ICD-10-CM

## 2019-01-29 DIAGNOSIS — N76.2 ACUTE VULVITIS: ICD-10-CM

## 2019-01-29 PROCEDURE — 99213 PR OFFICE/OUTPT VISIT, EST, LEVL III, 20-29 MIN: ICD-10-PCS | Mod: S$PBB,,, | Performed by: OBSTETRICS & GYNECOLOGY

## 2019-01-29 PROCEDURE — 87491 CHLMYD TRACH DNA AMP PROBE: CPT

## 2019-01-29 PROCEDURE — 99213 OFFICE O/P EST LOW 20 MIN: CPT | Mod: S$PBB,,, | Performed by: OBSTETRICS & GYNECOLOGY

## 2019-01-29 PROCEDURE — 87510 GARDNER VAG DNA DIR PROBE: CPT

## 2019-01-29 PROCEDURE — 99999 PR PBB SHADOW E&M-EST. PATIENT-LVL III: ICD-10-PCS | Mod: PBBFAC,,, | Performed by: OBSTETRICS & GYNECOLOGY

## 2019-01-29 PROCEDURE — 87480 CANDIDA DNA DIR PROBE: CPT

## 2019-01-29 PROCEDURE — 99999 PR PBB SHADOW E&M-EST. PATIENT-LVL III: CPT | Mod: PBBFAC,,, | Performed by: OBSTETRICS & GYNECOLOGY

## 2019-01-29 PROCEDURE — 99213 OFFICE O/P EST LOW 20 MIN: CPT | Mod: PBBFAC | Performed by: OBSTETRICS & GYNECOLOGY

## 2019-01-29 RX ORDER — BETAMETHASONE DIPROPIONATE 0.5 MG/G
LOTION TOPICAL 2 TIMES DAILY
Qty: 60 ML | Refills: 0 | Status: SHIPPED | OUTPATIENT
Start: 2019-01-29 | End: 2019-12-28 | Stop reason: CLARIF

## 2019-01-29 NOTE — TELEPHONE ENCOUNTER
----- Message from Norah Boggs sent at 1/29/2019 12:48 PM CST -----  Contact: self  Pt is calling to speak with staff regarding her having a yeast infection, would like something called in. Please call pt at [phone]

## 2019-01-29 NOTE — PROGRESS NOTES
"  Subjective:       Patient ID: Crystal Tsai is a 28 y.o. female.    Chief Complaint:  Vaginal Discharge      History of Present Illness  HPI  Patient comes in today complaining of having a vaginal discharge for the past 2-3 days.    Still with dyspareunia.  New partner for the past couple of months.    Also with vulva irritation.  "Just on the outside!"    No other complaint.    History of IC    GYN & OB History  Patient's last menstrual period was 2019 (exact date).   Date of Last Pap: 2013    OB History    Para Term  AB Living   2 2 2     2   SAB TAB Ectopic Multiple Live Births           2      # Outcome Date GA Lbr Tyler/2nd Weight Sex Delivery Anes PTL Lv   2 Term 14 40w0d  3.317 kg (7 lb 5 oz) F   N RAJ   1 Term 07 40w0d  3.26 kg (7 lb 3 oz) F Vag-Spont EPI N RAJ        Past Medical History:   Diagnosis Date    Interstitial cystitis     Kidney stone     Migraine     UTI (urinary tract infection)        Past Surgical History:   Procedure Laterality Date    ABDOMINAL SURGERY      CYSTOSCOPY, WITH RETROGRADE PYELOGRAM; hydrodistention Bilateral 2019    Performed by Kirsty Acevedo MD at [facility] OR    Exam under anesthesia N/A 2018    Performed by Edilson Santana MD at [facility] OR    [place] EXPLORATORY OF ABDOMEN      PROCTOSIGMOIDOSCOPY N/A 2018    Performed by Edilson Santana MD at [facility] OR       Family History   Problem Relation Age of Onset    Cancer Mother     Diabetes Father        Social History     Socioeconomic History    Marital status: Single     Spouse name: None    Number of children: None    Years of education: None    Highest education level: None   Social Needs    Financial resource strain: None    Food insecurity - worry: None    Food insecurity - inability: None    Transportation needs - medical: None    Transportation needs - non-medical: None   Occupational History    None   Tobacco Use    Smoking status: Current " Every Day Smoker     Packs/day: 1.00    Smokeless tobacco: Never Used   Substance and Sexual Activity    Alcohol use: Yes     Alcohol/week: 0.0 oz     Comment: occassionally    Drug use: Yes     Types: Marijuana    Sexual activity: Yes     Partners: Male     Birth control/protection: None   Other Topics Concern    None   Social History Narrative    She has a high school diploma. Patient is currently not working. She has been together with the same person for over 3 years. He works as a .                Current Outpatient Medications   Medication Sig Dispense Refill    dicyclomine (BENTYL) 20 mg tablet Take 1 tablet (20 mg total) by mouth 3 (three) times daily as needed. 30 tablet 0    gabapentin (NEURONTIN) 300 MG capsule Take 2 capsules (600 mg total) by mouth 3 (three) times daily. 180 capsule 11    HYDROcodone-acetaminophen (NORCO) 5-325 mg per tablet Take 1 tablet by mouth every 6 (six) hours as needed for Pain. 10 tablet 0    ibuprofen (ADVIL,MOTRIN) 600 MG tablet Take 1 tablet (600 mg total) by mouth 3 (three) times daily. 60 tablet 2    methen-mHayleeblue-s.phos-phsal-hyo (URIBEL) 118-10-40.8-36 mg Cap Take 1 capsule by mouth 3 (three) times daily as needed (bladder pain). 20 capsule 11    phenazopyridine (PYRIDIUM) 100 MG tablet Take 2 tablets (200 mg total) by mouth 3 (three) times daily with meals. 18 tablet 0    tamsulosin (FLOMAX) 0.4 mg Cap Take 1 capsule (0.4 mg total) by mouth 2 (two) times daily. 60 capsule 11    VYFEMLA, 28, 0.4-35 mg-mcg per tablet TAKE 1 TABLET BY MOUTH EVERY DAY 28 tablet 6    betamethasone dipropionate (DIPROLENE) 0.05 % lotion Apply topically 2 (two) times daily. 60 mL 0     No current facility-administered medications for this visit.        Review of patient's allergies indicates:  No Known Allergies    Review of Systems  Review of Systems   Constitutional: Positive for fatigue. Negative for activity change, appetite change, chills, fever and unexpected weight  "change.   HENT: Negative for mouth sores.    Respiratory: Negative for cough, shortness of breath and wheezing.    Cardiovascular: Negative for chest pain and palpitations.   Gastrointestinal: Positive for abdominal pain. Negative for bloating, blood in stool, constipation, nausea and vomiting.        Left upper and lower quadrant pain.  More over symphysis in the midline.  Having a bulge on the left side of lower abdomen.  "Comes and goes"   Endocrine: Negative for diabetes and hot flashes.   Genitourinary: Positive for dyspareunia, frequency and pelvic pain. Negative for hematuria, menorrhagia, menstrual problem, urgency, vaginal bleeding, vaginal discharge, vaginal pain, dysmenorrhea, urinary incontinence, postcoital bleeding and vaginal odor.   Musculoskeletal: Negative for back pain and myalgias.   Neurological: Negative for seizures and headaches.   Psychiatric/Behavioral: Positive for sleep disturbance. Negative for depression. The patient is nervous/anxious.         Irritated   Breast: Negative for mass, mastodynia and nipple discharge          Objective:    Physical Exam:   Constitutional: She appears well-developed and well-nourished. No distress.   In pain.    HENT:   Head: Normocephalic and atraumatic.    Eyes: EOM are normal.    Neck: Normal range of motion.     Pulmonary/Chest: Effort normal. No respiratory distress.   Breasts: Non-tender, no engorgement, no masses, no retraction, no discharge. Negative for lymphadenopathy.         Abdominal: Soft. She exhibits no distension. There is tenderness. There is guarding. There is no rebound.     Genitourinary: Vagina normal and uterus normal. No vaginal discharge found.   Genitourinary Comments: Vulva without any obvious lesions.  Vaginal vault with good support.  Minimal white discharge noted.  No obvious lesion.  Cervix is without any cervical motion tenderness.  No obvious lesion.  Uterus is small, ?tender, normal contour.  Adnexa is without any masses or " tenderness.  ? Bladder tenderness           Musculoskeletal: Normal range of motion.       Neurological: She is alert.    Skin: Skin is warm and dry.    Psychiatric:   Anxious           Assessment:        1. Vaginal discharge    2. Acute vulvitis    3.  Family planning        Plan:      I have extensively discussed with the patient regarding her condition  GC, chlam and the Affirm test performed per request.  We will contact her with results and proper treatment.    She is encouraged to return to Urology for care.    Betamethasone cream for possible contact dermatitis on vulva.    As she was leaving, she would like to discuss contraception.    I have also discussed with the patient regarding her contraceptive options.  Risks and benefits of all discussed including oral contraceptives, Depo-Provera, OrthoEvra, NuvaRing, Mirena/ParaGard, Implanon, sterilization. After extensive dicussion, the patient wishes to have the Nexplanon.  Risks and benefits again discussed.  All of her questions were answered appropriately to her satisfaction.     We will order the device  She will be back for insertion.

## 2019-01-30 LAB
CANDIDA RRNA VAG QL PROBE: NEGATIVE
G VAGINALIS RRNA GENITAL QL PROBE: POSITIVE
T VAGINALIS RRNA GENITAL QL PROBE: NEGATIVE

## 2019-01-31 ENCOUNTER — TELEPHONE (OUTPATIENT)
Dept: OBSTETRICS AND GYNECOLOGY | Facility: CLINIC | Age: 29
End: 2019-01-31

## 2019-01-31 DIAGNOSIS — B96.89 BACTERIAL VAGINOSIS: Primary | ICD-10-CM

## 2019-01-31 DIAGNOSIS — N76.0 BACTERIAL VAGINOSIS: Primary | ICD-10-CM

## 2019-01-31 LAB
C TRACH DNA SPEC QL NAA+PROBE: NOT DETECTED
N GONORRHOEA DNA SPEC QL NAA+PROBE: NOT DETECTED

## 2019-01-31 RX ORDER — METRONIDAZOLE 500 MG/1
500 TABLET ORAL EVERY 12 HOURS
Qty: 14 TABLET | Refills: 0 | Status: SHIPPED | OUTPATIENT
Start: 2019-01-31 | End: 2019-02-07

## 2019-02-06 ENCOUNTER — TELEPHONE (OUTPATIENT)
Dept: OBSTETRICS AND GYNECOLOGY | Facility: CLINIC | Age: 29
End: 2019-02-06

## 2019-02-06 NOTE — TELEPHONE ENCOUNTER
Pt aware of result and has been taking medication for treatment. Pt was also informed to expect a phone call from a specialty pharmacy regarding her Nexplanon. Pt voiced understanding. natalio    ----- Message from Domonique Reeves sent at 2/6/2019 11:18 AM CST -----  Contact: self  [phone]  Pt is returning your office call. Pls call pt [phone]. Thanks...............Yancy

## 2019-02-07 ENCOUNTER — TELEPHONE (OUTPATIENT)
Dept: UROLOGY | Facility: CLINIC | Age: 29
End: 2019-02-07

## 2019-02-07 ENCOUNTER — OFFICE VISIT (OUTPATIENT)
Dept: UROLOGY | Facility: CLINIC | Age: 29
End: 2019-02-07
Payer: MEDICAID

## 2019-02-07 VITALS
BODY MASS INDEX: 28.57 KG/M2 | WEIGHT: 171.5 LBS | SYSTOLIC BLOOD PRESSURE: 110 MMHG | DIASTOLIC BLOOD PRESSURE: 70 MMHG | HEIGHT: 65 IN

## 2019-02-07 DIAGNOSIS — R39.89 SUSPECTED UTI: Primary | ICD-10-CM

## 2019-02-07 DIAGNOSIS — R33.9 INCOMPLETE BLADDER EMPTYING: ICD-10-CM

## 2019-02-07 DIAGNOSIS — R30.0 DYSURIA: ICD-10-CM

## 2019-02-07 DIAGNOSIS — N30.10 INTERSTITIAL CYSTITIS: ICD-10-CM

## 2019-02-07 LAB
BILIRUB SERPL-MCNC: NORMAL MG/DL
BLOOD URINE, POC: NORMAL
COLOR, POC UA: NORMAL
GLUCOSE UR QL STRIP: NORMAL
KETONES UR QL STRIP: NORMAL
LEUKOCYTE ESTERASE URINE, POC: NORMAL
NITRITE, POC UA: NORMAL
PH, POC UA: 7
PROTEIN, POC: NORMAL
SPECIFIC GRAVITY, POC UA: 1005
UROBILINOGEN, POC UA: NORMAL

## 2019-02-07 PROCEDURE — 99999 PR PBB SHADOW E&M-EST. PATIENT-LVL III: CPT | Mod: PBBFAC,,, | Performed by: NURSE PRACTITIONER

## 2019-02-07 PROCEDURE — 99214 PR OFFICE/OUTPT VISIT, EST, LEVL IV, 30-39 MIN: ICD-10-PCS | Mod: S$PBB,,, | Performed by: NURSE PRACTITIONER

## 2019-02-07 PROCEDURE — 99999 PR PBB SHADOW E&M-EST. PATIENT-LVL III: ICD-10-PCS | Mod: PBBFAC,,, | Performed by: NURSE PRACTITIONER

## 2019-02-07 PROCEDURE — 81001 URINALYSIS AUTO W/SCOPE: CPT | Mod: PBBFAC | Performed by: NURSE PRACTITIONER

## 2019-02-07 PROCEDURE — 99214 OFFICE O/P EST MOD 30 MIN: CPT | Mod: S$PBB,,, | Performed by: NURSE PRACTITIONER

## 2019-02-07 PROCEDURE — 87086 URINE CULTURE/COLONY COUNT: CPT

## 2019-02-07 PROCEDURE — 99213 OFFICE O/P EST LOW 20 MIN: CPT | Mod: PBBFAC | Performed by: NURSE PRACTITIONER

## 2019-02-07 RX ORDER — CIPROFLOXACIN 500 MG/1
500 TABLET ORAL 2 TIMES DAILY
Qty: 14 TABLET | Refills: 0 | Status: SHIPPED | OUTPATIENT
Start: 2019-02-07 | End: 2019-02-14

## 2019-02-07 NOTE — TELEPHONE ENCOUNTER
Patient states she woke up burning really bad while urinating .. Would like to drop off a urine specimen to have urine checked.. B.N.

## 2019-02-07 NOTE — TELEPHONE ENCOUNTER
----- Message from Adeel Banks sent at 2/7/2019  9:26 AM CST -----  Contact: Self/[phone]  The patient would like to speak to the staff regarding a UTI.          Thank you

## 2019-02-07 NOTE — PROGRESS NOTES
Subjective:       Patient ID: Crystal Tsai is a 28 y.o. female who was last seen in this office 1/14/2019    Chief Complaint:   Chief Complaint   Patient presents with    Urinary Tract Infection     Patient states this morning while she was urinating it felt like she was on fire.. and still feels burning after she finishes .. says she's been drinking alot of water     Per Dr. Acevedo:  She was seen as inpatient consultation 12/16/18 by Dr Jc. She has diagnosis of IC (since age 17) and was seen by other urologists years ago. Records not available. She was reportedly scheduled to see another urologist in 1/19.     She was admitted for pain, possible perirectal abscess. Also noted to have ANN-MARIE issues requiring CIC occasionally. She reported that was a chronic issue. Flomax given in house. Possible gross hematuria while admitted.     Previously on Elmiron and other meds (unsure what) that were cost-prohibitive. On Neurontin. She continues to smoke.     She continues to mostly c/o pain. Some relief from heating pads. She is avoiding most bladder irritants. Pain worst after intercourse. She is limited fluids now due to concern for voiding issues.      No UCx done during recent hospitalization. UA concerning for UTI.     CT uro 12/18 - no delayed imaging done - distal ureters unable to be assessed. Radiologist concerned for possible ureteral stricture in R (unable to assess this with available imaging). Bladder/kidneys otherwise normal.     PVR (bladder scan) initial visit- 27cc    She was treated with Bactrim for UTI in 12/2018 by Dr. Acevedo. She is now s/p cystoscopy with bilateral RPG on 1/4/19 as part of hematuria workup. Cysto/RPG--normal. She also underwent hydrodistention at that time.     She presents today as an urgent visit with c/o dysuria that began this morning. She reports moderate relief with Uribel prn. She is concerned that she may have a UTI. Denies gross hematuria, flank pain or fever.  She reports compliance with IC diet    PVR (bladder scan) last visit - 307 ml patient voided again, repeat PVR--158 ml    ACTIVE MEDICAL ISSUES:  Patient Active Problem List   Diagnosis    Interstitial cystitis    Cellulitis of left thumb    Left ovarian cyst    Pelvic pain    Acute cystitis    Acute renal failure    Hypokalemia    Tobacco abuse    Incomplete bladder emptying    Gross hematuria       ALLERGIES AND MEDICATIONS: updated and reviewed.  Review of patient's allergies indicates:  No Known Allergies  Current Outpatient Medications   Medication Sig    betamethasone dipropionate (DIPROLENE) 0.05 % lotion Apply topically 2 (two) times daily.    dicyclomine (BENTYL) 20 mg tablet Take 1 tablet (20 mg total) by mouth 3 (three) times daily as needed.    gabapentin (NEURONTIN) 300 MG capsule Take 2 capsules (600 mg total) by mouth 3 (three) times daily.    HYDROcodone-acetaminophen (NORCO) 5-325 mg per tablet Take 1 tablet by mouth every 6 (six) hours as needed for Pain.    ibuprofen (ADVIL,MOTRIN) 600 MG tablet Take 1 tablet (600 mg total) by mouth 3 (three) times daily.    methen-m.blue-s.phos-phsal-hyo (URIBEL) 118-10-40.8-36 mg Cap Take 1 capsule by mouth 3 (three) times daily as needed (bladder pain).    metroNIDAZOLE (FLAGYL) 500 MG tablet Take 1 tablet (500 mg total) by mouth every 12 (twelve) hours. for 7 days    phenazopyridine (PYRIDIUM) 100 MG tablet Take 2 tablets (200 mg total) by mouth 3 (three) times daily with meals.    tamsulosin (FLOMAX) 0.4 mg Cap Take 1 capsule (0.4 mg total) by mouth 2 (two) times daily.    VYFEMLA, 28, 0.4-35 mg-mcg per tablet TAKE 1 TABLET BY MOUTH EVERY DAY    ciprofloxacin HCl (CIPRO) 500 MG tablet Take 1 tablet (500 mg total) by mouth 2 (two) times daily. for 7 days     No current facility-administered medications for this visit.        Review of Systems   Constitutional: Negative for activity change, chills, fatigue, fever and unexpected weight  "change.   Eyes: Negative for discharge, redness and visual disturbance.   Respiratory: Negative for cough, shortness of breath and wheezing.    Cardiovascular: Negative for chest pain and leg swelling.   Gastrointestinal: Negative for abdominal distention, abdominal pain, constipation, diarrhea, nausea and vomiting.   Genitourinary: Positive for dysuria and pelvic pain. Negative for decreased urine volume, difficulty urinating, flank pain, frequency, hematuria, urgency and vaginal bleeding.   Musculoskeletal: Negative for arthralgias, joint swelling and myalgias.   Skin: Negative for color change and rash.   Neurological: Negative for dizziness and light-headedness.   Psychiatric/Behavioral: Negative for behavioral problems and confusion. The patient is not nervous/anxious.        Objective:      Vitals:    02/07/19 1348   BP: 110/70   Weight: 77.8 kg (171 lb 8.3 oz)   Height: 5' 5" (1.651 m)     Physical Exam   Constitutional: She is oriented to person, place, and time. She appears well-developed.   HENT:   Head: Normocephalic and atraumatic.   Nose: Nose normal.   Eyes: Conjunctivae are normal. Right eye exhibits no discharge. Left eye exhibits no discharge.   Neck: Normal range of motion. Neck supple. No tracheal deviation present. No thyromegaly present.   Cardiovascular: Normal rate and regular rhythm.    Pulmonary/Chest: Effort normal. No respiratory distress. She has no wheezes.   Abdominal: Soft. She exhibits no distension. There is no hepatosplenomegaly. There is no tenderness. There is no CVA tenderness. No hernia.   Genitourinary:   Genitourinary Comments: Patient declined exam   Musculoskeletal: Normal range of motion. She exhibits no edema.   Neurological: She is alert and oriented to person, place, and time.   Skin: Skin is warm and dry. No rash noted. No erythema.     Psychiatric: She has a normal mood and affect. Her behavior is normal. Judgment normal.       Urine dipstick shows trace LE. "     Assessment:       1. Suspected UTI    2. Dysuria    3. Interstitial cystitis    4. Incomplete bladder emptying          Plan:       1. Suspected UTI  -Empiric Cipro today  - POCT urinalysis, dipstick or tablet reag  - Urine culture  - ciprofloxacin HCl (CIPRO) 500 MG tablet; Take 1 tablet (500 mg total) by mouth 2 (two) times daily. for 7 days  Dispense: 14 tablet; Refill: 0    2. Dysuria  - Urine culture  - methen-m.blue-s.phos-phsal-hyo (URIBEL) 118-10-40.8-36 mg Cap; Take 1 capsule by mouth 3 (three) times daily as needed (bladder pain).  Dispense: 20 capsule; Refill: 11    3. Interstitial cystitis  - Long history   -s/p hydrodistention on 1/4/19  -Possible IC flare vs UTI  -Ok to continue Uribel prn    4. Incomplete bladder emptying  -Chronic  -Double voiding  -Flomax BID        Follow-up in about 3 weeks (around 2/25/2019) for Follow up.

## 2019-02-09 LAB — BACTERIA UR CULT: NORMAL

## 2019-02-12 ENCOUNTER — TELEPHONE (OUTPATIENT)
Dept: UROLOGY | Facility: CLINIC | Age: 29
End: 2019-02-12

## 2019-02-12 NOTE — TELEPHONE ENCOUNTER
Please inform patient that recent urine culture was negative for infection. No need for antibiotics at this time. Ok to stop Cipro that was previously prescribed

## 2019-02-27 ENCOUNTER — PROCEDURE VISIT (OUTPATIENT)
Dept: OBSTETRICS AND GYNECOLOGY | Facility: CLINIC | Age: 29
End: 2019-02-27
Payer: MEDICAID

## 2019-02-27 VITALS — TEMPERATURE: 99 F | SYSTOLIC BLOOD PRESSURE: 120 MMHG | DIASTOLIC BLOOD PRESSURE: 74 MMHG

## 2019-02-27 DIAGNOSIS — Z30.017 NEXPLANON INSERTION: Primary | ICD-10-CM

## 2019-02-27 LAB
B-HCG UR QL: NEGATIVE
CTP QC/QA: YES

## 2019-02-27 PROCEDURE — 81025 URINE PREGNANCY TEST: CPT | Mod: PBBFAC | Performed by: OBSTETRICS & GYNECOLOGY

## 2019-02-27 PROCEDURE — 11981 INSERTION DRUG DLVR IMPLANT: CPT | Mod: PBBFAC | Performed by: OBSTETRICS & GYNECOLOGY

## 2019-02-27 PROCEDURE — 11981 INSERTION OF NEXPLANON DEVICE: ICD-10-PCS | Mod: S$PBB,,, | Performed by: OBSTETRICS & GYNECOLOGY

## 2019-02-27 NOTE — PROCEDURES
Insertion of Nexplanon Device  Date/Time: 2/27/2019 5:20 PM  Performed by: Aman Johnson MD  Authorized by: Aman Johnson MD     Consent obtained:  Written  Consent given by:  Patient  Patient questions answered: yes    Patient agrees, verbalizes understanding, and wants to proceed: yes    Educational handouts given: yes    Instructions and paperwork completed: yes    Pre-procedure timeout performed: yes    Prepped with: alcohol 70%    Local anesthetic:  Xylocaine with epinephrine  The site was cleaned and prepped in a sterile fashion: yes    Small stab incision was made in arm: no    Left/right:  Left  Preloaded Implanon trocar was placed subdermally: yes    Visualization of implant was obtained: yes    Nexplanon was inserted and trocar removed: yes    Visualization of notch in stilette and palpitation of device: yes    Palpitation confirms placement by provider and patient: yes    Site was closed with steri-strips and pressure bandage applied: yes        PROCEDURE NOTES:    Patient was appropriately counseled regarding risks and benefits of Nexplanon.  All other contraceptive options discussed previously.  Patient still wished to have Nexplanon placed.  All questions were answered.  Consents for Nexplanon signed.    Patient was put in the supine position with her nondominant arm flexed and her hand under her head.  The arm was examined and appropriate markings made.  The implant site was cleaned with ChloraPrep.  About 2.5 cc of 1% Xylocaine with epinephrine was used for local anesthesia.  The Nexplanon was checked and presented to the patient previously.  The lot number and expiration date were recorded.  The Nexplanon was then placed subcutaneously without any difficulty in a standard fashion.  The trocar was then removed.  The trocar sites was noted to be in good hemostasis.  2 x 2 was placed over the site and dressing applied.  Palpation of the implant was performed; the presence of Implanon in the arm  confirmed.  Postprocedure pain was rated at 1/10.  Educational material was given.  Patient was instructed on palpation of the implant, and the care of the trocar site.  She is to keep it dry for the 24 hours.    Lot# [id number]   Exp- 6/2021    FOLLOWUP: In 2-4 weeks.

## 2019-03-04 DIAGNOSIS — N83.202 CYST OF LEFT OVARY: ICD-10-CM

## 2019-03-04 DIAGNOSIS — R10.2 FEMALE PELVIC PAIN: ICD-10-CM

## 2019-03-04 DIAGNOSIS — R10.32 LEFT LOWER QUADRANT PAIN: ICD-10-CM

## 2019-03-05 RX ORDER — NORETHINDRONE AND ETHINYL ESTRADIOL 0.4-0.035
KIT ORAL
Qty: 28 TABLET | Refills: 0 | Status: SHIPPED | OUTPATIENT
Start: 2019-03-05 | End: 2019-05-20

## 2019-03-07 ENCOUNTER — TELEPHONE (OUTPATIENT)
Dept: OBSTETRICS AND GYNECOLOGY | Facility: CLINIC | Age: 29
End: 2019-03-07

## 2019-03-20 ENCOUNTER — HOSPITAL ENCOUNTER (EMERGENCY)
Facility: HOSPITAL | Age: 29
Discharge: HOME | End: 2019-03-21
Attending: INTERNAL MEDICINE
Payer: MEDICAID

## 2019-03-20 VITALS
TEMPERATURE: 98 F | SYSTOLIC BLOOD PRESSURE: 112 MMHG | WEIGHT: 160 LBS | HEIGHT: 65 IN | HEART RATE: 70 BPM | OXYGEN SATURATION: 100 % | RESPIRATION RATE: 20 BRPM | DIASTOLIC BLOOD PRESSURE: 57 MMHG | BODY MASS INDEX: 26.66 KG/M2

## 2019-03-20 DIAGNOSIS — M54.32 SCIATICA OF LEFT SIDE: Primary | ICD-10-CM

## 2019-03-20 LAB
ALBUMIN SERPL-MCNC: 3.6 G/DL
ALP SERPL-CCNC: 35 U/L
B-HCG UR QL: NEGATIVE
BILIRUB SERPL-MCNC: 0.3 MG/DL
BILIRUBIN, POC UA: NEGATIVE
BLOOD, POC UA: ABNORMAL
BUN SERPL-MCNC: 11 MG/DL
CALCIUM SERPL-MCNC: 8.9 MG/DL
CHLORIDE SERPL-SCNC: 103 MMOL/L
CLARITY, POC UA: CLEAR
COLOR, POC UA: YELLOW
CREAT SERPL-MCNC: 1.1 MG/DL
CTP QC/QA: YES
GLUCOSE SERPL-MCNC: 87 MG/DL (ref 70–110)
GLUCOSE, POC UA: NEGATIVE
KETONES, POC UA: NEGATIVE
LEUKOCYTE EST, POC UA: NEGATIVE
NITRITE, POC UA: NEGATIVE
PH UR STRIP: 7 [PH]
POC ALT (SGPT): 17 U/L
POC AST (SGOT): 21 U/L
POC TCO2: 30 MMOL/L
POTASSIUM BLD-SCNC: 4.5 MMOL/L
PROTEIN, POC UA: ABNORMAL
PROTEIN, POC: 6.8 G/DL
SODIUM BLD-SCNC: 143 MMOL/L
SPECIFIC GRAVITY, POC UA: 1.02
UROBILINOGEN, POC UA: 0.2 E.U./DL

## 2019-03-20 PROCEDURE — 85025 COMPLETE CBC W/AUTO DIFF WBC: CPT | Mod: ER

## 2019-03-20 PROCEDURE — 81003 URINALYSIS AUTO W/O SCOPE: CPT | Mod: ER

## 2019-03-20 PROCEDURE — 63600175 PHARM REV CODE 636 W HCPCS: Mod: ER | Performed by: INTERNAL MEDICINE

## 2019-03-20 PROCEDURE — 99284 EMERGENCY DEPT VISIT MOD MDM: CPT | Mod: 25,ER

## 2019-03-20 PROCEDURE — 25000003 PHARM REV CODE 250: Mod: ER | Performed by: INTERNAL MEDICINE

## 2019-03-20 PROCEDURE — 80053 COMPREHEN METABOLIC PANEL: CPT | Mod: ER

## 2019-03-20 PROCEDURE — 96374 THER/PROPH/DIAG INJ IV PUSH: CPT | Mod: ER

## 2019-03-20 PROCEDURE — 81025 URINE PREGNANCY TEST: CPT | Mod: ER | Performed by: INTERNAL MEDICINE

## 2019-03-20 RX ORDER — KETOROLAC TROMETHAMINE 30 MG/ML
30 INJECTION, SOLUTION INTRAMUSCULAR; INTRAVENOUS
Status: DISCONTINUED | OUTPATIENT
Start: 2019-03-20 | End: 2019-03-20

## 2019-03-20 RX ORDER — PROMETHAZINE HYDROCHLORIDE 25 MG/1
25 TABLET ORAL
Status: COMPLETED | OUTPATIENT
Start: 2019-03-20 | End: 2019-03-20

## 2019-03-20 RX ORDER — KETOROLAC TROMETHAMINE 10 MG/1
10 TABLET, FILM COATED ORAL 3 TIMES DAILY PRN
Qty: 10 TABLET | Refills: 0 | Status: SHIPPED | OUTPATIENT
Start: 2019-03-20 | End: 2019-05-20

## 2019-03-20 RX ORDER — TRAMADOL HYDROCHLORIDE 50 MG/1
50 TABLET ORAL
Status: COMPLETED | OUTPATIENT
Start: 2019-03-20 | End: 2019-03-20

## 2019-03-20 RX ORDER — KETOROLAC TROMETHAMINE 30 MG/ML
30 INJECTION, SOLUTION INTRAMUSCULAR; INTRAVENOUS
Status: COMPLETED | OUTPATIENT
Start: 2019-03-20 | End: 2019-03-20

## 2019-03-20 RX ORDER — PREDNISONE 20 MG/1
40 TABLET ORAL DAILY
Qty: 10 TABLET | Refills: 0 | Status: SHIPPED | OUTPATIENT
Start: 2019-03-20 | End: 2019-03-25

## 2019-03-20 RX ADMIN — KETOROLAC TROMETHAMINE 30 MG: 30 INJECTION, SOLUTION INTRAMUSCULAR; INTRAVENOUS at 09:03

## 2019-03-20 RX ADMIN — PROMETHAZINE HYDROCHLORIDE 25 MG: 25 TABLET ORAL at 11:03

## 2019-03-20 RX ADMIN — TRAMADOL HYDROCHLORIDE 50 MG: 50 TABLET, FILM COATED ORAL at 11:03

## 2019-03-21 NOTE — ED PROVIDER NOTES
Encounter Date: 3/20/2019    SCRIBE #1 NOTE: I, Viola Salas, am scribing for, and in the presence of,  Dr. Nobles. I have scribed the following portions of the note - Other sections scribed: HPI, ROS, PE.       History     Chief Complaint   Patient presents with    rib pain     PT C/O LEFT SIDED RIB PAIN RADIATING DOWN LEFT LEG FOR 1 WEEK     Crystal Tsai is a 28 y.o. female who presents to the ED complaining of left sided rib pain for one week. Pain radiating down entire left side including abdomen and back. Describes a cramping pain. Tried heating pads without relief. Denies fever, nausea, vomiting, diarrhea. She denies being on menstrual cycle now. No trauma or injury.      The history is provided by the patient. No  was used.     Review of patient's allergies indicates:  No Known Allergies  Past Medical History:   Diagnosis Date    Interstitial cystitis     Kidney stone     Migraine     UTI (urinary tract infection)      Past Surgical History:   Procedure Laterality Date    ABDOMINAL SURGERY      CYSTOSCOPY, WITH RETROGRADE PYELOGRAM; hydrodistention Bilateral 1/4/2019    [facility] at [facility] OR    Exam under anesthesia N/A 12/17/2018    [facility] by [facility] at [facility] OR    IN EXPLORATORY OF ABDOMEN      PROCTOSIGMOIDOSCOPY N/A 12/17/2018    Per[facility] by [facility] at [facility] OR     Family History   Problem Relation Age of Onset    Cancer Mother     Diabetes Father      Social History     Tobacco Use    Smoking status: Current Every Day Smoker     Packs/day: 1.00    Smokeless tobacco: Never Used   Substance Use Topics    Alcohol use: Yes     Alcohol/week: 0.0 oz     Comment: occassionally    Drug use: Yes     Types: Marijuana     Review of Systems   Constitutional: Negative for fever.   HENT: Negative for sore throat.    Respiratory: Negative for shortness of breath.    Cardiovascular: Negative for chest pain.    Gastrointestinal: Positive for abdominal pain. Negative for diarrhea, nausea and vomiting.   Genitourinary: Negative for dysuria and vaginal bleeding.   Musculoskeletal: Positive for back pain and myalgias (L side). Negative for gait problem.        +Rib pain   Skin: Negative for wound.   Neurological: Negative for headaches.   All other systems reviewed and are negative.      Physical Exam     Initial Vitals [03/20/19 2048]   BP Pulse Resp Temp SpO2   118/79 97 20 97.8 °F (36.6 °C) 100 %      MAP       --         Physical Exam    Nursing note and vitals reviewed.  Constitutional: She appears well-developed and well-nourished.   HENT:   Head: Normocephalic and atraumatic.   Right Ear: External ear normal.   Left Ear: External ear normal.   Nose: Nose normal.   Mouth/Throat: Oropharynx is clear and moist.   Eyes: Conjunctivae are normal.   Neck: Normal range of motion. Neck supple.   Cardiovascular: Normal rate, regular rhythm, normal heart sounds and intact distal pulses. Exam reveals no gallop and no friction rub.    No murmur heard.  Pulmonary/Chest: Effort normal and breath sounds normal. No respiratory distress. She has no wheezes. She has no rhonchi. She has no rales.   Abdominal: Soft. Bowel sounds are normal. She exhibits no distension. There is tenderness in the left upper quadrant and left lower quadrant. There is no rigidity, no rebound and no guarding.   Musculoskeletal: Normal range of motion. She exhibits tenderness.   Left lower back and buttocks pain radiating to lower extremity.   Neurological: She is alert and oriented to person, place, and time. She has normal strength. No cranial nerve deficit or sensory deficit. GCS score is 15. GCS eye subscore is 4. GCS verbal subscore is 5. GCS motor subscore is 6.   Skin: Skin is warm and dry. Capillary refill takes less than 2 seconds.   Psychiatric: She has a normal mood and affect.         ED Course   Procedures  Labs Reviewed   POCT URINALYSIS W/O SCOPE  - Abnormal; Notable for the following components:       Result Value    Glucose, UA Negative (*)     Bilirubin, UA Negative (*)     Ketones, UA Negative (*)     Blood, UA Trace-intact (*)     Protein, UA Trace (*)     Nitrite, UA Negative (*)     Leukocytes, UA Negative (*)     All other components within normal limits   POCT URINE PREGNANCY   POCT URINALYSIS W/O SCOPE   POCT CBC   POCT CMP   POCT CMP          Imaging Results          CT Renal Stone Study ABD Pelvis WO (Final result)  Result time 03/20/19 23:24:49    Final result by Rohini Benton MD (03/20/19 23:24:49)                 Impression:      No evidence of hydronephrosis or renal stones. No acute abdominal pathology is identified.      Electronically signed by: Rohini Benton  Date:    03/20/2019  Time:    23:24             Narrative:    EXAMINATION:  CT ABDOMEN AND PELVIS WITHOUT CONTRAST:    CLINICAL HISTORY:  Hematuria    TECHNIQUE:  CT of the abdomen and pelvis without intravenous or oral contrast dated. Contiguous axial 5 mm images were obtained from the lung bases through the pubic symphysis.    COMPARISON:  12/17/2018    FINDINGS:  There are no calcified renal stones. There is no hydronephrosis. The ureters are normal in course and caliber bilaterally. The urinary bladder shows no evidence of filling defect or wall abnormalities. The pelvic genitourinary organs are within normal limits.    There is limited evaluation of the liver, spleen, pancreas, and adrenal glands, which have an unremarkable non-contrast enhanced appearance.  The gallbladder is contracted.  There is a tiny fat containing umbilical hernia.    Limited evaluation of the gastrointestinal tract in the absence of oral contrast shows no evidence of obstruction or free air. No acute inflammatory changes are identified. Residual hyperdensity is again seen within the noninflamed appendix.    Bilateral pars defects are seen at L5.                                 Medical Decision  Making:   History:   Old Medical Records: I decided to obtain old medical records.  Initial Assessment:   This is an emergent evaluation of an 28 y.o. female presenting with left sided pain for one week. No trauma or injury.  Clinical Tests:   Lab Tests: Ordered and Reviewed  ED Management:  CBC and CMP were within normal limits. Urinalysis reveals trace red blood cells.  CT of the abdomen and pelvis with renal stone protocol was ordered.  Results reveal no evidence of renal stone.  Patient was given instructions for sciatica and received Toradol in the emergency department.  Prescription for Toradol and steroid burst were given.  Patient was advised to follow up with her primary care physician within the next 2 days for re-evaluation and to return to the emergency department if condition worsens.            Scribe Attestation:   Scribe #1: I performed the above scribed service and the documentation accurately describes the services I performed. I attest to the accuracy of the note.    This document was produced by a scribe under my direction and in my presence. I agree with the content of the note and have made any necessary edits.     Dr. Nobles    03/20/2019 10:02 PM             Clinical Impression:     1. Sciatica of left side            Disposition:   Disposition: Discharged  Condition: Stable                        Franko Nobles MD  03/22/19 0345

## 2019-05-20 ENCOUNTER — OFFICE VISIT (OUTPATIENT)
Dept: OBSTETRICS AND GYNECOLOGY | Facility: CLINIC | Age: 29
End: 2019-05-20
Payer: MEDICAID

## 2019-05-20 VITALS
BODY MASS INDEX: 28.1 KG/M2 | SYSTOLIC BLOOD PRESSURE: 130 MMHG | WEIGHT: 168.63 LBS | HEIGHT: 65 IN | DIASTOLIC BLOOD PRESSURE: 82 MMHG

## 2019-05-20 DIAGNOSIS — R30.0 DYSURIA: ICD-10-CM

## 2019-05-20 DIAGNOSIS — Z11.3 SCREENING EXAMINATION FOR STD (SEXUALLY TRANSMITTED DISEASE): ICD-10-CM

## 2019-05-20 DIAGNOSIS — N94.89 VULVAR BURNING: Primary | ICD-10-CM

## 2019-05-20 PROCEDURE — 87480 CANDIDA DNA DIR PROBE: CPT

## 2019-05-20 PROCEDURE — 99213 OFFICE O/P EST LOW 20 MIN: CPT | Mod: S$PBB,,, | Performed by: OBSTETRICS & GYNECOLOGY

## 2019-05-20 PROCEDURE — 99213 OFFICE O/P EST LOW 20 MIN: CPT | Mod: PBBFAC | Performed by: OBSTETRICS & GYNECOLOGY

## 2019-05-20 PROCEDURE — 99999 PR PBB SHADOW E&M-EST. PATIENT-LVL III: CPT | Mod: PBBFAC,,, | Performed by: OBSTETRICS & GYNECOLOGY

## 2019-05-20 PROCEDURE — 99999 PR PBB SHADOW E&M-EST. PATIENT-LVL III: ICD-10-PCS | Mod: PBBFAC,,, | Performed by: OBSTETRICS & GYNECOLOGY

## 2019-05-20 PROCEDURE — 87186 SC STD MICRODIL/AGAR DIL: CPT

## 2019-05-20 PROCEDURE — 87088 URINE BACTERIA CULTURE: CPT

## 2019-05-20 PROCEDURE — 87491 CHLMYD TRACH DNA AMP PROBE: CPT

## 2019-05-20 PROCEDURE — 87510 GARDNER VAG DNA DIR PROBE: CPT

## 2019-05-20 PROCEDURE — 99213 PR OFFICE/OUTPT VISIT, EST, LEVL III, 20-29 MIN: ICD-10-PCS | Mod: S$PBB,,, | Performed by: OBSTETRICS & GYNECOLOGY

## 2019-05-20 PROCEDURE — 87077 CULTURE AEROBIC IDENTIFY: CPT

## 2019-05-20 PROCEDURE — 87086 URINE CULTURE/COLONY COUNT: CPT

## 2019-05-20 RX ORDER — ETONOGESTREL 68 MG/1
IMPLANT SUBCUTANEOUS
Status: ON HOLD | COMMUNITY
Start: 2019-02-25 | End: 2021-08-06 | Stop reason: HOSPADM

## 2019-05-20 NOTE — PROGRESS NOTES
"SUBJECTIVE:   28 y.o. female   for vaginal burning    Patient's last menstrual period was 2019 (approximate)..  She complains of vaginal burning  Onset this mornign, when she woke up " burning inside the vagina and vulvar"  Denies discharge  Malodor: no  Pt went to the beach yesterday - noticed swim suit was big and had lot of sand in the swim suit  She also noticed blood in the toilet after urination  She had nexplanon placed a few months ago and had intermittent spotting  Last coitus was 3 days ago, last time shaving was 1 week ago - she also used a new razor  Denies new soap or detergent  Pt reported history "vaginal spasm" usually when she has a bladder infection  Also c/o UTI symptoms - burning with urination, can empty the bladder, no changes in urinary frequency   Pt wants to refill on pyridium            OB History    Para Term  AB Living   2 2 2     2   SAB TAB Ectopic Multiple Live Births           2      # Outcome Date GA Lbr Tyler/2nd Weight Sex Delivery Anes PTL Lv   2 Term 14 40w0d  3.317 kg (7 lb 5 oz) F   N RAJ   1 Term 07 40w0d  3.26 kg (7 lb 3 oz) F Vag-Spont EPI N RAJ     Past Medical History:   Diagnosis Date    Interstitial cystitis     Kidney stone     Migraine     UTI (urinary tract infection)      Past Surgical History:   Procedure Laterality Date    ABDOMINAL SURGERY      CYSTOSCOPY, WITH RETROGRADE PYELOGRAM; hydrodistention Bilateral 2019    [facility]    Exam under anesthesia N/A 2018    [facility] at [facility]    [place] EXPLORATORY OF ABDOMEN      PROCTOSIGMOIDOSCOPY N/A 2018    [facility] by [facility] at [facility] OR     Social History     Socioeconomic History    Marital status: Single     Spouse name: Not on file    Number of children: Not on file    Years of education: Not on file    Highest education level: Not on file   Occupational History    Not on file "   Social Needs    Financial resource strain: Not on file    Food insecurity:     Worry: Not on file     Inability: Not on file    Transportation needs:     Medical: Not on file     Non-medical: Not on file   Tobacco Use    Smoking status: Current Every Day Smoker     Packs/day: 1.00    Smokeless tobacco: Never Used   Substance and Sexual Activity    Alcohol use: Yes     Alcohol/week: 0.0 oz     Comment: occassionally    Drug use: Yes     Types: Marijuana    Sexual activity: Yes     Partners: Male     Birth control/protection: None   Lifestyle    Physical activity:     Days per week: Not on file     Minutes per session: Not on file    Stress: Not on file   Relationships    Social connections:     Talks on phone: Not on file     Gets together: Not on file     Attends [employer] service: Not on file     Active member of club or organization: Not on file     Attends meetings of clubs or organizations: Not on file     Relationship status: Not on file   Other Topics Concern    Not on file   Social History Narrative    She has a high school diploma. Patient is currently not working. She has been together with the same person for over 3 years. He works as a .              Family History   Problem Relation Age of Onset    Cancer Mother     Diabetes Father          Current Outpatient Medications   Medication Sig Dispense Refill    ibuprofen (ADVIL,MOTRIN) 600 MG tablet Take 1 tablet (600 mg total) by mouth 3 (three) times daily. 60 tablet 2    NEXPLANON 68 mg Impl subdermal device       betamethasone dipropionate (DIPROLENE) 0.05 % lotion Apply topically 2 (two) times daily. 60 mL 0    dicyclomine (BENTYL) 20 mg tablet Take 1 tablet (20 mg total) by mouth 3 (three) times daily as needed. 30 tablet 0    phenazopyridine (PYRIDIUM) 100 MG tablet Take 2 tablets (200 mg total) by mouth 3 (three) times daily with meals. 18 tablet 0     No current facility-administered medications for this visit.   "    Allergies: Naproxen       ROS:  GENERAL: Denies weight gain or weight loss. Feeling well overall.   SKIN: Denies rash or lesions.   HEAD: Denies head injury or headache.   NODES: Denies enlarged lymph nodes.   CHEST: Denies chest pain or shortness of breath.   CARDIOVASCULAR: Denies palpitations or left sided chest pain.   ABDOMEN: No abdominal pain, constipation, diarrhea, nausea, vomiting or rectal bleeding.   URINARY: No frequency, dysuria, hematuria, or burning on urination.  REPRODUCTIVE: Denies  abnormal vaginal bleeding, lesions, pelvic pain. + vaginal discharge  BREASTS: The patient performs breast self-examination and denies pain, lumps, or nipple discharge.   HEMATOLOGIC: No easy bruisability or excessive bleeding.  MUSCULOSKELETAL: Denies joint pain or swelling.   NEUROLOGIC: Denies syncope or weakness.   PSYCHIATRIC: Denies depression, anxiety or mood swings.      OBJECTIVE:   /82 (BP Location: Left arm, Patient Position: Sitting, BP Method: Medium (Manual))   Ht 5' 5" (1.651 m)   Wt 76.5 kg (168 lb 10.4 oz)   LMP 04/29/2019 (Approximate)   BMI 28.07 kg/m²   The patient appears well, alert, oriented x 3, in no distress.  NECK: negative, no thyromegaly, trachea midline  SKIN: normal, good color, good turgor and no acne, striae, hirsutism  BREAST EXAM: not examined  ABDOMEN: soft, non-tender; bowel sounds normal; no masses,  no organomegaly and no hernias, masses, or hepatosplenomegaly  BLADDER: soft  GENITALIA: normal external genitalia, no erythema, no discharge  URETHRA: normal appearing urethra with no masses, tenderness or lesions and normal urethra, normal urethral meatus  VAGINA: normal. Scant dark blood  CERVIX: no lesions or cervical motion tenderness  UTERUS: normal size, contour, position, consistency, mobility, non-tender  ADNEXA: no mass, fullness, tenderness      ASSESSMENT:   1. Check GC/CT and affirm  2.  Exam normal, advised sitz bath and vaseline/coconut oil for comfort  2.  " Check urine culture - if negative - will refill pyridium.

## 2019-05-21 LAB
BACTERIAL VAGINOSIS DNA: POSITIVE
C TRACH DNA SPEC QL NAA+PROBE: NOT DETECTED
CANDIDA GLABRATA DNA: NEGATIVE
CANDIDA KRUSEI DNA: NEGATIVE
CANDIDA RRNA VAG QL PROBE: NEGATIVE
N GONORRHOEA DNA SPEC QL NAA+PROBE: NOT DETECTED
T VAGINALIS RRNA GENITAL QL PROBE: NEGATIVE

## 2019-05-22 ENCOUNTER — TELEPHONE (OUTPATIENT)
Dept: OBSTETRICS AND GYNECOLOGY | Facility: CLINIC | Age: 29
End: 2019-05-22

## 2019-05-22 LAB — BACTERIA UR CULT: NORMAL

## 2019-05-22 RX ORDER — SULFAMETHOXAZOLE AND TRIMETHOPRIM 400; 80 MG/1; MG/1
1 TABLET ORAL 2 TIMES DAILY
Qty: 6 TABLET | Refills: 0 | Status: SHIPPED | OUTPATIENT
Start: 2019-05-22 | End: 2019-05-25

## 2019-05-22 RX ORDER — METRONIDAZOLE 500 MG/1
500 TABLET ORAL EVERY 12 HOURS
Qty: 14 TABLET | Refills: 0 | Status: SHIPPED | OUTPATIENT
Start: 2019-05-22 | End: 2019-05-29

## 2019-05-22 NOTE — TELEPHONE ENCOUNTER
Patient informed of infection and advised about prescriptions sent to pharmacy.     ----- [facility] Viera Mai, MD sent at 5/22/2019  1:35 PM CDT -----  Pt has a bladder infection and BV  rx for bactrim x 3 days then she should take flagyl bid x7 days

## 2019-05-27 ENCOUNTER — HOSPITAL ENCOUNTER (EMERGENCY)
Facility: HOSPITAL | Age: 29
Discharge: HOME | End: 2019-05-27
Attending: EMERGENCY MEDICINE
Payer: MEDICAID

## 2019-05-27 VITALS
SYSTOLIC BLOOD PRESSURE: 119 MMHG | HEIGHT: 65 IN | TEMPERATURE: 99 F | BODY MASS INDEX: 27.82 KG/M2 | DIASTOLIC BLOOD PRESSURE: 83 MMHG | RESPIRATION RATE: 17 BRPM | HEART RATE: 79 BPM | OXYGEN SATURATION: 100 % | WEIGHT: 167 LBS

## 2019-05-27 DIAGNOSIS — R51.9 NONINTRACTABLE HEADACHE, UNSPECIFIED CHRONICITY PATTERN, UNSPECIFIED HEADACHE TYPE: Primary | ICD-10-CM

## 2019-05-27 LAB
ALBUMIN SERPL-MCNC: 3.9 G/DL (ref 3.3–5.5)
ALP SERPL-CCNC: 36 U/L (ref 42–141)
B-HCG UR QL: NEGATIVE
BILIRUB SERPL-MCNC: 0.6 MG/DL (ref 0.2–1.6)
BILIRUBIN, POC UA: NEGATIVE
BLOOD, POC UA: NEGATIVE
BUN SERPL-MCNC: 11 MG/DL (ref 7–22)
CALCIUM SERPL-MCNC: 9.2 MG/DL (ref 8–10.3)
CHLORIDE SERPL-SCNC: 107 MMOL/L (ref 98–108)
CLARITY, POC UA: CLEAR
COLOR, POC UA: YELLOW
CREAT SERPL-MCNC: 0.9 MG/DL (ref 0.6–1.2)
CTP QC/QA: YES
GLUCOSE SERPL-MCNC: 89 MG/DL (ref 73–118)
GLUCOSE, POC UA: NEGATIVE
KETONES, POC UA: NEGATIVE
LEUKOCYTE EST, POC UA: ABNORMAL
NITRITE, POC UA: NEGATIVE
PH UR STRIP: 8.5 [PH]
POC ALT (SGPT): 30 U/L (ref 10–47)
POC AST (SGOT): 27 U/L (ref 11–38)
POC TCO2: 27 MMOL/L (ref 18–33)
POTASSIUM BLD-SCNC: 4.2 MMOL/L (ref 3.6–5.1)
PROTEIN, POC UA: NEGATIVE
PROTEIN, POC: 7.5 G/DL (ref 6.4–8.1)
SODIUM BLD-SCNC: 139 MMOL/L (ref 128–145)
SPECIFIC GRAVITY, POC UA: 1.01
UROBILINOGEN, POC UA: 0.2 E.U./DL

## 2019-05-27 PROCEDURE — 63600175 PHARM REV CODE 636 W HCPCS: Mod: ER | Performed by: EMERGENCY MEDICINE

## 2019-05-27 PROCEDURE — 81003 URINALYSIS AUTO W/O SCOPE: CPT | Mod: ER

## 2019-05-27 PROCEDURE — 81025 URINE PREGNANCY TEST: CPT | Mod: ER | Performed by: EMERGENCY MEDICINE

## 2019-05-27 PROCEDURE — 96361 HYDRATE IV INFUSION ADD-ON: CPT | Mod: ER

## 2019-05-27 PROCEDURE — 99284 EMERGENCY DEPT VISIT MOD MDM: CPT | Mod: 25,ER

## 2019-05-27 PROCEDURE — 96374 THER/PROPH/DIAG INJ IV PUSH: CPT | Mod: ER

## 2019-05-27 PROCEDURE — 25000003 PHARM REV CODE 250: Mod: ER | Performed by: EMERGENCY MEDICINE

## 2019-05-27 PROCEDURE — 85025 COMPLETE CBC W/AUTO DIFF WBC: CPT | Mod: ER

## 2019-05-27 PROCEDURE — 80053 COMPREHEN METABOLIC PANEL: CPT | Mod: ER

## 2019-05-27 RX ORDER — PROCHLORPERAZINE EDISYLATE 5 MG/ML
10 INJECTION INTRAMUSCULAR; INTRAVENOUS ONCE
Status: DISCONTINUED | OUTPATIENT
Start: 2019-05-27 | End: 2019-05-27

## 2019-05-27 RX ORDER — PROCHLORPERAZINE EDISYLATE 5 MG/ML
10 INJECTION INTRAMUSCULAR; INTRAVENOUS ONCE
Status: COMPLETED | OUTPATIENT
Start: 2019-05-27 | End: 2019-05-27

## 2019-05-27 RX ADMIN — SODIUM CHLORIDE 1000 ML: 0.9 INJECTION, SOLUTION INTRAVENOUS at 10:05

## 2019-05-27 RX ADMIN — PROCHLORPERAZINE EDISYLATE 10 MG: 5 INJECTION INTRAMUSCULAR; INTRAVENOUS at 10:05

## 2019-05-27 NOTE — DISCHARGE INSTRUCTIONS
Rest.  Drink plenty of fluids.  Return here at any time.  Call your doctor for close follow-up.  Alternate acetaminophen and ibuprofen as needed for symptoms

## 2019-05-27 NOTE — ED PROVIDER NOTES
"Encounter Date: 5/27/2019    SCRIBE #1 NOTE: I, Jesse Solis, am scribing for, and in the presence of,  Dr. Mooney. I have scribed the following portions of the note - Other sections scribed: HPI, ROS, PE.       History     Chief Complaint   Patient presents with    Headache     onset this am, states took tramadol for headache and ibuprofen at 0600 for fever of 101 at home    Fever     CC:   Emesis  HPI:  This is a 28 y.o. female who presents to the ED with a chief complaint of  emesis that began this morning.  Pt states that she woke up this morning "feeling dry, hot, and nauseous" and drank 3 bottles of water, causing her to vomit three times.  Pt also endorses a "pounding" bi-temporal HA that began after she threw up.  Pt states that she normally gets migraine HAs, but notes her HA today is in a different location from her typical migraines.  She rates her HA pain a nine out of ten in severity.  She has taken ibuprofen and tramadol at 6 a.m today without relief.  She endorses  fever, rhinorrhea with clear mucus, sneezing, coughing, nontraumatic left lower rib pain, and dizziness when standing.  She has not taken cold or allergy medication PTA.  Pt was recently dx 'd with a bladder and vaginal infection and completed 3 days of Abx and is currently taking Flagyl.  Pt denies visual disturbances, SOB, or rash. She also complains of pain to her lower ribs.  No coughing or shortness of breath     The history is provided by the patient.     Review of patient's allergies indicates:   Allergen Reactions    Naproxen Hives     Past Medical History:   Diagnosis Date    Interstitial cystitis     Kidney stone     Migraine     UTI (urinary tract infection)      Past Surgical History:   Procedure Laterality Date    ABDOMINAL SURGERY      CYSTOSCOPY, WITH RETROGRADE PYELOGRAM; hydrodistention Bilateral 1/4/2019    [facility]    Exam under anesthesia N/A 12/17/2018    Performed " by [facility] at [facility] OR    [place] EXPLORATORY OF ABDOMEN      PROCTOSIGMOIDOSCOPY N/A 12/17/2018    Per[facility] by [facility]a [facility] at [facility] OR     Family History   Problem Relation Age of Onset    Cancer Mother     Diabetes Father      Social History     Tobacco Use    Smoking status: Current Every Day Smoker     Packs/day: 1.00    Smokeless tobacco: Never Used   Substance Use Topics    Alcohol use: Yes     Alcohol/week: 0.0 oz     Comment: occassionally    Drug use: Yes     Types: Marijuana     Review of Systems   Constitutional: Positive for fever.   HENT: Positive for rhinorrhea and sneezing.    Eyes: Negative for visual disturbance.   Respiratory: Positive for cough. Negative for shortness of breath.    Cardiovascular: Positive for chest pain.   Gastrointestinal: Positive for nausea and vomiting.   Genitourinary: Negative for dysuria and hematuria.   Musculoskeletal: Negative for neck pain and neck stiffness.   Skin: Negative for rash.   Neurological: Positive for dizziness and headaches.   All other systems reviewed and are negative.      Physical Exam     Initial Vitals [05/27/19 0857]   BP Pulse Resp Temp SpO2   119/83 79 17 98.6 °F (37 °C) 100 %      MAP       --         Physical Exam    Nursing note and vitals reviewed.  Constitutional: She appears well-developed and well-nourished.   HENT:   Head: Normocephalic and atraumatic.   Mouth/Throat: Uvula is midline, oropharynx is clear and moist and mucous membranes are normal. No oropharyngeal exudate, posterior oropharyngeal edema, posterior oropharyngeal erythema or tonsillar abscesses.   Eyes: Conjunctivae and EOM are normal. Pupils are equal, round, and reactive to light.   Neck: Trachea normal, normal range of motion and full passive range of motion without pain. Neck supple. No neck rigidity. No Brudzinski's sign and no Kernig's sign noted.   Cardiovascular: Normal rate, regular rhythm, normal heart sounds and intact distal pulses. Exam  "reveals no gallop and no friction rub.    No murmur heard.  Pulmonary/Chest: Effort normal and breath sounds normal. No respiratory distress. She has no wheezes. She has no rhonchi. She has no rales. She exhibits tenderness.       Abdominal: Soft. Bowel sounds are normal. She exhibits no distension and no mass. There is no tenderness. There is no rebound and no guarding.   Musculoskeletal: Normal range of motion.   Neurological: She is alert and oriented to person, place, and time.   Skin: Skin is warm and dry. No rash noted.   Psychiatric: She has a normal mood and affect.         ED Course   Procedures  Labs Reviewed   POCT URINALYSIS W/O SCOPE - Abnormal; Notable for the following components:       Result Value    Glucose, UA Negative (*)     Bilirubin, UA Negative (*)     Ketones, UA Negative (*)     Blood, UA Negative (*)     Protein, UA Negative (*)     Nitrite, UA Negative (*)     Leukocytes, UA Trace (*)     All other components within normal limits   POCT URINE PREGNANCY   POCT URINALYSIS W/O SCOPE   POCT CBC   POCT CMP          Imaging Results    None          Medical Decision Making:   Initial Assessment:   This is a 28 y.o. female who presents to the ED with a chief complaint of nausea, emesis, a "pounding" HA, subjective fever, rhinorrhea with clear sputum, sneezing, coughing, left lower rib pain, and dizziness when standing.       The pt's physical examination was significant for left lower rib tenderness to palpation.    Clinical Tests:   Lab Tests: Ordered  ED Management:  I will order POCT CMP, CBC, UA W/O SCOPE, UPT, and a saline lock IV.     Patient will be treated with prochlorperazine and sodium chloride bolus.  Patient's workup showed no significant abnormalities.  Her symptoms improved after Compazine.            Scribe Attestation:   Scribe #1: I performed the above scribed service and the documentation accurately describes the services I performed. I attest to the accuracy of the " note.       I, Dr. Isabel Mooney, personally performed the services described in this documentation. All medical record entries made by the scribe were at my direction and in my presence.  I have reviewed the chart and agree that the record reflects my personal performance and is accurate and complete. Isabel Mooney MD.  2:47 PM 05/27/2019             Clinical Impression:     1. Nonintractable headache, unspecified chronicity pattern, unspecified headache type                                  Isabel Mooney MD  05/27/19 6046

## 2019-05-28 ENCOUNTER — TELEPHONE (OUTPATIENT)
Dept: OBSTETRICS AND GYNECOLOGY | Facility: CLINIC | Age: 29
End: 2019-05-28

## 2019-07-30 ENCOUNTER — TELEPHONE (OUTPATIENT)
Dept: UROLOGY | Facility: CLINIC | Age: 29
End: 2019-07-30

## 2019-07-30 ENCOUNTER — LAB VISIT (OUTPATIENT)
Dept: LAB | Facility: HOSPITAL | Age: 29
End: 2019-07-30
Attending: OBSTETRICS & GYNECOLOGY
Payer: MEDICAID

## 2019-07-30 DIAGNOSIS — R30.0 DYSURIA: Primary | ICD-10-CM

## 2019-07-30 DIAGNOSIS — R30.0 DYSURIA: ICD-10-CM

## 2019-07-30 PROCEDURE — 87086 URINE CULTURE/COLONY COUNT: CPT

## 2019-07-30 NOTE — TELEPHONE ENCOUNTER
----- Message from Elisa Ochoa sent at 7/29/2019 11:12 AM CDT -----  Contact: Self  Type:  Sooner Appointment Request    Patient is requesting a sooner appointment.  Patient declined first available appointment listed as well as another facility and provider .  Patient will not accept being placed on the waitlist and is requesting a message be sent to doctor.    Name of Caller: self  When is the first available appointment? 09/27/19    Symptoms: burning urination    Would the patient rather a call back or a response via [facility]? call    Best Call Back Number: [phone]

## 2019-07-30 NOTE — TELEPHONE ENCOUNTER
Please advise on patient - next available is in September- do you wish to see patient or can we place an order for Ucx?

## 2019-08-01 ENCOUNTER — TELEPHONE (OUTPATIENT)
Dept: UROLOGY | Facility: CLINIC | Age: 29
End: 2019-08-01

## 2019-08-01 LAB — BACTERIA UR CULT: NORMAL

## 2019-08-01 RX ORDER — PHENAZOPYRIDINE HYDROCHLORIDE 100 MG/1
200 TABLET, FILM COATED ORAL
Qty: 20 TABLET | Refills: 2 | Status: SHIPPED | OUTPATIENT
Start: 2019-08-01 | End: 2019-12-28 | Stop reason: CLARIF

## 2019-08-01 NOTE — TELEPHONE ENCOUNTER
Spoke to pt advised urine culture was negative for infection so no antibiotics is needed at this time. I also advised pt per np zee she could be having a ic flare to stay away from bladder irrants, increase water intake an okay to take uribel/pyridium. Pt is asking can a new rx be called into the pharmacy for pyridium. Please advise-catie

## 2019-08-01 NOTE — TELEPHONE ENCOUNTER
Recent urine culture was negative for a UTI. No need for antibiotics at this time. She does have diagnosis of IC and may be experiencing a flare. Advise patient to avoid bladder irritants, increase water intake, uribel/pyridium prn

## 2019-08-31 ENCOUNTER — HOSPITAL ENCOUNTER (EMERGENCY)
Facility: HOSPITAL | Age: 29
Discharge: HOME | End: 2019-08-31
Attending: INTERNAL MEDICINE
Payer: MEDICAID

## 2019-08-31 VITALS
WEIGHT: 180 LBS | RESPIRATION RATE: 16 BRPM | HEIGHT: 65 IN | DIASTOLIC BLOOD PRESSURE: 80 MMHG | BODY MASS INDEX: 29.99 KG/M2 | TEMPERATURE: 99 F | OXYGEN SATURATION: 100 % | SYSTOLIC BLOOD PRESSURE: 114 MMHG | HEART RATE: 82 BPM

## 2019-08-31 DIAGNOSIS — N89.8 VAGINAL DISCHARGE: Primary | ICD-10-CM

## 2019-08-31 LAB
ALBUMIN SERPL-MCNC: 3.6 G/DL (ref 3.3–5.5)
ALP SERPL-CCNC: 37 U/L (ref 42–141)
B-HCG UR QL: NEGATIVE
BILIRUB SERPL-MCNC: 0.4 MG/DL (ref 0.2–1.6)
BILIRUBIN, POC UA: NEGATIVE
BLOOD, POC UA: ABNORMAL
BUN SERPL-MCNC: 15 MG/DL (ref 7–22)
CALCIUM SERPL-MCNC: 9 MG/DL (ref 8–10.3)
CHLORIDE SERPL-SCNC: 107 MMOL/L (ref 98–108)
CLARITY, POC UA: CLEAR
COLOR, POC UA: YELLOW
CREAT SERPL-MCNC: 0.9 MG/DL (ref 0.6–1.2)
CTP QC/QA: YES
GLUCOSE SERPL-MCNC: 92 MG/DL (ref 73–118)
GLUCOSE, POC UA: NEGATIVE
KETONES, POC UA: NEGATIVE
LEUKOCYTE EST, POC UA: NEGATIVE
NITRITE, POC UA: NEGATIVE
PH UR STRIP: 6 [PH]
POC ALT (SGPT): 26 U/L (ref 10–47)
POC AST (SGOT): 32 U/L (ref 11–38)
POC TCO2: 26 MMOL/L (ref 18–33)
POTASSIUM BLD-SCNC: 4.2 MMOL/L (ref 3.6–5.1)
PROTEIN, POC UA: NEGATIVE
PROTEIN, POC: 6.9 G/DL (ref 6.4–8.1)
SODIUM BLD-SCNC: 136 MMOL/L (ref 128–145)
SPECIFIC GRAVITY, POC UA: >=1.03
UROBILINOGEN, POC UA: 0.2 E.U./DL

## 2019-08-31 PROCEDURE — 87801 DETECT AGNT MULT DNA AMPLI: CPT

## 2019-08-31 PROCEDURE — 87481 CANDIDA DNA AMP PROBE: CPT | Mod: 59

## 2019-08-31 PROCEDURE — 87661 TRICHOMONAS VAGINALIS AMPLIF: CPT

## 2019-08-31 PROCEDURE — 80053 COMPREHEN METABOLIC PANEL: CPT | Mod: ER

## 2019-08-31 PROCEDURE — 87491 CHLMYD TRACH DNA AMP PROBE: CPT | Mod: 59

## 2019-08-31 PROCEDURE — 99284 EMERGENCY DEPT VISIT MOD MDM: CPT | Mod: 25,ER

## 2019-08-31 PROCEDURE — 96361 HYDRATE IV INFUSION ADD-ON: CPT | Mod: ER

## 2019-08-31 PROCEDURE — 25000003 PHARM REV CODE 250: Mod: ER | Performed by: NURSE PRACTITIONER

## 2019-08-31 PROCEDURE — 81003 URINALYSIS AUTO W/O SCOPE: CPT | Mod: ER

## 2019-08-31 PROCEDURE — 96372 THER/PROPH/DIAG INJ SC/IM: CPT | Mod: 59,ER

## 2019-08-31 PROCEDURE — 81025 URINE PREGNANCY TEST: CPT | Mod: ER | Performed by: EMERGENCY MEDICINE

## 2019-08-31 PROCEDURE — 96374 THER/PROPH/DIAG INJ IV PUSH: CPT | Mod: ER

## 2019-08-31 PROCEDURE — 85025 COMPLETE CBC W/AUTO DIFF WBC: CPT | Mod: ER

## 2019-08-31 PROCEDURE — 63600175 PHARM REV CODE 636 W HCPCS: Mod: ER | Performed by: NURSE PRACTITIONER

## 2019-08-31 RX ORDER — CEFTRIAXONE 250 MG/1
250 INJECTION, POWDER, FOR SOLUTION INTRAMUSCULAR; INTRAVENOUS
Status: COMPLETED | OUTPATIENT
Start: 2019-08-31 | End: 2019-08-31

## 2019-08-31 RX ORDER — IBUPROFEN 800 MG/1
800 TABLET ORAL EVERY 6 HOURS PRN
Qty: 20 TABLET | Refills: 0 | Status: SHIPPED | OUTPATIENT
Start: 2019-08-31 | End: 2020-04-16

## 2019-08-31 RX ORDER — SODIUM CHLORIDE 9 MG/ML
1000 INJECTION, SOLUTION INTRAVENOUS
Status: COMPLETED | OUTPATIENT
Start: 2019-08-31 | End: 2019-08-31

## 2019-08-31 RX ORDER — KETOROLAC TROMETHAMINE 30 MG/ML
15 INJECTION, SOLUTION INTRAMUSCULAR; INTRAVENOUS
Status: COMPLETED | OUTPATIENT
Start: 2019-08-31 | End: 2019-08-31

## 2019-08-31 RX ORDER — AZITHROMYCIN 250 MG/1
1000 TABLET, FILM COATED ORAL ONCE
Status: COMPLETED | OUTPATIENT
Start: 2019-08-31 | End: 2019-08-31

## 2019-08-31 RX ORDER — AZITHROMYCIN 250 MG/1
1000 TABLET, FILM COATED ORAL DAILY
Status: DISCONTINUED | OUTPATIENT
Start: 2019-08-31 | End: 2019-08-31

## 2019-08-31 RX ORDER — METRONIDAZOLE 500 MG/1
500 TABLET ORAL EVERY 12 HOURS
Qty: 14 TABLET | Refills: 0 | Status: SHIPPED | OUTPATIENT
Start: 2019-08-31 | End: 2019-09-07

## 2019-08-31 RX ORDER — AZITHROMYCIN 250 MG/1
1000 TABLET, FILM COATED ORAL DAILY
Status: DISCONTINUED | OUTPATIENT
Start: 2019-09-01 | End: 2019-08-31

## 2019-08-31 RX ADMIN — KETOROLAC TROMETHAMINE 15 MG: 30 INJECTION, SOLUTION INTRAMUSCULAR at 08:08

## 2019-08-31 RX ADMIN — SODIUM CHLORIDE 1000 ML: 0.9 INJECTION, SOLUTION INTRAVENOUS at 08:08

## 2019-08-31 RX ADMIN — AZITHROMYCIN MONOHYDRATE 1000 MG: 250 TABLET ORAL at 09:08

## 2019-08-31 RX ADMIN — CEFTRIAXONE SODIUM 250 MG: 250 INJECTION, POWDER, FOR SOLUTION INTRAMUSCULAR; INTRAVENOUS at 09:08

## 2019-09-01 NOTE — ED PROVIDER NOTES
"Encounter Date: 8/31/2019       History     Chief Complaint   Patient presents with    Abdominal Pain     lower abd pain/pelvic pain since yesterday, staro had cramps and was at [employer]- caught a cramp and bent over- upon standing up"felt something pop out my abd wall, feels like a ball"     A 28-year-old female who presents to the ED with complaints of pelvic pain since yesterday.  Patient reports standing up at work earlier and filling a pop.  Patient denies dysuria or vaginal discharge. Patient reports a history of ovarian cyst but cannot remember which side the cyst were on.     The history is provided by the patient.   Female  Problem   Primary symptoms include no fever.   Primary symptoms comment: pelvic seng n. The current episode started yesterday. The problem occurs 2 - 4 times per day. The problem has been gradually worsening. Pertinent negatives include no abdominal pain, no nausea and no vomiting. She has tried nothing for the symptoms. Sexual activity: sexually active.     Review of patient's allergies indicates:   Allergen Reactions    Naproxen Hives     Past Medical History:   Diagnosis Date    Interstitial cystitis     Kidney stone     Migraine     UTI (urinary tract infection)      Past Surgical History:   Procedure Laterality Date    ABDOMINAL SURGERY      CYSTOSCOPY, WITH RETROGRADE PYELOGRAM; hydrodistention Bilateral 1/4/2019    Performed by [facility] at [facility] OR    Exam under anesthesia N/A 12/17/2018    Performed by [facility]ilson Santana MD at [facility] OR    [place] EXPLORATORY OF ABDOMEN      PROCTOSIGMOIDOSCOPY N/A 12/17/2018    Performed by [facility]son [facility]ana MD at [facility] OR     Family History   Problem Relation Age of Onset    Cancer Mother     Diabetes Father      Social History     Tobacco Use    Smoking status: Current Every Day Smoker     Packs/day: 1.00    Smokeless tobacco: Never Used   Substance Use Topics    Alcohol use: Yes     Alcohol/week: 0.0 oz     Comment: " occassionally    Drug use: Yes     Types: Marijuana     Review of Systems   Constitutional: Negative.  Negative for fever.   HENT: Negative.    Eyes: Negative.    Respiratory: Negative.  Negative for shortness of breath.    Cardiovascular: Negative.  Negative for chest pain.   Gastrointestinal: Negative for abdominal pain, nausea and vomiting.        Pelvic pain    Endocrine: Negative.    Genitourinary: Negative.    Musculoskeletal: Negative.    Skin: Negative.    Allergic/Immunologic: Negative.    Neurological: Negative.    Hematological: Negative.    All other systems reviewed and are negative.      Physical Exam     Initial Vitals [08/31/19 1824]   BP Pulse Resp Temp SpO2   118/67 99 19 99.2 °F (37.3 °C) 99 %      MAP       --         Physical Exam    Nursing note and vitals reviewed.  Constitutional: Vital signs are normal. She appears well-developed. She is cooperative. She does not appear ill.   HENT:   Right Ear: External ear normal.   Left Ear: External ear normal.   Mouth/Throat: Oropharynx is clear and moist.   Eyes: Conjunctivae and lids are normal.   Neck: Normal range of motion. Neck supple.   Cardiovascular: Normal rate, regular rhythm, S1 normal, S2 normal and normal heart sounds. Exam reveals no gallop.    No murmur heard.  Pulmonary/Chest: Effort normal and breath sounds normal. No respiratory distress.   Abdominal: Soft. Normal appearance and bowel sounds are normal. There is tenderness (left pelvis ). There is no CVA tenderness.       Genitourinary: Uterus normal. There is no rash, tenderness or lesion on the right labia. There is no rash or tenderness on the left labia. Cervix exhibits motion tenderness and discharge (white ). Right adnexum displays no mass, no tenderness and no fullness. Left adnexum displays no mass, no tenderness and no fullness. Vaginal discharge (thick white discharge) found.   Genitourinary Comments: Witnessed by Deann Huff RN   Musculoskeletal: Normal range of motion.    From of all extremities   Neurological: She is alert and oriented to person, place, and time.   Skin: Skin is warm, dry and intact.   Psychiatric: She has a normal mood and affect. Her speech is normal. Thought content normal.         ED Course   Procedures  Labs Reviewed   POCT URINALYSIS W/O SCOPE - Abnormal; Notable for the following components:       Result Value    Glucose, UA Negative (*)     Bilirubin, UA Negative (*)     Ketones, UA Negative (*)     Spec Grav UA >=1.030 (*)     Blood, UA 1+ (*)     Protein, UA Negative (*)     Nitrite, UA Negative (*)     Leukocytes, UA Negative (*)     All other components within normal limits   POCT CMP - Abnormal; Notable for the following components:    Alkaline Phosphatase, POC 37 (*)     All other components within normal limits   VAGINOSIS SCREEN BY DNA PROBE   C. TRACHOMATIS/N. GONORRHOEAE BY AMP DNA   POCT URINE PREGNANCY   POCT URINALYSIS W/O SCOPE   POCT CBC   POCT CMP          Imaging Results          US Pelvis Complete Non OB (Final result)  Result time 08/31/19 21:16:15    Final result by Nacho Kim MD (08/31/19 21:16:15)                 Impression:      No significant abnormalities identified.      Electronically signed by: Nacho Kim MD  Date:    08/31/2019  Time:    21:16             Narrative:    EXAMINATION:  US PELVIS COMPLETE NON OB    CLINICAL HISTORY:  pelvic pain;    TECHNIQUE:  Transabdominal sonography of the pelvis was performed, followed by transvaginal sonography to better evaluate the uterus and ovaries.    COMPARISON:  None.    FINDINGS:  The uterus measures 8.3 x 4.6 x 5.1 cm. Uterine parenchyma is homogeneous without evidence for masses. The endometrial echo complex is normal in thickness and measures 3 mm.    The right ovary measures 4.3 x 3.8 x 3.2 cm. The left ovary measures 2.3 x 2.2 x 2.2 cm. Arterial and venous flow are preserved bilaterally.  Follicles are seen in both ovaries.  Small cyst or dominant follicle is seen  within the right ovary measuring 3.1 x 3.5 x 2.1 cm which is within normal limits in size for a patient of this age.  No significant free fluid is seen.                                 Medical Decision Making:   Initial Assessment:   A 28-year-old female who presents to the ED with complaints of pelvic pain since yesterday.  Patient reports standing up at work earlier and filling a pop.  Patient denies dysuria or vaginal discharge. Patient reports a history of ovarian cyst but cannot remember which side the cyst were on.     Differential Diagnosis:   Urinary tract infection, STD exposure, ovarian cysts, ovarian torsion  Clinical Tests:   Lab Tests: Ordered and Reviewed  ED Management:  Medicated with Toradol 15 mg IV.  Ceftriaxone 250 mg IM. Azithromycin 1 gm po.   Discharge home with Flagyl and Motrin.  Follow-up with GYN or PCP in 2 days.  Return ED for worsening of symptoms.                        Clinical Impression:       ICD-10-CM ICD-9-CM   1. Vaginal discharge N89.8 623.5                                Layla Bell, AMADOU  08/31/19 2153

## 2019-09-02 ENCOUNTER — HOSPITAL ENCOUNTER (EMERGENCY)
Facility: HOSPITAL | Age: 29
Discharge: HOME | End: 2019-09-02
Attending: EMERGENCY MEDICINE
Payer: MEDICAID

## 2019-09-02 VITALS
HEART RATE: 84 BPM | BODY MASS INDEX: 28.32 KG/M2 | OXYGEN SATURATION: 96 % | DIASTOLIC BLOOD PRESSURE: 73 MMHG | WEIGHT: 170 LBS | RESPIRATION RATE: 16 BRPM | SYSTOLIC BLOOD PRESSURE: 115 MMHG | TEMPERATURE: 99 F | HEIGHT: 65 IN

## 2019-09-02 DIAGNOSIS — M54.32 CHRONIC SCIATICA OF LEFT SIDE: Primary | ICD-10-CM

## 2019-09-02 LAB
B-HCG UR QL: NEGATIVE
C TRACH DNA SPEC QL NAA+PROBE: NOT DETECTED
CTP QC/QA: YES
N GONORRHOEA DNA SPEC QL NAA+PROBE: NOT DETECTED

## 2019-09-02 PROCEDURE — 63600175 PHARM REV CODE 636 W HCPCS: Performed by: NURSE PRACTITIONER

## 2019-09-02 PROCEDURE — 96372 THER/PROPH/DIAG INJ SC/IM: CPT

## 2019-09-02 PROCEDURE — 99284 EMERGENCY DEPT VISIT MOD MDM: CPT | Mod: 25

## 2019-09-02 PROCEDURE — 81025 URINE PREGNANCY TEST: CPT | Performed by: PHYSICIAN ASSISTANT

## 2019-09-02 RX ORDER — ONDANSETRON 4 MG/1
8 TABLET, FILM COATED ORAL 2 TIMES DAILY
COMMUNITY
End: 2019-12-28 | Stop reason: CLARIF

## 2019-09-02 RX ORDER — PREDNISONE 20 MG/1
40 TABLET ORAL DAILY
Qty: 8 TABLET | Refills: 0 | Status: SHIPPED | OUTPATIENT
Start: 2019-09-03 | End: 2019-09-07

## 2019-09-02 RX ORDER — DEXAMETHASONE SODIUM PHOSPHATE 4 MG/ML
12 INJECTION, SOLUTION INTRA-ARTICULAR; INTRALESIONAL; INTRAMUSCULAR; INTRAVENOUS; SOFT TISSUE
Status: COMPLETED | OUTPATIENT
Start: 2019-09-02 | End: 2019-09-02

## 2019-09-02 RX ORDER — KETOROLAC TROMETHAMINE 30 MG/ML
15 INJECTION, SOLUTION INTRAMUSCULAR; INTRAVENOUS
Status: COMPLETED | OUTPATIENT
Start: 2019-09-02 | End: 2019-09-02

## 2019-09-02 RX ADMIN — KETOROLAC TROMETHAMINE 15 MG: 30 INJECTION, SOLUTION INTRAMUSCULAR; INTRAVENOUS at 04:09

## 2019-09-02 RX ADMIN — DEXAMETHASONE SODIUM PHOSPHATE 12 MG: 4 INJECTION, SOLUTION INTRA-ARTICULAR; INTRALESIONAL; INTRAMUSCULAR; INTRAVENOUS; SOFT TISSUE at 04:09
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
## 2019-09-02 NOTE — DISCHARGE INSTRUCTIONS
Please return to the Emergency Department for any new or worsening symptoms including: fever, chest pain, shortness of breath, loss of consciousness, dizziness, weakness, or any other concerns.     Please follow up with your Primary Care Provider within in the week. If you do not have one, you may contact the one listed on your discharge paperwork or you may also call the [facility] Appointment Desk at [phone] to schedule an appointment with one.

## 2019-09-03 LAB
BACTERIAL VAGINOSIS DNA: POSITIVE
CANDIDA GLABRATA DNA: NEGATIVE
CANDIDA KRUSEI DNA: NEGATIVE
CANDIDA RRNA VAG QL PROBE: NEGATIVE
T VAGINALIS RRNA GENITAL QL PROBE: NEGATIVE

## 2019-11-12 ENCOUNTER — HOSPITAL ENCOUNTER (EMERGENCY)
Facility: HOSPITAL | Age: 29
Discharge: HOME | End: 2019-11-12
Attending: EMERGENCY MEDICINE

## 2019-11-12 VITALS
RESPIRATION RATE: 18 BRPM | OXYGEN SATURATION: 99 % | WEIGHT: 165 LBS | SYSTOLIC BLOOD PRESSURE: 106 MMHG | HEART RATE: 76 BPM | TEMPERATURE: 98 F | DIASTOLIC BLOOD PRESSURE: 74 MMHG | HEIGHT: 65 IN | BODY MASS INDEX: 27.49 KG/M2

## 2019-11-12 DIAGNOSIS — J06.9 UPPER RESPIRATORY TRACT INFECTION, UNSPECIFIED TYPE: Primary | ICD-10-CM

## 2019-11-12 DIAGNOSIS — R05.9 COUGH: ICD-10-CM

## 2019-11-12 LAB
B-HCG UR QL: NEGATIVE
CTP QC/QA: YES
POC MOLECULAR INFLUENZA A AGN: NEGATIVE
POC MOLECULAR INFLUENZA B AGN: NEGATIVE
S PYO RRNA THROAT QL PROBE: NEGATIVE

## 2019-11-12 PROCEDURE — 87081 CULTURE SCREEN ONLY: CPT

## 2019-11-12 PROCEDURE — 96372 THER/PROPH/DIAG INJ SC/IM: CPT | Mod: ER

## 2019-11-12 PROCEDURE — 63600175 PHARM REV CODE 636 W HCPCS: Mod: ER | Performed by: EMERGENCY MEDICINE

## 2019-11-12 PROCEDURE — 87880 STREP A ASSAY W/OPTIC: CPT | Mod: ER

## 2019-11-12 PROCEDURE — 25000003 PHARM REV CODE 250: Mod: ER | Performed by: EMERGENCY MEDICINE

## 2019-11-12 PROCEDURE — 87502 INFLUENZA DNA AMP PROBE: CPT | Mod: ER

## 2019-11-12 PROCEDURE — 99284 EMERGENCY DEPT VISIT MOD MDM: CPT | Mod: 25,ER

## 2019-11-12 PROCEDURE — 81025 URINE PREGNANCY TEST: CPT | Mod: ER | Performed by: EMERGENCY MEDICINE

## 2019-11-12 PROCEDURE — 87804 INFLUENZA ASSAY W/OPTIC: CPT | Mod: ER

## 2019-11-12 RX ORDER — ACETAMINOPHEN 500 MG
1000 TABLET ORAL
Status: COMPLETED | OUTPATIENT
Start: 2019-11-12 | End: 2019-11-12

## 2019-11-12 RX ORDER — DEXAMETHASONE SODIUM PHOSPHATE 4 MG/ML
8 INJECTION, SOLUTION INTRA-ARTICULAR; INTRALESIONAL; INTRAMUSCULAR; INTRAVENOUS; SOFT TISSUE
Status: COMPLETED | OUTPATIENT
Start: 2019-11-12 | End: 2019-11-12

## 2019-11-12 RX ADMIN — DEXAMETHASONE SODIUM PHOSPHATE 8 MG: 4 INJECTION, SOLUTION INTRA-ARTICULAR; INTRALESIONAL; INTRAMUSCULAR; INTRAVENOUS; SOFT TISSUE at 12:11

## 2019-11-12 RX ADMIN — ACETAMINOPHEN 1000 MG: 500 TABLET ORAL at 10:11

## 2019-11-12 NOTE — ED PROVIDER NOTES
Encounter Date: 11/12/2019       History     Chief Complaint   Patient presents with    Sore Throat     pt stated bby have the flu    Influenza     This is a 29 year old female with no significant past medical history comes in complaining of cough, congestion, sore throat and fever.  Patient reports that her 12-year-old has the flu but her infant child does not.  She reports that symptoms started yesterday.  Cough has been nonproductive.  She woke up today and she could not talk.  She has not taken any medications.  She reports subjective fevers and chills. She denies any chest pain or shortness of breath. She denies any difficulty swallowing.  No exacerbating or alleviating factors otherwise.        Review of patient's allergies indicates:   Allergen Reactions    Naproxen Hives     Past Medical History:   Diagnosis Date    Interstitial cystitis     Kidney stone     Migraine     UTI (urinary tract infection)      Past Surgical History:   Procedure Laterality Date    ABDOMINAL SURGERY      CYSTOSCOPY W/ RETROGRADES Bilateral 1/4/2019    Procedure: CYSTOSCOPY, WITH RETROGRADE PYELOGRAM; hydrodistention;  Surgeon: Kirsty Acevedo MD;  Location: [facility] OR;  Service: Urology;  Laterality: Bilateral;  RN PREOP 12/31/2018    EXAMINATION UNDER ANESTHESIA N/A 12/17/2018    Procedure: Exam under anesthesia;  Surgeon: Edilson Santana MD;  Location: [facility] OR;  Service: General;  Laterality: N/A;    [place] EXPLORATORY OF ABDOMEN      PROCTOSIGMOIDOSCOPY N/A 12/17/2018    Procedure: PROCTOSIGMOIDOSCOPY;  Surgeon: Edilson Santana MD;  Location: [facility] OR;  Service: General;  Laterality: N/A;     Family History   Problem Relation Age of Onset    Cancer Mother     Diabetes Father      Social History     Tobacco Use    Smoking status: Current Every Day Smoker     Packs/day: 1.00    Smokeless tobacco: Never Used   Substance Use Topics    Alcohol use: Yes     Alcohol/week: 0.0 standard drinks     Comment:  occassionally    Drug use: Yes     Types: Marijuana     Review of Systems   Constitutional: Negative for chills and fever.   HENT: Positive for congestion and sore throat. Negative for trouble swallowing.    Respiratory: Positive for cough. Negative for shortness of breath.    Cardiovascular: Negative for chest pain and palpitations.   Gastrointestinal: Negative for abdominal pain, diarrhea, nausea and vomiting.   Musculoskeletal: Negative for back pain and neck pain.   Neurological: Negative for weakness, numbness and headaches.   All other systems reviewed and are negative.      Physical Exam     Initial Vitals [11/12/19 1029]   BP Pulse Resp Temp SpO2   104/82 71 17 98.2 °F (36.8 °C) 99 %      MAP       --         Physical Exam    Nursing note and vitals reviewed.  Constitutional: Vital signs are normal. She appears well-developed and well-nourished.  Non-toxic appearance. No distress.   HENT:   Head: Normocephalic and atraumatic.   Pharyngeal erythema with no tonsillar swelling or exudate   Eyes: Conjunctivae and EOM are normal. Pupils are equal, round, and reactive to light.   Neck: Normal range of motion. Neck supple. No muscular tenderness present. No neck rigidity.   Cardiovascular: Normal rate, regular rhythm and intact distal pulses.   Pulmonary/Chest: Breath sounds normal. She has no wheezes.   Abdominal: Soft. Normal appearance and bowel sounds are normal. There is no tenderness.   Musculoskeletal: Normal range of motion.   Lymphadenopathy:     She has no cervical adenopathy.     She has no axillary adenopathy.   Neurological: She is alert and oriented to person, place, and time. She has normal strength. No cranial nerve deficit or sensory deficit. Gait normal.   Skin: Skin is warm, dry and intact.   Psychiatric: She has a normal mood and affect. Her behavior is normal.         ED Course   Procedures  Labs Reviewed   POCT URINE PREGNANCY   POCT INFLUENZA A/B MOLECULAR   POCT RAPID STREP A           Imaging Results    None          Medical Decision Making:   Initial Assessment:   This is a 29-year-old female with no significant past medical history comes in complaining of sore throat, cough and congestion.  Patient on examination has normal vital signs. On physical exam lungs are clear.  She has a regular rate and rhythm.  She does have pharyngeal erythema.  Exam is unremarkable otherwise.  Orders included UPT, rapid strep, rapid flu.  She was given Tylenol.  Differential Diagnosis:   Pharyngitis, tonsillitis, strep, influenza, viral syndrome, bronchitis, URI.  Independently Interpreted Test(s):   I have ordered and independently interpreted X-rays - see summary below.       <> Summary of X-Ray Reading(s): Chest x-ray was independently reviewed by me and showed no infiltrate or effusion.  Clinical Tests:   Lab Tests: Ordered and Reviewed       <> Summary of Lab: Rapid strep, rapid flu and UPT were reviewed were negative.  ED Management:  Patient's workup was unremarkable. Her imaging was negative as was her flu and strep.  She does have laryngitis and was given Decadron 8 mg IM for symptomatic relief.  She was discharged with force fluids, Tylenol, Motrin as needed.  She is to return to the ER for any concerns.                                 Clinical Impression:   1. Acute laryngitis  2. URI      Disposition:   Disposition: Discharged  Condition: Stable                     Kala Felipe MD  11/12/19 3451

## 2019-11-15 LAB — BACTERIA THROAT CULT: NORMAL

## 2019-12-28 ENCOUNTER — HOSPITAL ENCOUNTER (EMERGENCY)
Facility: HOSPITAL | Age: 29
Discharge: HOME | End: 2019-12-28
Attending: EMERGENCY MEDICINE

## 2019-12-28 VITALS
WEIGHT: 177 LBS | HEIGHT: 65 IN | HEART RATE: 63 BPM | DIASTOLIC BLOOD PRESSURE: 83 MMHG | RESPIRATION RATE: 18 BRPM | TEMPERATURE: 98 F | SYSTOLIC BLOOD PRESSURE: 125 MMHG | BODY MASS INDEX: 29.49 KG/M2 | OXYGEN SATURATION: 100 %

## 2019-12-28 DIAGNOSIS — R52 PAIN: ICD-10-CM

## 2019-12-28 DIAGNOSIS — L08.9 SKIN INFECTION: Primary | ICD-10-CM

## 2019-12-28 LAB
B-HCG UR QL: NEGATIVE
CTP QC/QA: YES

## 2019-12-28 PROCEDURE — 81025 URINE PREGNANCY TEST: CPT | Mod: ER | Performed by: EMERGENCY MEDICINE

## 2019-12-28 PROCEDURE — 99283 EMERGENCY DEPT VISIT LOW MDM: CPT | Mod: 25,ER

## 2019-12-28 RX ORDER — CEPHALEXIN 500 MG/1
500 CAPSULE ORAL EVERY 8 HOURS
Qty: 20 CAPSULE | Refills: 0 | Status: SHIPPED | OUTPATIENT
Start: 2019-12-28 | End: 2020-01-04

## 2019-12-28 NOTE — ED PROVIDER NOTES
Encounter Date: 12/28/2019       History     Chief Complaint   Patient presents with    Foot Pain     pt c/o pain and bump to the medial aspect of the left foot by the great toe. pt states started 3 days ago.      A 29-year-old nontoxic female who presents to the ED with bump to medial left foot.  Patient states it began to hurt earlier today while at work.  Patient denies injury.    The history is provided by the patient.   Foot Pain   This is a new problem. The current episode started more than 2 days ago. The problem has been rapidly improving. Pertinent negatives include no chest pain and no shortness of breath.     Review of patient's allergies indicates:   Allergen Reactions    Naproxen Hives     Past Medical History:   Diagnosis Date    Interstitial cystitis     Kidney stone     Migraine     UTI (urinary tract infection)      Past Surgical History:   Procedure Laterality Date    ABDOMINAL SURGERY      CYSTOSCOPY W/ RETROGRADES Bilateral 1/4/2019    Procedure: CYSTOSCOPY, WITH RETROGRADE PYELOGRAM; hydrodistention;  Surgeon: Kirsty Acevedo MD;  Location: [facility] OR;  Service: Urology;  Laterality: Bilateral;  RN PREOP 12/31/2018    EXAMINATION UNDER ANESTHESIA N/A 12/17/2018    Procedure: Exam under anesthesia;  Surgeon: Edilson Santana MD;  Location: [facility] OR;  Service: General;  Laterality: N/A;    AR EXPLORATORY OF ABDOMEN      PROCTOSIGMOIDOSCOPY N/A 12/17/2018    Procedure: PROCTOSIGMOIDOSCOPY;  Surgeon: Edilson Santana MD;  Location: [facility] OR;  Service: General;  Laterality: N/A;     Family History   Problem Relation Age of Onset    Cancer Mother     Diabetes Father      Social History     Tobacco Use    Smoking status: Current Every Day Smoker     Packs/day: 1.00    Smokeless tobacco: Never Used   Substance Use Topics    Alcohol use: Yes     Alcohol/week: 0.0 standard drinks     Comment: occassionally    Drug use: Yes     Types: Marijuana     Review of Systems   Constitutional:  Negative.  Negative for fever.   HENT: Negative.    Eyes: Negative.    Respiratory: Negative.  Negative for shortness of breath.    Cardiovascular: Negative.  Negative for chest pain.   Gastrointestinal: Negative.    Endocrine: Negative.    Genitourinary: Negative.    Musculoskeletal:        Left foot pain   Skin: Negative.    Allergic/Immunologic: Negative.    Neurological: Negative.    Hematological: Negative.    All other systems reviewed and are negative.      Physical Exam     Initial Vitals [12/28/19 1308]   BP Pulse Resp Temp SpO2   125/83 73 18 97.8 °F (36.6 °C) 100 %      MAP       --         Physical Exam    Nursing note and vitals reviewed.  Constitutional: Vital signs are normal. She appears well-developed. She is cooperative. She does not appear ill.   HENT:   Right Ear: External ear normal.   Left Ear: External ear and ear canal normal.   Mouth/Throat: Oropharynx is clear and moist and mucous membranes are normal.   Eyes: Conjunctivae and lids are normal.   Neck: Normal range of motion. Neck supple.   Cardiovascular: Normal rate, regular rhythm, S1 normal, S2 normal and normal heart sounds. Exam reveals no gallop.    No murmur heard.  Pulmonary/Chest: Effort normal and breath sounds normal. No respiratory distress.   Abdominal: Soft. Normal appearance and bowel sounds are normal. There is no tenderness.   Musculoskeletal: Normal range of motion.        Feet:    0.5 cm erythematous area, tender to touch, small bump in the middle    Neurological: She is alert and oriented to person, place, and time.   Skin: Skin is warm, dry and intact.   Psychiatric: She has a normal mood and affect. Her speech is normal. Thought content normal.         ED Course   Procedures  Labs Reviewed   POCT URINE PREGNANCY          Imaging Results    None       Imaging Results          X-Ray Foot Complete Left (Final result)  Result time 12/28/19 15:20:09    Final result by Mickey Araujo MD (12/28/19 15:20:09)                  Impression:      No acute process.      Electronically signed by: Mickey Araujo MD  Date:    12/28/2019  Time:    15:20             Narrative:    EXAMINATION:  XR FOOT COMPLETE 3 VIEW LEFT    CLINICAL HISTORY:  Pain, unspecified    TECHNIQUE:  AP, lateral and oblique views of the left foot were performed.    COMPARISON:  06/16/2018.    FINDINGS:  The bone mineralization is within normal limits.  No cortical step-off is identified.  There is no evidence of periostitis.    The joint spaces are maintained.  The Lisfranc articulation is maintained.  The soft tissues are unremarkable.  No radiopaque foreign body is identified.    There is no evidence of a fracture or dislocation of the left foot.                                  Medical Decision Making:   Initial Assessment:   A 29-year-old nontoxic female who presents to the ED with bump to medial left foot.  Patient states it began to hurt earlier today while at work.  Patient denies injury.    Differential Diagnosis:   Foot pain, foot infection   Clinical Tests:   Radiological Study: Ordered and Reviewed  ED Management:  Placed in a postop shoe.  Discharge home with Keflex and Motrin.  Follow-up with PCP in 2 days.  Return ED for worsening of symptoms                                 Clinical Impression:       ICD-10-CM ICD-9-CM   1. Skin infection L08.9 686.9   2. Pain R52 780.96                             AMADOU Carson  12/28/19 1804

## 2020-03-24 ENCOUNTER — TELEPHONE (OUTPATIENT)
Dept: OBSTETRICS AND GYNECOLOGY | Facility: CLINIC | Age: 30
End: 2020-03-24

## 2020-03-24 NOTE — TELEPHONE ENCOUNTER
----- Message from Aman Johnson MD sent at 3/23/2020  4:52 PM CDT -----  Contact: Self  Only ibuprofen for pain.  No med for vaginal odor without discharge    Aman Johnson MD    ----- Message -----  From: Anjelica Gonzalez MA  Sent: 3/23/2020   4:38 PM CDT  To: Aman Johnson MD    Pt. Wants to know if you can call her something in for her pain Pelvic pain, back pain and vaginal odor  ----- Message -----  From: Elisa Ochoa  Sent: 3/23/2020   4:02 PM CDT  To: Alex [facility]URRAY Staff    Type: Patient Call Back    Who called: Self  What is the request in detail: Pelvic pain, back pain and vaginal odor    Can the clinic reply by [facility]? No     Would the patient rather a call back or a response via [facility]? Call     Best call back number: [phone]

## 2020-04-15 ENCOUNTER — TELEPHONE (OUTPATIENT)
Dept: UROLOGY | Facility: CLINIC | Age: 30
End: 2020-04-15

## 2020-04-15 NOTE — TELEPHONE ENCOUNTER
----- Message from Christel Shah sent at 4/15/2020 10:49 AM CDT -----  Contact: LOGAN LEIVA [[id number]]  Name of Who is Calling: LOGAN LEIVA [[id number]]      What is the request in detail: Pt is calling to speak to staff in regards to scheduling a virtual visit pt states she is exp pelvis pain . Please call to further assist .       Can the clinic reply by [facility]: N      What Number to Call Back if not in [facility]: [phone]

## 2020-04-16 ENCOUNTER — OFFICE VISIT (OUTPATIENT)
Dept: UROLOGY | Facility: CLINIC | Age: 30
End: 2020-04-16
Payer: MEDICAID

## 2020-04-16 VITALS — BODY MASS INDEX: 31.09 KG/M2 | HEIGHT: 64 IN | WEIGHT: 182.13 LBS

## 2020-04-16 DIAGNOSIS — R39.89 SUSPECTED UTI: Primary | ICD-10-CM

## 2020-04-16 DIAGNOSIS — R10.2 PELVIC PAIN: ICD-10-CM

## 2020-04-16 DIAGNOSIS — N30.10 INTERSTITIAL CYSTITIS: ICD-10-CM

## 2020-04-16 PROCEDURE — 87086 URINE CULTURE/COLONY COUNT: CPT

## 2020-04-16 PROCEDURE — 99214 OFFICE O/P EST MOD 30 MIN: CPT | Mod: S$PBB,,, | Performed by: NURSE PRACTITIONER

## 2020-04-16 PROCEDURE — 99214 PR OFFICE/OUTPT VISIT, EST, LEVL IV, 30-39 MIN: ICD-10-PCS | Mod: S$PBB,,, | Performed by: NURSE PRACTITIONER

## 2020-04-16 PROCEDURE — 81001 URINALYSIS AUTO W/SCOPE: CPT | Mod: PBBFAC | Performed by: NURSE PRACTITIONER

## 2020-04-16 PROCEDURE — 99999 PR PBB SHADOW E&M-EST. PATIENT-LVL III: CPT | Mod: PBBFAC,,, | Performed by: NURSE PRACTITIONER

## 2020-04-16 PROCEDURE — 99999 PR PBB SHADOW E&M-EST. PATIENT-LVL III: ICD-10-PCS | Mod: PBBFAC,,, | Performed by: NURSE PRACTITIONER

## 2020-04-16 PROCEDURE — 99213 OFFICE O/P EST LOW 20 MIN: CPT | Mod: PBBFAC | Performed by: NURSE PRACTITIONER

## 2020-04-16 RX ORDER — CEPHALEXIN 500 MG/1
500 CAPSULE ORAL 2 TIMES DAILY
Qty: 14 CAPSULE | Refills: 0 | Status: CANCELLED | OUTPATIENT
Start: 2020-04-16 | End: 2020-04-23

## 2020-04-16 RX ORDER — CEPHALEXIN 500 MG/1
500 CAPSULE ORAL 2 TIMES DAILY
Qty: 14 CAPSULE | Refills: 0 | Status: SHIPPED | OUTPATIENT
Start: 2020-04-16 | End: 2020-04-23

## 2020-04-16 NOTE — PROGRESS NOTES
"Subjective:       Patient ID: Crystal Tsai is a 29 y.o. female who was last seen in this office 2/7/19    Chief Complaint:   Chief Complaint   Patient presents with    Urinary Tract Infection     pt coming in for uti     Cystitis     Per Dr. Acevedo:  She was seen as inpatient consultation 12/16/18 by Dr Jc. She has diagnosis of IC (since age 17) and was seen by other urologists years ago. Records not available. She was reportedly scheduled to see another urologist in 1/19.     She was admitted for pain, possible perirectal abscess. Also noted to have ANN-MARIE issues requiring CIC occasionally. She reported that was a chronic issue. Flomax given in house. Possible gross hematuria while admitted.     Previously on Elmiron and other meds (unsure what) that were cost-prohibitive. On Neurontin. She continues to smoke.     She continues to mostly c/o pain. Some relief from heating pads. She is avoiding most bladder irritants. Pain worst after intercourse. She is limited fluids now due to concern for voiding issues.      No UCx done during recent hospitalization. UA concerning for UTI.     CT uro 12/18 - no delayed imaging done - distal ureters unable to be assessed. Radiologist concerned for possible ureteral stricture in R (unable to assess this with available imaging). Bladder/kidneys otherwise normal.     PVR (bladder scan) initial visit- 27cc    She was treated with Bactrim for UTI in 12/2018 by Dr. Acevedo. She is now s/p cystoscopy with bilateral RPG on 1/4/19 as part of hematuria workup. Cysto/RPG--normal. She also underwent hydrodistention at that time.     4/16/2020  Patient last seen >1 year ago. She presents today with c/o "bladder pain" and malodorous urine for the past week. Denies urinary frequency, hematuria, flank pain or fever. She has been non compliant with IC diet. Reports relief of pelvic pain with Uribel prn but is out of this medication. UA today concerning for a UTI    ACTIVE MEDICAL " "ISSUES:  Patient Active Problem List   Diagnosis    Interstitial cystitis    Cellulitis of left thumb    Left ovarian cyst    Pelvic pain    Acute cystitis    Acute renal failure    Hypokalemia    Tobacco abuse    Incomplete bladder emptying    Gross hematuria    Sciatica of left side       ALLERGIES AND MEDICATIONS: updated and reviewed.  Review of patient's allergies indicates:   Allergen Reactions    Naproxen Hives     Current Outpatient Medications   Medication Sig    NEXPLANON 68 mg Impl subdermal device     cephALEXin (KEFLEX) 500 MG capsule Take 1 capsule (500 mg total) by mouth 2 (two) times daily. for 7 days    methen-m.blue-s.phos-phsal-hyo (URIBEL) 118-10-40.8-36 mg Cap Take 1 capsule by mouth 4 (four) times daily as needed.     No current facility-administered medications for this visit.        Review of Systems   Constitutional: Negative for activity change, chills, fatigue, fever and unexpected weight change.   Eyes: Negative for discharge, redness and visual disturbance.   Respiratory: Negative for cough, shortness of breath and wheezing.    Cardiovascular: Negative for chest pain and leg swelling.   Gastrointestinal: Negative for abdominal distention, abdominal pain, constipation, diarrhea, nausea and vomiting.   Genitourinary: Positive for pelvic pain. Negative for decreased urine volume, difficulty urinating, dysuria, flank pain, frequency, hematuria and urgency.   Musculoskeletal: Negative for arthralgias, joint swelling and myalgias.   Skin: Negative for color change and rash.   Neurological: Negative for dizziness and light-headedness.   Psychiatric/Behavioral: Negative for behavioral problems and confusion. The patient is not nervous/anxious.        Objective:      Vitals:    04/16/20 1118   Weight: 82.6 kg (182 lb 1.6 oz)   Height: 5' 4" (1.626 m)     Physical Exam   Constitutional: She is oriented to person, place, and time. She appears well-developed.   HENT:   Head: " Normocephalic and atraumatic.   Nose: Nose normal.   Eyes: Conjunctivae are normal. Right eye exhibits no discharge. Left eye exhibits no discharge.   Neck: Normal range of motion. Neck supple. No tracheal deviation present. No thyromegaly present.   Cardiovascular: Normal rate and regular rhythm.    Pulmonary/Chest: Effort normal. No respiratory distress. She has no wheezes.   Abdominal: Soft. She exhibits no distension. There is no hepatosplenomegaly. There is no tenderness. There is no CVA tenderness. No hernia.   Genitourinary:   Genitourinary Comments: Patient declined exam   Musculoskeletal: Normal range of motion. She exhibits no edema.   Neurological: She is alert and oriented to person, place, and time.   Skin: Skin is warm and dry. No rash noted. No erythema.     Psychiatric: She has a normal mood and affect. Her behavior is normal. Judgment normal.       Urine dipstick shows ++LE, +Nitrite.     Assessment:       1. Suspected UTI    2. Pelvic pain    3. Interstitial cystitis          Plan:       1. Suspected UTI  -UA today concerning for UTI  -Empiric Keflex  - POCT urinalysis, dipstick or tablet reag  - Urine culture  - cephALEXin (KEFLEX) 500 MG capsule; Take 1 capsule (500 mg total) by mouth 2 (two) times daily. for 7 days  Dispense: 14 capsule; Refill: 0    2. Pelvic pain  -Uribel prn  -Rx and voucher given to patient  -Adequate hydration  -Avoid/limit bladder irritants  - methen-m.blue-s.phos-phsal-hyo (URIBEL) 118-10-40.8-36 mg Cap; Take 1 capsule by mouth 4 (four) times daily as needed.  Dispense: 40 capsule; Refill: 3    3. Interstitial cystitis  - Long history   -s/p hydrodistention on 1/4/19  -Uribel prn  -IC diet  - POCT urinalysis, dipstick or tablet reag  - methen-m.blue-s.phos-phsal-hyo (URIBEL) 118-10-40.8-36 mg Cap; Take 1 capsule by mouth 4 (four) times daily as needed.  Dispense: 40 capsule; Refill: 3            Follow up in about 6 months (around 10/16/2020) for Follow up.

## 2020-04-17 LAB
BILIRUB SERPL-MCNC: NORMAL MG/DL
BLOOD URINE, POC: NORMAL
COLOR, POC UA: YELLOW
GLUCOSE UR QL STRIP: NORMAL
KETONES UR QL STRIP: NORMAL
LEUKOCYTE ESTERASE URINE, POC: POSITIVE
NITRITE, POC UA: POSITIVE
PH, POC UA: 8
PROTEIN, POC: NORMAL
SPECIFIC GRAVITY, POC UA: 1000
UROBILINOGEN, POC UA: NORMAL

## 2020-04-18 LAB — BACTERIA UR CULT: NORMAL

## 2020-04-20 ENCOUNTER — TELEPHONE (OUTPATIENT)
Dept: UROLOGY | Facility: CLINIC | Age: 30
End: 2020-04-20

## 2020-04-20 NOTE — TELEPHONE ENCOUNTER
Please inform patient that recent urine culture was negative for infection. She should stop taking Keflex given at previous visit, no need for antibiotics at this time

## 2020-06-12 ENCOUNTER — TELEPHONE (OUTPATIENT)
Dept: OBSTETRICS AND GYNECOLOGY | Facility: CLINIC | Age: 30
End: 2020-06-12

## 2020-06-12 NOTE — TELEPHONE ENCOUNTER
Spoke with pt declinded first available will go to ER       ----- Message from Monie Wheeler sent at 6/12/2020  8:09 AM CDT -----  Contact: Patient [phone]  Name of Caller Patient  Reason for Visit/Symptoms Really bad vaginal burning  Best Contact Number or Confirm if Mychart Preferred [phone]  Preferred Date/Time of Appointment This morning, 06-12-20  Interested in Virtual Visit (yes/no) No

## 2020-06-15 ENCOUNTER — TELEPHONE (OUTPATIENT)
Dept: OBSTETRICS AND GYNECOLOGY | Facility: CLINIC | Age: 30
End: 2020-06-15

## 2020-06-15 NOTE — TELEPHONE ENCOUNTER
Pt will be scheduled to see Alex 6/17/2020. Possible uti      ----- Message from Leda Valenzuela sent at 6/15/2020 11:22 AM CDT -----  Type: Patient Call Back    Who called: Self    What is the request in detail: calling in regards to bladder infection. Would like to speak with nurse staff regarding been seen asap.    Can the clinic reply by [facility]? Call back     Would the patient rather a call back or a response via [facility]? Call back    Best call back number:  [phone]

## 2020-06-24 ENCOUNTER — OFFICE VISIT (OUTPATIENT)
Dept: OBSTETRICS AND GYNECOLOGY | Facility: CLINIC | Age: 30
End: 2020-06-24
Payer: MEDICAID

## 2020-06-24 ENCOUNTER — TELEPHONE (OUTPATIENT)
Dept: OBSTETRICS AND GYNECOLOGY | Facility: CLINIC | Age: 30
End: 2020-06-24

## 2020-06-24 VITALS
BODY MASS INDEX: 30.39 KG/M2 | SYSTOLIC BLOOD PRESSURE: 115 MMHG | DIASTOLIC BLOOD PRESSURE: 71 MMHG | HEIGHT: 64 IN | WEIGHT: 178 LBS

## 2020-06-24 DIAGNOSIS — Z97.5 NEXPLANON IN PLACE: ICD-10-CM

## 2020-06-24 DIAGNOSIS — Z12.4 CERVICAL CANCER SCREENING: ICD-10-CM

## 2020-06-24 DIAGNOSIS — N89.8 VAGINAL DISCHARGE: ICD-10-CM

## 2020-06-24 DIAGNOSIS — Z01.419 WELL WOMAN EXAM WITH ROUTINE GYNECOLOGICAL EXAM: Primary | ICD-10-CM

## 2020-06-24 PROCEDURE — 99999 PR PBB SHADOW E&M-EST. PATIENT-LVL III: CPT | Mod: PBBFAC,,, | Performed by: OBSTETRICS & GYNECOLOGY

## 2020-06-24 PROCEDURE — 99395 PREV VISIT EST AGE 18-39: CPT | Mod: S$PBB,,, | Performed by: OBSTETRICS & GYNECOLOGY

## 2020-06-24 PROCEDURE — 88175 CYTOPATH C/V AUTO FLUID REDO: CPT

## 2020-06-24 PROCEDURE — 99395 PR PREVENTIVE VISIT,EST,18-39: ICD-10-PCS | Mod: S$PBB,,, | Performed by: OBSTETRICS & GYNECOLOGY

## 2020-06-24 PROCEDURE — 87624 HPV HI-RISK TYP POOLED RSLT: CPT

## 2020-06-24 PROCEDURE — 99999 PR PBB SHADOW E&M-EST. PATIENT-LVL III: ICD-10-PCS | Mod: PBBFAC,,, | Performed by: OBSTETRICS & GYNECOLOGY

## 2020-06-24 PROCEDURE — 99213 OFFICE O/P EST LOW 20 MIN: CPT | Mod: PBBFAC | Performed by: OBSTETRICS & GYNECOLOGY

## 2020-06-24 RX ORDER — METRONIDAZOLE 500 MG/1
500 TABLET ORAL EVERY 12 HOURS
Qty: 28 TABLET | Refills: 0 | Status: SHIPPED | OUTPATIENT
Start: 2020-06-24 | End: 2020-07-01

## 2020-06-24 RX ORDER — FLUCONAZOLE 150 MG/1
150 TABLET ORAL
Qty: 6 TABLET | Refills: 0 | Status: SHIPPED | OUTPATIENT
Start: 2020-06-24 | End: 2021-03-09

## 2020-06-24 NOTE — TELEPHONE ENCOUNTER
Spoke with pt will call [facility] to have rx transferred     ----- Message from Lilli Moralez sent at 6/24/2020  3:54 PM CDT -----  Contact: Self [phone]  Type: Patient Call Back    Who called: Self    What is the request in detail: pt is calling because the [facility] that her medications was sent to today is closed due to COVID and she is wondering if it can be sent to a different [facility] in [place] [address] [place] [address]    Can the clinic reply by [facility]? Call back    Would the patient rather a call back or a response via [facility]? Call back    Best call back number: [phone]

## 2020-06-29 PROBLEM — N83.202 LEFT OVARIAN CYST: Status: RESOLVED | Noted: 2017-11-24 | Resolved: 2020-06-29

## 2020-06-29 PROBLEM — R10.2 PELVIC PAIN: Status: RESOLVED | Noted: 2018-05-24 | Resolved: 2020-06-29

## 2020-06-29 PROBLEM — N17.9 ACUTE RENAL FAILURE: Status: RESOLVED | Noted: 2018-09-11 | Resolved: 2020-06-29

## 2020-06-29 PROBLEM — N30.00 ACUTE CYSTITIS: Status: RESOLVED | Noted: 2018-09-10 | Resolved: 2020-06-29

## 2020-06-29 NOTE — PROGRESS NOTES
"[facility]  Ambulatory Clinic  Obstetrics & Gynecology    Visit Date:  2020    Chief Complaint:  Annual GYN exam, vaginal discharge    History of Present Illness:      Crystal Tsai is a 29 y.o. , new pt to me, here for a gynecologic exam with c/o vaginal discharge.    Pt described discharge as itchy, white, non bloody, without pelvic pain or abnormal vaginal bleeding for past few days.    Menses are light, not heavy or painful on Nexplanon.    Pt denies family h/o adverse reaction to hormonal contraception.    Pt reportsremote h/o abnormal pap    Pt denies active sexually transmitted infections.    Pt performs monthly self breast examination, smokes tobacco, uses seat belts, and denies abuse.     Pt denies abnormal vaginal bleeding, dysmenorrhea, dyspareunia, pelvic pain, bloating, early satiety, unintentional weight loss, breast mass/skin changes, incontinence, GI or urinary complaints.      Otherwise, the pt is in her usual state of health.    Past History:  Gynecologic history as noted above.    Review of Systems:      GENERAL:  No fever, fatigue, excessive weight gain or loss  HEENT:  No headaches, hearing changes, visual disturbance  RESPIRATORY:  No cough, shortness of breath  CARDIOVASCULAR:  No chest pain, heart palpitations, leg swelling  BREAST:  No lump, pain, nipple discharge, skin changes  GASTROINTESTINAL:  No nausea, vomiting, constipation, diarrhea, abd pain, rectal bleeding   GENITOURINARY:  See HPI  ENDOCRINE:  No heat or cold intolerance  HEMATOLOGIC:  No easy bruisability or bleeding   LYMPHATICS:  No enlarged nodes  MUSCULOSKELETAL:  No acute joint pain or swelling  SKIN:  No rash, lesions, jaundice  NEUROLOGIC:  No dizziness, weakness, syncope  PSYCHIATRIC:  No significant mood changes, homicidal/suicidal ideations    Physical Exam:     /71   Ht 5' 4" (1.626 m)   Wt 80.7 kg (178 lb)   BMI 30.55 kg/m²   Pulse 70, Resp rate 16     GENERAL:  " No acute distress, well-nourished  HEENT:  Atraumatic, anicteric, moist mucus membranes. Neck supple w/o masses.  BREAST:  Symmetric, nontender, no obvious masses, adenopathy, skin changes or nipple discharge.  LUNGS:  Clear to auscultation  HEART:  Regular rate and rhythm, no murmurs, gallops, or rubs  ABDOMEN:  Soft, non-tender, non-distended, normoactive bowel sounds, no obvious organomegaly  EXT:  Symmetric w/o cramping, claudication, or edema. +2 distal pulses.  SKIN:  No rashes or bruising  PSYCH:  Mood and affect appropriate  NEURO:  Grossly intact bilaterally     GENITOURINARY:    VULVAR:  Female external genitalia w/o obvious lesions. Female hair distribution. Normal urethral meatus. No gross lymphadenopathy.    VAGINA:  Pink, moist, well-rugated. Good support. No obvious lesion. Mild white discharge, +yeast.  CERVIX:  No cervical motion tenderness, discharge, or obvious lesions.   UTERUS:  Small, non-tender, normal contour  ADNEXA:  No masses, non-tender    RECTAL:  Declined. No obvious external lesions    Chaperone present for exam.    Assessment:     29 y.o.  with Nexplanon placed 2019:    1. Well woman gynecologic exam  2. Vaginal yeast infection  3. Nexplanon     Plan:    A gynecologic health assessment was performed with age appropriate counseling.    Cervical cancer screening - pap obtained.    STI screening - pt declined.      Diflucan for yeast infection.  If unresolved, use monistat 7.  Pt also requesting an Rx for flagyl for future use for bacterial vaginosis.  No alcohol while on flagyl.  Hygiene advice.    Continue Nexplanon.  Risks, benefits, and alternatives to Nexplanon reviewed.  Pt advised Nexplanon will need to be removed in 3 years from date of insertion.      Encourage healthy lifestyle modifications, monthly self breast exams, encourage HPV vaccination, Ca/Vit D.    F/u with PCP for health maintenance.    Encourage tobacco cessation    Return 1 year for gynecologic exam, or  sooner as needed.  All questions answered, pt voiced understanding.        Fidel Munguia MD

## 2020-06-30 LAB
FINAL PATHOLOGIC DIAGNOSIS: NORMAL
Lab: NORMAL

## 2020-07-03 ENCOUNTER — HOSPITAL ENCOUNTER (EMERGENCY)
Facility: HOSPITAL | Age: 30
Discharge: HOME | End: 2020-07-03
Attending: EMERGENCY MEDICINE
Payer: MEDICAID

## 2020-07-03 VITALS
HEART RATE: 67 BPM | RESPIRATION RATE: 18 BRPM | DIASTOLIC BLOOD PRESSURE: 88 MMHG | TEMPERATURE: 98 F | SYSTOLIC BLOOD PRESSURE: 124 MMHG | WEIGHT: 180 LBS | OXYGEN SATURATION: 100 % | HEIGHT: 64 IN | BODY MASS INDEX: 30.73 KG/M2

## 2020-07-03 DIAGNOSIS — R11.2 NON-INTRACTABLE VOMITING WITH NAUSEA, UNSPECIFIED VOMITING TYPE: Primary | ICD-10-CM

## 2020-07-03 LAB
B-HCG UR QL: NEGATIVE
BILIRUB UR QL STRIP: NEGATIVE
CLARITY UR: CLEAR
COLOR UR: YELLOW
CTP QC/QA: YES
GLUCOSE UR QL STRIP: NEGATIVE
HGB UR QL STRIP: NEGATIVE
KETONES UR QL STRIP: NEGATIVE
LEUKOCYTE ESTERASE UR QL STRIP: NEGATIVE
NITRITE UR QL STRIP: NEGATIVE
PH UR STRIP: 7 [PH] (ref 5–8)
PROT UR QL STRIP: NEGATIVE
SARS-COV-2 RDRP RESP QL NAA+PROBE: NEGATIVE
SP GR UR STRIP: 1.01 (ref 1–1.03)
URN SPEC COLLECT METH UR: NORMAL
UROBILINOGEN UR STRIP-ACNC: NEGATIVE EU/DL

## 2020-07-03 PROCEDURE — 81003 URINALYSIS AUTO W/O SCOPE: CPT

## 2020-07-03 PROCEDURE — 81025 URINE PREGNANCY TEST: CPT | Performed by: PHYSICIAN ASSISTANT

## 2020-07-03 PROCEDURE — 25000003 PHARM REV CODE 250: Performed by: PHYSICIAN ASSISTANT

## 2020-07-03 PROCEDURE — U0002 COVID-19 LAB TEST NON-CDC: HCPCS

## 2020-07-03 PROCEDURE — 99284 EMERGENCY DEPT VISIT MOD MDM: CPT

## 2020-07-03 RX ORDER — ONDANSETRON 8 MG/1
8 TABLET, ORALLY DISINTEGRATING ORAL
Status: COMPLETED | OUTPATIENT
Start: 2020-07-03 | End: 2020-07-03

## 2020-07-03 RX ORDER — ONDANSETRON 4 MG/1
4 TABLET, ORALLY DISINTEGRATING ORAL EVERY 6 HOURS PRN
Qty: 20 TABLET | Refills: 0 | Status: SHIPPED | OUTPATIENT
Start: 2020-07-03 | End: 2021-03-09

## 2020-07-03 RX ORDER — PANTOPRAZOLE SODIUM 20 MG/1
20 TABLET, DELAYED RELEASE ORAL DAILY
Qty: 14 TABLET | Refills: 0 | Status: SHIPPED | OUTPATIENT
Start: 2020-07-03 | End: 2021-03-09

## 2020-07-03 RX ORDER — PANTOPRAZOLE SODIUM 40 MG/1
40 TABLET, DELAYED RELEASE ORAL
Status: COMPLETED | OUTPATIENT
Start: 2020-07-03 | End: 2020-07-03

## 2020-07-03 RX ADMIN — PANTOPRAZOLE SODIUM 40 MG: 40 TABLET, DELAYED RELEASE ORAL at 08:07

## 2020-07-03 RX ADMIN — ONDANSETRON 8 MG: 8 TABLET, ORALLY DISINTEGRATING ORAL at 08:07

## 2020-07-03 NOTE — Clinical Note
Crystal Tsai was seen and treated in our emergency department on 7/3/2020.  She may return to work on 07/05/2020.       If you have any questions or concerns, please don't hesitate to call.      Remigio Perez PA-C

## 2020-07-04 NOTE — ED PROVIDER NOTES
Encounter Date: 7/3/2020       History     Chief Complaint   Patient presents with    Vomiting     Symptoms started at 11am today accompanied with chills. Reports multiple vomiting episodes today. Took Pepto without relief. Reports she can't keep anything down. Denies cough/fevers.     Dizziness     29-year-old female with history of interstitial cystitis presents to ED complaining of acute onset nausea with innumerable episodes of nonbloody, nonbilious emesis this morning.  Initially, no abdominal pain; now with mild upper abdominal pain.  Normal BM this morning.  No diarrhea.  No recent illness or sick contacts.  She denies headache or head trauma.  No visual disturbance.  She does admit to feeling lightheaded.  No vertigo.  No tinnitus.  No otalgia.  No urinary complaints or flank pain.  She is currently being treated for a vaginal infection with Flagyl and an antifungal; she denies nausea vomiting related to these medications.  No fever, she does chills.  No URI complaints.  No cough, shortness of breath dyspnea on exertion, chest pain.  No neck pain or stiffness.  No IV drug use.        Review of patient's allergies indicates:   Allergen Reactions    Naproxen Hives     Past Medical History:   Diagnosis Date    Interstitial cystitis     Kidney stone     Migraine     UTI (urinary tract infection)      Past Surgical History:   Procedure Laterality Date    ABDOMINAL SURGERY      CYSTOSCOPY W/ RETROGRADES Bilateral 1/4/2019    Procedure: CYSTOSCOPY, WITH RETROGRADE PYELOGRAM; hydrodistention;  Surgeon: Kirsty Acevedo MD;  Location: [facility] OR;  Service: Urology;  Laterality: Bilateral;  RN PREOP 12/31/2018    EXAMINATION UNDER ANESTHESIA N/A 12/17/2018    Procedure: Exam under anesthesia;  Surgeon: Edilson Santana MD;  Location: [facility] OR;  Service: General;  Laterality: N/A;    AR EXPLORATORY OF ABDOMEN      PROCTOSIGMOIDOSCOPY N/A 12/17/2018    Procedure: PROCTOSIGMOIDOSCOPY;  Surgeon: Edilson MARTIN  MD Esther;  Location: [facility] OR;  Service: General;  Laterality: N/A;     Family History   Problem Relation Age of Onset    Cancer Mother     Diabetes Father      Social History     Tobacco Use    Smoking status: Current Every Day Smoker     Packs/day: 1.00    Smokeless tobacco: Never Used   Substance Use Topics    Alcohol use: Yes     Alcohol/week: 0.0 standard drinks     Comment: occassionally    Drug use: Yes     Types: Marijuana     Review of Systems   Constitutional: Positive for appetite change and chills. Negative for activity change and fever.   HENT: Negative for congestion, rhinorrhea, sinus pressure and sinus pain.    Eyes: Negative for visual disturbance.   Respiratory: Negative for cough and shortness of breath.    Cardiovascular: Negative for chest pain and leg swelling.   Gastrointestinal: Positive for abdominal pain, nausea and vomiting. Negative for blood in stool, constipation and diarrhea.   Genitourinary: Positive for vaginal discharge. Negative for dysuria, flank pain, frequency and hematuria.   Musculoskeletal: Negative for myalgias, neck pain and neck stiffness.   Skin: Negative for pallor.   Neurological: Positive for light-headedness. Negative for dizziness, syncope, weakness, numbness and headaches.       Physical Exam     Initial Vitals [07/03/20 1932]   BP Pulse Resp Temp SpO2   116/76 83 20 98.2 °F (36.8 °C) 99 %      MAP       --         Physical Exam    Nursing note and vitals reviewed.  Constitutional: She appears well-developed and well-nourished. She is not diaphoretic. No distress.   Overall well-appearing and nontoxic.  Resting comfortably on exam table.   HENT:   Head: Normocephalic and atraumatic.   Mouth/Throat: Oropharynx is clear and moist.   Mucous membranes moist   Eyes: EOM are normal.   Neck: Normal range of motion. Neck supple.   Cardiovascular: Intact distal pulses.   Pulmonary/Chest: No respiratory distress.   Abdominal:   Abdomen overall soft, normal bowel  sounds ×4.  No significant tenderness to palpation of any quadrant.  No rebound or guarding.  No palpable mass or distention.  No flank or CVA tenderness.  Negative Freeman sign.  No pain over McBurney's point.   Musculoskeletal: Normal range of motion.   Lymphadenopathy:     She has no cervical adenopathy.   Neurological: She is alert and oriented to person, place, and time.   Skin: Skin is warm.   Psychiatric: She has a normal mood and affect. Thought content normal.         ED Course   Procedures  Labs Reviewed   SARS-COV-2 RNA AMPLIFICATION, QUAL   URINALYSIS, REFLEX TO URINE CULTURE    Narrative:     Specimen Source->Urine   POCT URINE PREGNANCY          Imaging Results    None          Medical Decision Making:   Differential Diagnosis:   Viral illness, gastroenteritis, GERD, pancreatitis, cholecystitis, SBO  ED Management:  There is no tenderness to the abdomen.  Initially without pain, now with some mild epigastric pain, may be related to episodes of emesis.  No fever.  No recent illness.  Denies sick contacts.  No urinary complaints.  Currently being treated for vaginal infection.  Vitals reassuring.  No significant comorbidities.                   ED Course as of Jul 04 0257 Fri Jul 03, 2020 2102 She is not orthostatic.    [SM]   2145 On re-evaluation, patient feels better.  Review belly exam unremarkable.  I will discharge with antiemetics and PPI.    [SM]      ED Course User Index  [SM] Remigio Perez PA-C                Clinical Impression:       ICD-10-CM ICD-9-CM   1. Non-intractable vomiting with nausea, unspecified vomiting type  R11.2 787.01         Disposition:   Disposition: Discharged  Condition: Stable     ED Disposition Condition    Discharge Stable        ED Prescriptions     Medication Sig Dispense Start Date End Date Auth. Provider    ondansetron (ZOFRAN-ODT) 4 MG TbDL Take 1 tablet (4 mg total) by mouth every 6 (six) hours as needed (nausea). 20 tablet 7/3/2020  Remigio Perez  ROLAND    pantoprazole (PROTONIX) 20 MG tablet Take 1 tablet (20 mg total) by mouth once daily. for 14 days 14 tablet 7/3/2020 7/17/2020 Remigio Perez PA-C        Follow-up Information     Follow up With Specialties Details Why Contact Info    Primary care provider  Schedule an appointment as soon as possible for a visit  For reevaluation     [facility]  As needed, If symptoms worsen [facility] [address]  [facility] [id number] [phone]                                     Remigio Perez PA-C  07/04/20 0257

## 2020-07-04 NOTE — ED TRIAGE NOTES
Patient reports n/v, chills, fever, weakness, cough, sore throat that started this morning. Reports taking Tylenol for the symptoms, last taken at noon today.

## 2020-07-04 NOTE — ED NOTES
Pt given cup of water and instructed to take slow, small sips; instructed to report episodes of n/v. Pt verbalized understanding

## 2020-07-04 NOTE — DISCHARGE INSTRUCTIONS
BRAT diet.  Drink lots of fluids, stay well hydrated.  Zofran as needed for nausea.  Protonix daily.    Please follow-up with primary care provider for re-evaluation.  Return to this emergency department if you begin with fever, if no longer eating or drinking, if symptoms worsen despite treatment, if you begin with severe abdominal pain, if any other problems occur.

## 2020-07-08 LAB
HPV HR 12 DNA SPEC QL NAA+PROBE: POSITIVE
HPV16 AG SPEC QL: NEGATIVE
HPV18 DNA SPEC QL NAA+PROBE: NEGATIVE

## 2020-07-28 ENCOUNTER — HOSPITAL ENCOUNTER (EMERGENCY)
Facility: HOSPITAL | Age: 30
Discharge: HOME | End: 2020-07-28
Attending: EMERGENCY MEDICINE
Payer: MEDICAID

## 2020-07-28 VITALS
HEIGHT: 64 IN | TEMPERATURE: 98 F | OXYGEN SATURATION: 98 % | DIASTOLIC BLOOD PRESSURE: 81 MMHG | BODY MASS INDEX: 30.73 KG/M2 | WEIGHT: 180 LBS | HEART RATE: 71 BPM | RESPIRATION RATE: 20 BRPM | SYSTOLIC BLOOD PRESSURE: 120 MMHG

## 2020-07-28 DIAGNOSIS — M54.32 SCIATIC NERVE PAIN, LEFT: Primary | ICD-10-CM

## 2020-07-28 LAB — B-HCG UR QL: NEGATIVE

## 2020-07-28 PROCEDURE — 96372 THER/PROPH/DIAG INJ SC/IM: CPT

## 2020-07-28 PROCEDURE — 63600175 PHARM REV CODE 636 W HCPCS: Performed by: EMERGENCY MEDICINE

## 2020-07-28 PROCEDURE — 25000003 PHARM REV CODE 250: Performed by: EMERGENCY MEDICINE

## 2020-07-28 PROCEDURE — 51798 US URINE CAPACITY MEASURE: CPT

## 2020-07-28 PROCEDURE — 99284 EMERGENCY DEPT VISIT MOD MDM: CPT | Mod: 25

## 2020-07-28 PROCEDURE — 81025 URINE PREGNANCY TEST: CPT

## 2020-07-28 RX ORDER — MELOXICAM 7.5 MG/1
7.5 TABLET ORAL DAILY
Status: DISCONTINUED | OUTPATIENT
Start: 2020-07-29 | End: 2020-07-28

## 2020-07-28 RX ORDER — PHENTERMINE HYDROCHLORIDE 37.5 MG/1
CAPSULE ORAL
COMMUNITY
Start: 2017-01-13 | End: 2021-03-09

## 2020-07-28 RX ORDER — MELOXICAM 7.5 MG/1
7.5 TABLET ORAL DAILY
Qty: 10 TABLET | Refills: 0 | Status: SHIPPED | OUTPATIENT
Start: 2020-07-28 | End: 2020-08-07

## 2020-07-28 RX ORDER — HYDROMORPHONE HYDROCHLORIDE 2 MG/ML
1 INJECTION, SOLUTION INTRAMUSCULAR; INTRAVENOUS; SUBCUTANEOUS
Status: COMPLETED | OUTPATIENT
Start: 2020-07-28 | End: 2020-07-28

## 2020-07-28 RX ORDER — MELOXICAM 7.5 MG/1
7.5 TABLET ORAL ONCE
Status: COMPLETED | OUTPATIENT
Start: 2020-07-28 | End: 2020-07-28

## 2020-07-28 RX ADMIN — HYDROMORPHONE HYDROCHLORIDE 1 MG: 2 INJECTION, SOLUTION INTRAMUSCULAR; INTRAVENOUS; SUBCUTANEOUS at 03:07

## 2020-07-28 RX ADMIN — MELOXICAM 7.5 MG: 7.5 TABLET ORAL at 04:07

## 2020-07-28 NOTE — ED PROVIDER NOTES
"EM PHYSICIAN NOTE    HPI  This patient presents with a complaint of   Chief Complaint   Patient presents with    Back Pain     lower back pain started yesterday    Numbness     left leg and arm numbness started yesterday       HPI: Crystal Tsai is a 29 year old female who presents to the ED with a sudden onset of numbness originating in the left lower extremity and extending up to the left arm 30 minutes ago. The patient states her current symptoms began while driving, and was accompanied by a single episode of enuresis. Patient reports being seen by her chiropractor today due to shooting pain from the bilateral lower extremities to the lower back. Her chiropractor recommended she be seen by a physician. The pain is not alleviated by ibuprofen and she denies having other pain medications. Past medical history includes interstitial cystitis and she is currently on her menstrual period.  She has a long history of back pain in the past and has had MRIs which have not lead to surgery in the past.  She also has interstitial cystitis which has caused her chronic pain.  She reports that she is not on chronic pain medications and that the ibuprofen is not helping.  She began having a worsening of her typical low back pain yesterday and went to see a chiropractor today and had no improvement.  After her visit with the chiropractor she was concerned because the pain was radiating down her left leg similar to past episodes a sciatica and she felt as if she had too much pain to walk.  While the pain with that at its worse she felt as if she needed to urinate and peed a little" on herself.  She has had multiple steroid injections in the past for her low back pain.    REVIEW of PMH, SOC History and Family History:  Past Medical History:   Diagnosis Date    Interstitial cystitis     Kidney stone     Migraine     UTI (urinary tract infection)      Past Surgical History:   Procedure Laterality Date    ABDOMINAL " "SURGERY      CYSTOSCOPY W/ RETROGRADES Bilateral 1/4/2019    Procedure: CYSTOSCOPY, WITH RETROGRADE PYELOGRAM; hydrodistention;  Surgeon: Kirsty Acevedo MD;  Location: [facility] OR;  Service: Urology;  Laterality: Bilateral;  RN PREOP 12/31/2018    EXAMINATION UNDER ANESTHESIA N/A 12/17/2018    Procedure: Exam under anesthesia;  Surgeon: Edilson Santana MD;  Location: [facility] OR;  Service: General;  Laterality: N/A;    IA EXPLORATORY OF ABDOMEN      PROCTOSIGMOIDOSCOPY N/A 12/17/2018    Procedure: PROCTOSIGMOIDOSCOPY;  Surgeon: Edilson Santana MD;  Location: [facility] OR;  Service: General;  Laterality: N/A;       Review of patient's allergies indicates:   Allergen Reactions    Naproxen Hives       Family history and social history reviewed.      REVIEW of SYSTEMS  Source: Patient  The nurse's notes and triage vital signs were reviewed.  GENERAL/CONSTITUTIONAL: There is no report of fever, fatigue, weakness, or unexplained weight loss.  CARDIOVASCULAR: There is no report of chest pain   RESPIRATORY: There is no report of cough or SOB  GASTROINTESTINAL: There is no report of nausea, vomiting, diarrhea  MUSCULOSKELETAL: See HPI  SKIN AND BREASTS: There is no report of easy bruising, skin redness, skin rash.  HEMATOLOGIC/LYMPHATIC: There is no report of anemia, bleeding or clotting defects. There is no report of anticoagulant use.  The remainder of the ROS is negative.  NEUROLOGY: See HPI  GYN:  Last menstrual cycle is current.  She has the nexplanon subdermal contraceptive device.  She has not been as sexually active for more than 3 months and denies any likelihood of being pregnant and does not wish to wait for pregnancy test before receiving analgesics.      PHYSICAL EXAMINATION    ED Triage Vitals [07/28/20 1416]   Enc Vitals Group      BP (!) 151/92      Pulse 107      Resp 20      Temp 98 °F (36.7 °C)      Temp src       SpO2 99 %      Weight 180 lb      Height 5' 4"      Head Circumference       Peak Flow     "   Pain Score       Pain Loc       Pain Edu?       Excl. in GC?      Vital signs and Pulse Ox reviewed in clinical context. Abnormalities noted: HTN noted and discussed need for close followup with primary care physician.    Pt's level of consciousness is alert, and the patient is in moderate distress. Tearful and anxious appearance.  Skin: warm, pink and dry  Mucosa:moist  Head and Neck: WNL, EOMI, no nystagmus  Cardiac exam:  Regular rate and rhythm  Pulmonary exam: unlabored and clear  Abd Exam: soft nontender   Musculoskeletal: no joint tenderness, deformity or swelling   Neurologic: GCS 15. 5 over 5 strength, cranial nerves intact, neck supple, no facial droop. Patient has normal extensor and flexor hallucis strength.  Normal sensation.      Initial Impression: Called to triage to assess this 29 year old female who presents with numbness of the left lower extremity and chronic lower back pain. Denies history of diabetes or hypertension. Denies chronic pain medication. No facial droop, cranial nerve deficits, or hemiparesis appreciated on examination. Not a candidate for stroke code.MDM: there are no focal neurologic deficits.   I am not offering steroids due history of frequent steroid injections and risk of potential adverse effects. Post-void residual to assess for urinary retention: 75cc.  There is no evidence of cauda equina syndrome.  Plan: Based on these symptoms and my findings, I ordered UPT and analgesics   Micelle ROSELINE August MD, 2:37 PM 7/28/2020      Medical decision making:   Nurses notes and Vital Signs reviewed.  Orders Placed This Encounter   Procedures    Pregnancy, urine rapid    Bladder scan       ED Course as of Jul 28 1722   Tue Jul 28, 2020   1620 Patient is able to ambulate without weakness to the bathroom an urinate.  She reports pain but there is no focal weakness and her gait is steady.    []   1718 Postvoid residual is 75 cc    []      ED Course User Index  [MH] Kane August  MD       Diagnoses that have been ruled out:   None   Diagnoses that are still under consideration:   None   Final diagnoses:   Sciatic nerve pain, left            Follow-up Information     Follow up With Specialties Details Why Contact Info    Jayme Romano NP Internal Medicine Schedule an appointment as soon as possible for a visit in 1 week For Follow-up and Recheck [address] [id number]  [phone]          ED Prescriptions     Medication Sig Dispense Start Date End Date Auth. Provider    meloxicam (MOBIC) 7.5 MG tablet Take 1 tablet (7.5 mg total) by mouth once daily. for 10 days 10 tablet 7/28/2020 8/7/2020 Kane August MD          This note was created using Dictation Software.  This program may occasionally misinterpret certain words and phrases.      I attest that I personally performed the services documented by the scribe and acknowledged and confirm the content of the note.   Nurses notes were reviewed.  Kane August MD  07/28/20 1548       Kane August MD  07/28/20 1555       Kane August MD  07/28/20 1726

## 2020-07-28 NOTE — DISCHARGE INSTRUCTIONS
As we discussed, it is important that you return to the ER for any new concerns or symptoms, worsening of your existing symptoms, if you do not completely improve, or if you are unable to be seen by your primary care provider.    Some medical conditions are difficult to diagnose and may not be identified during an ER visit. Today, we did not find a medical condition that required inpatient admission, but please remember that medical conditions can change, and we cannot predict how you will be feeling tomorrow or the next day, so if you have any worsening or new symptoms, you should not hesitate to return to the emergency department for reevaluation.     Be sure to follow up with your primary care doctor for a recheck and to review any labs/imaging that were performed today.  If you do not have a primary care doctor, you may contact the one listed on your discharge paperwork or you may also call the [facility] Appointment Desk at [phone] to schedule an appointment with one.       All medications have side effects.  We have done our best to select a medication for you that will treat your health problem and have the least amount of side effects.  If at any time while or after you take this medication you develops new symptoms or have any concerns, it is important you stop taking the medication and call your primary care provider or return to the emergency department for evaluation.    Do not drive or operate heavy machinery for 24 hours if you have received any pain medications, sedatives or mood altering drugs during your ER visit.

## 2020-07-28 NOTE — ED TRIAGE NOTES
Pt arrived to the ED due to acute lower back pain that radiates to her left leg and numbness to her left leg while she was driving today. Pt has a hx of sciatica

## 2020-08-13 ENCOUNTER — CLINICAL SUPPORT (OUTPATIENT)
Dept: REHABILITATION | Facility: HOSPITAL | Age: 30
End: 2020-08-13
Attending: EMERGENCY MEDICINE
Payer: MEDICAID

## 2020-08-13 DIAGNOSIS — M54.42 CHRONIC LEFT-SIDED LOW BACK PAIN WITH LEFT-SIDED SCIATICA: ICD-10-CM

## 2020-08-13 DIAGNOSIS — R29.898 DECREASED STRENGTH OF LOWER EXTREMITY: ICD-10-CM

## 2020-08-13 DIAGNOSIS — G89.29 CHRONIC LEFT-SIDED LOW BACK PAIN WITH LEFT-SIDED SCIATICA: ICD-10-CM

## 2020-08-13 DIAGNOSIS — M53.86 DECREASED ROM OF LUMBAR SPINE: ICD-10-CM

## 2020-08-13 DIAGNOSIS — M54.32 SCIATIC NERVE PAIN, LEFT: ICD-10-CM

## 2020-08-13 PROCEDURE — 97162 PT EVAL MOD COMPLEX 30 MIN: CPT | Mod: PN

## 2020-08-13 PROCEDURE — 97110 THERAPEUTIC EXERCISES: CPT | Mod: PN
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
## 2020-08-20 ENCOUNTER — CLINICAL SUPPORT (OUTPATIENT)
Dept: REHABILITATION | Facility: HOSPITAL | Age: 30
End: 2020-08-20
Attending: EMERGENCY MEDICINE
Payer: MEDICAID

## 2020-08-20 DIAGNOSIS — G89.29 CHRONIC LEFT-SIDED LOW BACK PAIN WITH LEFT-SIDED SCIATICA: ICD-10-CM

## 2020-08-20 DIAGNOSIS — R29.898 DECREASED STRENGTH OF LOWER EXTREMITY: ICD-10-CM

## 2020-08-20 DIAGNOSIS — M54.42 CHRONIC LEFT-SIDED LOW BACK PAIN WITH LEFT-SIDED SCIATICA: ICD-10-CM

## 2020-08-20 DIAGNOSIS — M53.86 DECREASED ROM OF LUMBAR SPINE: ICD-10-CM

## 2020-08-20 PROCEDURE — 97110 THERAPEUTIC EXERCISES: CPT | Mod: PN

## 2020-08-20 NOTE — PROGRESS NOTES
"    Physical Therapy Daily Treatment Note     Name: Crystal Tsai  Clinic Number: [id number]    Therapy Diagnosis:   Encounter Diagnoses   Name Primary?    Chronic left-sided low back pain with left-sided sciatica     Decreased ROM of lumbar spine     Decreased strength of lower extremity       Physician: Kane August MD    Visit Date: 8/20/2020    Physician Orders: PT Eval and Treat   Medical Diagnosis from Referral: M54.32 (ICD-10-CM) - Sciatic nerve pain, left  Evaluation Date: 8/13/2020  Authorization Period Expiration: 8/20/21  Plan of Care Expiration: 11/13/2020  Visit #/Visits authorized: 1/ 5     Time In: 1453  Time Out: 1613  Total Billable Time: 70 minutes    Precautions: Standard and interstitial cystitis    Subjective     Pt reports: was really sore for the two days following dry needling last visit. Exercise where she props herself on her elbows really helps with the pain. Still having familiar symptoms and one episode of spasms. Feet are starting to hurt as well. She also reports feeling tightness and spasms in L anterior thigh when her symptoms down LLE are constant for more than 5 minutes. Denies numbness/tingling in this area. Not same as bladder pain from interstitial cystitis.     She was compliant with home exercise program.  Response to previous treatment: soreness  Functional change: progressing    Pain: 7/10  Location: left lumbar and down LLE to foot     Objective   Interventions performed are BOLDED    Hope received therapeutic exercises to develop strength, ROM and core stabilization for 30 minutes including:    +Upright bike x7'  LTR x10  DKTC x10  +BYRON x1'  Prone press ups x10  +EIS x15 (to point of pressure in back)  +medx lumbar extension 0-55* 40# x20  +medx core torso rotation 20# x15 ea  Supine hamstring stretch 2x15" ea    Hope received the following manual therapy techniques: dry needling were applied to the: B lumbosacral paraspinals for 40 minutes, " including:    +PA mobs L1-5    Patient provided written and verbal consent to receive functional dry needling at today's visit (see consent form scanned into chart). Pt cleared of contraindications and verbal and written consent acquired. Pt given option of copy of consent form. Pt educated on benefits and potential side effects of DN.   FDN performed to B lumbar paraspinals (L - L3-S1; R - L2-S1), and L piriformis (1) using unilateral twisting technique. E-Stim added to most proximal and distal needles on R and L side and middle needle on L lumbar and L piriformis x15' at 2 Hz, 250 microseconds, with intensity to visible muscle contraction (~2.0) FDN performed to reduce pain and muscle tension, promote blood flow, and improve ROM and function x 30 minutes. Pt reports familiar pain and symptoms into LLE down to foot with needle insertion in L piriformis and L3 and L4 lumbar paraspinals on L side. Pt was educated on what to expect following the procedure and she verbalized understanding. Dry needling performed by Alcon Santos, PT, DPT.     Hope received hot pack for 10 minutes to lumbar c/ pt supine.      Home Exercises Provided and Patient Education Provided     Education provided:   - rationale for new exercises  - referral pain pattern of iliopsoas    Written Home Exercises Provided: yes and cont prior HEP.  Exercises were reviewed and Sandra was able to demonstrate them prior to the end of the session.  Hope demonstrated good  understanding of the education provided.     See EMR under Patient Instructions for exercises provided 8/20/2020.    Assessment     Pt presents to clinic with similar symptoms compared to previous visit. Has been compliant with HEP program. Reports relief with extension based exercises. Has continued trying exercise with modifications. Reports another pain presentation in L anterior thigh that radiates to ipsilateral lower back paraspinals with palpation. Discussed with pt about iliopsoas  referral pain pattern as potential root for this pain. Familiar pain reproduced with dry needling today. See in OBJECTIVE for more details about dry needling session. Added medx machines today for trunk/core strengthening. Lumbar extension performed in pain-free ROM. No pain or symptoms reported. Pt reports hip pulling at end range on core torso rotation machine. Pt reports increased pain with EIS. Instructed to perform in pain-free ROM. Added to HEP.     Hope is progressing well towards her goals.   Pt prognosis is Good.     Pt will continue to benefit from skilled outpatient physical therapy to address the deficits listed in the problem list box on initial evaluation, provide pt/family education and to maximize pt's level of independence in the home and community environment.     Pt's spiritual, cultural and educational needs considered and pt agreeable to plan of care and goals.     Anticipated barriers to physical therapy: none    Goals:   Short Term Goals: 4 weeks   - Pt to score 52% on FOTO. - progressing  - Pt to report at-worst pain 6/10.  - progressing  - Pt to report little difficulty with household chores.   - Pt to improve standing/walking tolerance to 1 hour for work-related activities. - progressing  - Pt to tolerate modified workout routine with little/no increase in symptoms.   - progressing  - Pt to be compliant with HEP at least 5x/wk to demonstrate understanding of condition and willingness to self-manage symptoms.  - progressing        Long Term Goals: 8 weeks   - Pt to score 59% on FOTO.   - Pt to report at-worst pain 3/10.   - Pt to report no difficulty with household chores.   - Pt to improve standing/walking tolerance to 2 hour for work-related activities.   - Pt to return to full exercise routine with little/no pain.   - Pt to be compliant with HEP at least 5x/wk to demonstrate competency and independence with management of symptoms.     Plan   Plan of Care Certification Period: 8/13/2020 to  11/13/2020.     Outpatient Physical Therapy 1 times weekly for 8 weeks     Assess tolerance to progressions with treatment. Continue to progress with lumbar ROM as tolerated and core/trunk strengthening. Begin with hip and functional CKC BLE strengthening.     Alcon Santos, PT, DPT  8/20/2020

## 2020-08-27 ENCOUNTER — CLINICAL SUPPORT (OUTPATIENT)
Dept: REHABILITATION | Facility: HOSPITAL | Age: 30
End: 2020-08-27
Attending: EMERGENCY MEDICINE
Payer: MEDICAID

## 2020-08-27 DIAGNOSIS — G89.29 CHRONIC LEFT-SIDED LOW BACK PAIN WITH LEFT-SIDED SCIATICA: ICD-10-CM

## 2020-08-27 DIAGNOSIS — R29.898 DECREASED STRENGTH OF LOWER EXTREMITY: ICD-10-CM

## 2020-08-27 DIAGNOSIS — M53.86 DECREASED ROM OF LUMBAR SPINE: ICD-10-CM

## 2020-08-27 DIAGNOSIS — M54.42 CHRONIC LEFT-SIDED LOW BACK PAIN WITH LEFT-SIDED SCIATICA: ICD-10-CM

## 2020-08-27 PROCEDURE — 97110 THERAPEUTIC EXERCISES: CPT | Mod: PN

## 2020-08-27 NOTE — PROGRESS NOTES
"    Physical Therapy Daily Treatment Note     Name: Crystal Tsai  [facility] Number: [id number]    Therapy Diagnosis:   Encounter Diagnoses   Name Primary?    Chronic left-sided low back pain with left-sided sciatica     Decreased ROM of lumbar spine     Decreased strength of lower extremity       Physician: Kane August MD    Visit Date: 8/27/2020    Physician Orders: PT Eval and Treat   Medical Diagnosis from Referral: M54.32 (ICD-10-CM) - Sciatic nerve pain, left  Evaluation Date: 8/13/2020  Authorization Period Expiration: 10/8/2020  Plan of Care Expiration: 11/13/2020  Visit #/Visits authorized: 2/8    Time In: 1720  Time Out: 1828  Total Billable Time: 48 minutes    Precautions: Standard and interstitial cystitis    Subjective     Pt reports: has been able to workout more with less pain. Ran 4x last week. Feeling less pain in her LLE. Still has difficulty with bending backwards but continuing to work on exercises at home and stretching a lot before her exercise routines.     She was compliant with home exercise program.  Response to previous treatment: soreness  Functional change: progressing    Pain: 6/10  Location: left lumbar and down LLE to foot     Objective   Interventions performed are BOLDED    Hope received therapeutic exercises to develop strength, ROM and core stabilization for 25 minutes including:    +Upright bike x8'  LTR x10 (on heat after DN)  DKTC x10 (on heat after DN)  +BYRON x1'  Prone press ups x10  +EIS x15 (to point of pressure in back)  +medx lumbar extension 0-55* 40# x20  +medx core torso rotation 20# x15 ea  Supine hamstring stretch 2x15" ea    Hope received the following manual therapy techniques: dry needling were applied to the: B lumbosacral paraspinals for 35 minutes, including:    PA mobs L1-5    Patient provided written and verbal consent to receive functional dry needling at today's visit (see consent form scanned into chart). Pt cleared of contraindications and " verbal and written consent acquired. Pt given option of copy of consent form. Pt educated on benefits and potential side effects of DN.   FDN performed to B lumbar paraspinals/multifidi in alternating pattern (L - L3-S1; R - L2-S1), and L piriformis (1) using unilateral twisting technique. E-Stim added to most proximal and distal needles on R and L side and middle needle on L lumbar and L piriformis x15' at 2 Hz, 250 microseconds, with intensity to visible muscle contraction (~3.0) FDN performed to reduce pain and muscle tension, promote blood flow, and improve ROM and function x 30 minutes. Pt reports familiar pain and increased from last visit with L3 and L4 lumbar paraspinals and multifidi on B side. Pt was educated on what to expect following the procedure and she verbalized understanding. Dry needling performed by Alcon Santos, PT, DPT.     Hope received hot pack for 10 minutes to lumbar c/ pt supine.      Home Exercises Provided and Patient Education Provided     Education provided:   - rationale for new exercises  - referral pain pattern of iliopsoas    Written Home Exercises Provided: yes and cont prior HEP.  Exercises were reviewed and Hope was able to demonstrate them prior to the end of the session.  Hope demonstrated good  understanding of the education provided.     See EMR under Patient Instructions for exercises provided 8/20/2020.    Assessment     Pt presents to clinic with reduced symptoms compared to previous visit and improvement with tolerance to workout and HEP. Still has pain with EIS. Continued instruction for performing within non-pain increasing ROM. Performed dry needling and added multifidi. Increased tenderness and reproduction of symptoms compared to previous visit. No radicular symptoms to LLE with piriformis dry needling today. See in OBJECTIVE for more details about dry needling today. Pt apprehensive to exercise machines today but felt better and looser afterwards. Performed DKTC  and LTR on moist heat at end of session.     Hope is progressing well towards her goals.   Pt prognosis is Good.     Pt will continue to benefit from skilled outpatient physical therapy to address the deficits listed in the problem list box on initial evaluation, provide pt/family education and to maximize pt's level of independence in the home and community environment.     Pt's spiritual, cultural and educational needs considered and pt agreeable to plan of care and goals.     Anticipated barriers to physical therapy: none    Goals:   Short Term Goals: 4 weeks   - Pt to score 52% on FOTO. - progressing  - Pt to report at-worst pain 6/10.  - progressing  - Pt to report little difficulty with household chores.   - Pt to improve standing/walking tolerance to 1 hour for work-related activities. - progressing  - Pt to tolerate modified workout routine with little/no increase in symptoms.   - progressing  - Pt to be compliant with HEP at least 5x/wk to demonstrate understanding of condition and willingness to self-manage symptoms.  - progressing        Long Term Goals: 8 weeks   - Pt to score 59% on FOTO.   - Pt to report at-worst pain 3/10.   - Pt to report no difficulty with household chores.   - Pt to improve standing/walking tolerance to 2 hour for work-related activities.   - Pt to return to full exercise routine with little/no pain.   - Pt to be compliant with HEP at least 5x/wk to demonstrate competency and independence with management of symptoms.     Plan   Plan of Care Certification Period: 8/13/2020 to 11/13/2020.     Outpatient Physical Therapy 1 times weekly for 8 weeks     Assess tolerance to progressions with treatment. Continue to progress with lumbar ROM as tolerated and core/trunk strengthening. Begin with hip and functional CKC BLE strengthening.     Alcon Santos, PT, DPT  8/27/2020

## 2020-09-02 NOTE — TELEPHONE ENCOUNTER
----- Message from Scott Leija MD sent at 8/31/2020  7:58 AM CDT -----  CK has normalized.  Hep B panel negative.  Vitamin D looks good.   Spoke with patient about positive urine culture also gave her instructions to stop Keflex and start the bactrim.. She wanted to kno if she could have her urine re-tested before her surgery date to make sure the infection is completely gone.. ARAVIND.

## 2020-09-04 ENCOUNTER — OFFICE VISIT (OUTPATIENT)
Dept: OBSTETRICS AND GYNECOLOGY | Facility: CLINIC | Age: 30
End: 2020-09-04
Payer: MEDICAID

## 2020-09-04 VITALS — DIASTOLIC BLOOD PRESSURE: 68 MMHG | SYSTOLIC BLOOD PRESSURE: 112 MMHG | BODY MASS INDEX: 30.9 KG/M2 | WEIGHT: 180 LBS

## 2020-09-04 DIAGNOSIS — Z11.3 SCREENING FOR STD (SEXUALLY TRANSMITTED DISEASE): Primary | ICD-10-CM

## 2020-09-04 PROCEDURE — 99213 OFFICE O/P EST LOW 20 MIN: CPT | Mod: PBBFAC | Performed by: OBSTETRICS & GYNECOLOGY

## 2020-09-04 PROCEDURE — 87491 CHLMYD TRACH DNA AMP PROBE: CPT | Mod: 59

## 2020-09-04 PROCEDURE — 99212 PR OFFICE/OUTPT VISIT, EST, LEVL II, 10-19 MIN: ICD-10-PCS | Mod: S$PBB,,, | Performed by: OBSTETRICS & GYNECOLOGY

## 2020-09-04 PROCEDURE — 87481 CANDIDA DNA AMP PROBE: CPT | Mod: 59

## 2020-09-04 PROCEDURE — 99212 OFFICE O/P EST SF 10 MIN: CPT | Mod: S$PBB,,, | Performed by: OBSTETRICS & GYNECOLOGY

## 2020-09-04 PROCEDURE — 99999 PR PBB SHADOW E&M-EST. PATIENT-LVL III: CPT | Mod: PBBFAC,,, | Performed by: OBSTETRICS & GYNECOLOGY

## 2020-09-04 PROCEDURE — 99999 PR PBB SHADOW E&M-EST. PATIENT-LVL III: ICD-10-PCS | Mod: PBBFAC,,, | Performed by: OBSTETRICS & GYNECOLOGY

## 2020-09-04 PROCEDURE — 87801 DETECT AGNT MULT DNA AMPLI: CPT

## 2020-09-04 NOTE — PROGRESS NOTES
Cc:  Exposure to STD    HPI:  29 yr  who has Nexplanon for contraception placed 2019.  Pt states she was told by a former partner that she should be checked for an STI but was not told what.  Pt admits increased d/c and odor, but denies itching or pain.    Vitals:    20 1618   BP: 112/68   Weight: 81.7 kg (180 lb 0.1 oz)     Physical Exam:   Constitutional: She is oriented to person, place, and time. She appears well-developed and well-nourished. No distress.    HENT:   Head: Normocephalic and atraumatic.     Neck: Normal range of motion.    Cardiovascular: Normal rate.     Pulmonary/Chest: Effort normal. No respiratory distress.        Abdominal: Soft. She exhibits no distension and no mass. There is no abdominal tenderness. There is no rebound and no guarding.     Genitourinary:    Vagina, uterus, right adnexa and left adnexa normal.      Pelvic exam was performed with patient supine.   There is no rash, tenderness, lesion or injury on the right labia. There is no rash, tenderness, lesion or injury on the left labia. Cervix is normal. There is a normal right adnexa and a normal left adnexa. Right adnexum displays no mass, no tenderness and no fullness. Left adnexum displays no mass, no tenderness and no fullness. Labial bartholins normal.          Musculoskeletal: Normal range of motion and moves all extremeties. No tenderness.       Neurological: She is alert and oriented to person, place, and time. No cranial nerve deficit. Coordination normal.    Skin: She is not diaphoretic.    Psychiatric: She has a normal mood and affect. Her behavior is normal. Judgment and thought content normal.     Assessment/Plan  1. Screening for STD (sexually transmitted disease)  C. trachomatis/N. gonorrhoeae by AMP DNA    Vaginosis Screen by DNA Probe    RPR    HIV 1/2 Ag/Ab (4th Gen)    Hepatitis C Antibody     Discussed w/ pt if she can find out what she may have been exposed to.  Discussed treating without a  positive result if known exposure.

## 2020-09-08 ENCOUNTER — TELEPHONE (OUTPATIENT)
Dept: OBSTETRICS AND GYNECOLOGY | Facility: CLINIC | Age: 30
End: 2020-09-08

## 2020-09-08 NOTE — TELEPHONE ENCOUNTER
Spoke with pt results given we call pt once other results are in       ----- Message from Christel Shah sent at 9/8/2020  1:59 PM CDT -----  Name of Who is Calling: LOGAN LEIVA [[id number]]      What is the request in detail: Pt is calling to request lab results. Please contact to further discuss and advise.        Can the clinic reply by [facility]: N      What Number to Call Back if not in [facility]:[phone]

## 2020-09-10 ENCOUNTER — TELEPHONE (OUTPATIENT)
Dept: OBSTETRICS AND GYNECOLOGY | Facility: HOSPITAL | Age: 30
End: 2020-09-10

## 2020-09-10 DIAGNOSIS — N76.0 BACTERIAL VAGINOSIS: Primary | ICD-10-CM

## 2020-09-10 DIAGNOSIS — B96.89 BACTERIAL VAGINOSIS: Primary | ICD-10-CM

## 2020-09-10 RX ORDER — METRONIDAZOLE 500 MG/1
500 TABLET ORAL EVERY 12 HOURS
Qty: 14 TABLET | Refills: 0 | Status: SHIPPED | OUTPATIENT
Start: 2020-09-10 | End: 2020-09-17

## 2020-09-11 ENCOUNTER — TELEPHONE (OUTPATIENT)
Dept: OBSTETRICS AND GYNECOLOGY | Facility: CLINIC | Age: 30
End: 2020-09-11

## 2020-09-11 NOTE — TELEPHONE ENCOUNTER
Attempted to return call no answer left message for call  Back     ----- Message from Leda Valenzuela sent at 9/10/2020  4:47 PM CDT -----  Type: Patient Call Back    Who called: Self     What is the request in detail: calling in regards to speaking with Nurse about results     Can the clinic reply by [facility]? Call back     Would the patient rather a call back or a response via [facility]? Call back     Best call back number: [phone]

## 2020-09-14 ENCOUNTER — DOCUMENTATION ONLY (OUTPATIENT)
Dept: REHABILITATION | Facility: HOSPITAL | Age: 30
End: 2020-09-14

## 2020-09-14 NOTE — PROGRESS NOTES
Outpatient Therapy Discharge Summary     Name: Crystal Tsai  [facility] Number: [id number]    Therapy Diagnosis:        Encounter Diagnoses   Name Primary?    Chronic left-sided low back pain with left-sided sciatica      Decreased ROM of lumbar spine      Decreased strength of lower extremity        Physician: Kane August MD      Physician Orders: PT Eval and Treat   Medical Diagnosis from Referral: M54.32 (ICD-10-CM) - Sciatic nerve pain, left  Evaluation Date: 8/13/2020        Date of Last visit: 8/27/2020  Total Visits Received: 3  Cancelled Visits: 1  No Show Visits: 3    Assessment    Goals: not met; not assessed 2* unanticipated d/c      Discharge reason: Patient has not attended therapy since 8/27/2020 and Other:  Has n/s 3 consecutive PT appts. Per policy, discharge from PT.     Plan   This patient is discharged from Physical Therapy    Alcon Santos, PT, DPT  9/14/2020

## 2020-09-14 NOTE — PROGRESS NOTES
Physical Therapy    Patient did not show up for today's therapy session. D/C from PT per policy. Pt has n/s 3 consecutive appts.     Cancel: 1    No Show: 3      Alcon Santos, PT, DPT

## 2020-10-03 LAB
C TRACH DNA SPEC QL NAA+PROBE: NOT DETECTED
N GONORRHOEA DNA SPEC QL NAA+PROBE: NOT DETECTED

## 2021-03-09 ENCOUNTER — OFFICE VISIT (OUTPATIENT)
Dept: OBSTETRICS AND GYNECOLOGY | Facility: CLINIC | Age: 31
End: 2021-03-09
Payer: MEDICAID

## 2021-03-09 ENCOUNTER — TELEPHONE (OUTPATIENT)
Dept: OBSTETRICS AND GYNECOLOGY | Facility: CLINIC | Age: 31
End: 2021-03-09

## 2021-03-09 VITALS
SYSTOLIC BLOOD PRESSURE: 110 MMHG | DIASTOLIC BLOOD PRESSURE: 78 MMHG | WEIGHT: 191.81 LBS | BODY MASS INDEX: 32.74 KG/M2 | HEIGHT: 64 IN

## 2021-03-09 DIAGNOSIS — N90.89 VULVAL LESION: Primary | ICD-10-CM

## 2021-03-09 DIAGNOSIS — N90.89 VULVAR IRRITATION: ICD-10-CM

## 2021-03-09 PROCEDURE — 99213 PR OFFICE/OUTPT VISIT, EST, LEVL III, 20-29 MIN: ICD-10-PCS | Mod: S$PBB,,, | Performed by: OBSTETRICS & GYNECOLOGY

## 2021-03-09 PROCEDURE — 99213 OFFICE O/P EST LOW 20 MIN: CPT | Mod: PBBFAC | Performed by: OBSTETRICS & GYNECOLOGY

## 2021-03-09 PROCEDURE — 99999 PR PBB SHADOW E&M-EST. PATIENT-LVL III: ICD-10-PCS | Mod: PBBFAC,,, | Performed by: OBSTETRICS & GYNECOLOGY

## 2021-03-09 PROCEDURE — 99213 OFFICE O/P EST LOW 20 MIN: CPT | Mod: S$PBB,,, | Performed by: OBSTETRICS & GYNECOLOGY

## 2021-03-09 PROCEDURE — 99999 PR PBB SHADOW E&M-EST. PATIENT-LVL III: CPT | Mod: PBBFAC,,, | Performed by: OBSTETRICS & GYNECOLOGY

## 2021-03-09 RX ORDER — DEXAMETHASONE 4 MG/1
4 TABLET ORAL DAILY
COMMUNITY
Start: 2021-01-26 | End: 2021-06-21

## 2021-03-09 RX ORDER — TRIAMCINOLONE ACETONIDE 0.25 MG/G
CREAM TOPICAL
Status: ON HOLD | COMMUNITY
Start: 2021-01-26 | End: 2021-08-06 | Stop reason: HOSPADM

## 2021-03-09 RX ORDER — VALACYCLOVIR HYDROCHLORIDE 1 G/1
1000 TABLET, FILM COATED ORAL DAILY
Qty: 5 TABLET | Refills: 0 | Status: ON HOLD | OUTPATIENT
Start: 2021-03-09 | End: 2021-08-06 | Stop reason: HOSPADM

## 2021-03-09 RX ORDER — PHENTERMINE HYDROCHLORIDE 37.5 MG/1
18.75 TABLET ORAL 2 TIMES DAILY
Status: ON HOLD | COMMUNITY
Start: 2021-02-02 | End: 2021-08-06 | Stop reason: HOSPADM

## 2021-03-10 ENCOUNTER — LAB VISIT (OUTPATIENT)
Dept: LAB | Facility: HOSPITAL | Age: 31
End: 2021-03-10
Attending: OBSTETRICS & GYNECOLOGY
Payer: MEDICAID

## 2021-03-10 DIAGNOSIS — N90.89 VULVAL LESION: ICD-10-CM

## 2021-03-10 DIAGNOSIS — N90.89 VULVAR IRRITATION: ICD-10-CM

## 2021-03-10 PROCEDURE — 36415 COLL VENOUS BLD VENIPUNCTURE: CPT | Performed by: OBSTETRICS & GYNECOLOGY

## 2021-03-10 PROCEDURE — 86696 HERPES SIMPLEX TYPE 2 TEST: CPT | Performed by: OBSTETRICS & GYNECOLOGY

## 2021-03-10 PROCEDURE — 86695 HERPES SIMPLEX TYPE 1 TEST: CPT | Performed by: OBSTETRICS & GYNECOLOGY

## 2021-03-12 ENCOUNTER — TELEPHONE (OUTPATIENT)
Dept: OBSTETRICS AND GYNECOLOGY | Facility: CLINIC | Age: 31
End: 2021-03-12

## 2021-03-12 LAB
HSV1 IGG SERPL QL IA: POSITIVE
HSV2 IGG SERPL QL IA: NEGATIVE

## 2021-03-16 ENCOUNTER — TELEPHONE (OUTPATIENT)
Dept: OBSTETRICS AND GYNECOLOGY | Facility: CLINIC | Age: 31
End: 2021-03-16

## 2021-03-17 ENCOUNTER — TELEPHONE (OUTPATIENT)
Dept: OBSTETRICS AND GYNECOLOGY | Facility: CLINIC | Age: 31
End: 2021-03-17

## 2021-03-23 ENCOUNTER — TELEPHONE (OUTPATIENT)
Dept: OBSTETRICS AND GYNECOLOGY | Facility: CLINIC | Age: 31
End: 2021-03-23

## 2021-03-29 ENCOUNTER — OFFICE VISIT (OUTPATIENT)
Dept: OBSTETRICS AND GYNECOLOGY | Facility: CLINIC | Age: 31
End: 2021-03-29
Payer: MEDICAID

## 2021-03-29 VITALS — HEIGHT: 64 IN | DIASTOLIC BLOOD PRESSURE: 70 MMHG | SYSTOLIC BLOOD PRESSURE: 118 MMHG | BODY MASS INDEX: 32.92 KG/M2

## 2021-03-29 DIAGNOSIS — R39.89 SENSATION OF PRESSURE IN BLADDER AREA: ICD-10-CM

## 2021-03-29 DIAGNOSIS — N94.89 VULVAR BURNING: Primary | ICD-10-CM

## 2021-03-29 LAB
BILIRUB SERPL-MCNC: NEGATIVE MG/DL
BLOOD URINE, POC: NORMAL
CLARITY, POC UA: NORMAL
COLOR, POC UA: NORMAL
GLUCOSE UR QL STRIP: NORMAL
KETONES UR QL STRIP: NEGATIVE
LEUKOCYTE ESTERASE URINE, POC: NORMAL
NITRITE, POC UA: NEGATIVE
PH, POC UA: 5
PROTEIN, POC: NEGATIVE
SPECIFIC GRAVITY, POC UA: 1.02
UROBILINOGEN, POC UA: NORMAL

## 2021-03-29 PROCEDURE — 99214 PR OFFICE/OUTPT VISIT, EST, LEVL IV, 30-39 MIN: ICD-10-PCS | Mod: S$PBB,,, | Performed by: OBSTETRICS & GYNECOLOGY

## 2021-03-29 PROCEDURE — 87529 HSV DNA AMP PROBE: CPT | Performed by: OBSTETRICS & GYNECOLOGY

## 2021-03-29 PROCEDURE — 99212 OFFICE O/P EST SF 10 MIN: CPT | Mod: PBBFAC | Performed by: OBSTETRICS & GYNECOLOGY

## 2021-03-29 PROCEDURE — 36415 COLL VENOUS BLD VENIPUNCTURE: CPT | Performed by: OBSTETRICS & GYNECOLOGY

## 2021-03-29 PROCEDURE — 87591 N.GONORRHOEAE DNA AMP PROB: CPT | Mod: 59 | Performed by: OBSTETRICS & GYNECOLOGY

## 2021-03-29 PROCEDURE — 99999 PR PBB SHADOW E&M-EST. PATIENT-LVL II: ICD-10-PCS | Mod: PBBFAC,,, | Performed by: OBSTETRICS & GYNECOLOGY

## 2021-03-29 PROCEDURE — 99214 OFFICE O/P EST MOD 30 MIN: CPT | Mod: S$PBB,,, | Performed by: OBSTETRICS & GYNECOLOGY

## 2021-03-29 PROCEDURE — 87529 HSV DNA AMP PROBE: CPT | Mod: 59 | Performed by: OBSTETRICS & GYNECOLOGY

## 2021-03-29 PROCEDURE — 99999 PR PBB SHADOW E&M-EST. PATIENT-LVL II: CPT | Mod: PBBFAC,,, | Performed by: OBSTETRICS & GYNECOLOGY

## 2021-03-29 PROCEDURE — 81002 URINALYSIS NONAUTO W/O SCOPE: CPT | Mod: PBBFAC | Performed by: OBSTETRICS & GYNECOLOGY

## 2021-03-29 PROCEDURE — 30000890 MISCELLANEOUS SENDOUT TEST, BLOOD: Mod: 59 | Performed by: OBSTETRICS & GYNECOLOGY

## 2021-03-29 PROCEDURE — 87661 TRICHOMONAS VAGINALIS AMPLIF: CPT | Mod: 59 | Performed by: OBSTETRICS & GYNECOLOGY

## 2021-03-29 PROCEDURE — 87086 URINE CULTURE/COLONY COUNT: CPT | Performed by: OBSTETRICS & GYNECOLOGY

## 2021-03-29 PROCEDURE — 87481 CANDIDA DNA AMP PROBE: CPT | Mod: 59 | Performed by: OBSTETRICS & GYNECOLOGY

## 2021-03-29 PROCEDURE — 87491 CHLMYD TRACH DNA AMP PROBE: CPT | Performed by: OBSTETRICS & GYNECOLOGY

## 2021-03-29 RX ORDER — TERCONAZOLE 4 MG/G
1 CREAM VAGINAL NIGHTLY
Qty: 1 TUBE | Refills: 0 | Status: ON HOLD | OUTPATIENT
Start: 2021-03-29 | End: 2021-08-06 | Stop reason: HOSPADM

## 2021-03-30 LAB
C TRACH DNA SPEC QL NAA+PROBE: NOT DETECTED
N GONORRHOEA DNA SPEC QL NAA+PROBE: NOT DETECTED

## 2021-03-31 DIAGNOSIS — N76.0 BV (BACTERIAL VAGINOSIS): Primary | ICD-10-CM

## 2021-03-31 DIAGNOSIS — B96.89 BV (BACTERIAL VAGINOSIS): Primary | ICD-10-CM

## 2021-03-31 LAB
BACTERIA UR CULT: NORMAL
BACTERIAL VAGINOSIS DNA: POSITIVE
CANDIDA GLABRATA DNA: NEGATIVE
CANDIDA KRUSEI DNA: NEGATIVE
CANDIDA RRNA VAG QL PROBE: NEGATIVE
T VAGINALIS RRNA GENITAL QL PROBE: NEGATIVE

## 2021-03-31 RX ORDER — METRONIDAZOLE 500 MG/1
500 TABLET ORAL EVERY 12 HOURS
Qty: 14 TABLET | Refills: 0 | Status: SHIPPED | OUTPATIENT
Start: 2021-03-31 | End: 2021-04-07

## 2021-04-27 LAB
MISCELLANEOUS TEST NAME: NORMAL
REFERENCE LAB: NORMAL
SPECIMEN TYPE: NORMAL
TEST RESULT: NORMAL

## 2021-06-21 ENCOUNTER — HOSPITAL ENCOUNTER (EMERGENCY)
Facility: HOSPITAL | Age: 31
Discharge: HOME | End: 2021-06-21
Attending: EMERGENCY MEDICINE
Payer: MEDICAID

## 2021-06-21 VITALS
SYSTOLIC BLOOD PRESSURE: 117 MMHG | HEART RATE: 89 BPM | WEIGHT: 181 LBS | RESPIRATION RATE: 18 BRPM | DIASTOLIC BLOOD PRESSURE: 70 MMHG | BODY MASS INDEX: 30.9 KG/M2 | TEMPERATURE: 99 F | OXYGEN SATURATION: 100 % | HEIGHT: 64 IN

## 2021-06-21 DIAGNOSIS — H66.93 BILATERAL OTITIS MEDIA, UNSPECIFIED OTITIS MEDIA TYPE: Primary | ICD-10-CM

## 2021-06-21 DIAGNOSIS — J06.9 VIRAL URI WITH COUGH: ICD-10-CM

## 2021-06-21 DIAGNOSIS — J30.9 ALLERGIC RHINITIS, UNSPECIFIED SEASONALITY, UNSPECIFIED TRIGGER: ICD-10-CM

## 2021-06-21 LAB
B-HCG UR QL: NEGATIVE
CTP QC/QA: YES
CTP QC/QA: YES
INFLUENZA A ANTIGEN, POC: NEGATIVE
INFLUENZA B ANTIGEN, POC: NEGATIVE
POC RAPID STREP A: NEGATIVE
SARS-COV-2 RDRP RESP QL NAA+PROBE: NEGATIVE

## 2021-06-21 PROCEDURE — 87502 INFLUENZA DNA AMP PROBE: CPT | Mod: ER

## 2021-06-21 PROCEDURE — 99284 EMERGENCY DEPT VISIT MOD MDM: CPT | Mod: 25,ER

## 2021-06-21 PROCEDURE — 81025 URINE PREGNANCY TEST: CPT | Mod: ER | Performed by: EMERGENCY MEDICINE

## 2021-06-21 PROCEDURE — U0002 COVID-19 LAB TEST NON-CDC: HCPCS | Mod: ER | Performed by: NURSE PRACTITIONER

## 2021-06-21 RX ORDER — DEXTROMETHORPHAN HYDROBROMIDE, GUAIFENESIN 5; 100 MG/5ML; MG/5ML
650 LIQUID ORAL EVERY 8 HOURS
Qty: 20 TABLET | Refills: 0 | OUTPATIENT
Start: 2021-06-21 | End: 2021-07-26

## 2021-06-21 RX ORDER — AMOXICILLIN AND CLAVULANATE POTASSIUM 875; 125 MG/1; MG/1
1 TABLET, FILM COATED ORAL 2 TIMES DAILY
Qty: 20 TABLET | Refills: 0 | Status: SHIPPED | OUTPATIENT
Start: 2021-06-21 | End: 2021-07-01

## 2021-06-21 RX ORDER — CETIRIZINE HYDROCHLORIDE 10 MG/1
10 TABLET ORAL DAILY
Qty: 30 TABLET | Refills: 0 | Status: SHIPPED | OUTPATIENT
Start: 2021-06-21 | End: 2021-06-21 | Stop reason: SDUPTHER

## 2021-06-21 RX ORDER — CETIRIZINE HYDROCHLORIDE 10 MG/1
10 TABLET ORAL DAILY
Qty: 30 TABLET | Refills: 0 | Status: SHIPPED | OUTPATIENT
Start: 2021-06-21 | End: 2021-08-17

## 2021-06-21 RX ORDER — AMOXICILLIN AND CLAVULANATE POTASSIUM 875; 125 MG/1; MG/1
1 TABLET, FILM COATED ORAL 2 TIMES DAILY
Qty: 20 TABLET | Refills: 0 | Status: SHIPPED | OUTPATIENT
Start: 2021-06-21 | End: 2021-06-21 | Stop reason: SDUPTHER

## 2021-06-21 RX ORDER — DEXTROMETHORPHAN HYDROBROMIDE, GUAIFENESIN 5; 100 MG/5ML; MG/5ML
650 LIQUID ORAL EVERY 8 HOURS
Qty: 20 TABLET | Refills: 0 | Status: SHIPPED | OUTPATIENT
Start: 2021-06-21 | End: 2021-06-21 | Stop reason: SDUPTHER

## 2021-06-21 RX ORDER — FLUTICASONE PROPIONATE 50 MCG
1 SPRAY, SUSPENSION (ML) NASAL 2 TIMES DAILY PRN
Qty: 15 G | Refills: 0 | Status: SHIPPED | OUTPATIENT
Start: 2021-06-21 | End: 2021-06-21 | Stop reason: SDUPTHER

## 2021-06-21 RX ORDER — GUAIFENESIN/DEXTROMETHORPHAN 100-10MG/5
5 SYRUP ORAL 4 TIMES DAILY PRN
Qty: 120 ML | Refills: 0 | Status: SHIPPED | OUTPATIENT
Start: 2021-06-21 | End: 2021-06-21 | Stop reason: SDUPTHER

## 2021-06-21 RX ORDER — GUAIFENESIN/DEXTROMETHORPHAN 100-10MG/5
5 SYRUP ORAL 4 TIMES DAILY PRN
Qty: 120 ML | Refills: 0 | Status: SHIPPED | OUTPATIENT
Start: 2021-06-21 | End: 2021-07-01

## 2021-06-21 RX ORDER — FLUTICASONE PROPIONATE 50 MCG
1 SPRAY, SUSPENSION (ML) NASAL 2 TIMES DAILY PRN
Qty: 15 G | Refills: 0 | OUTPATIENT
Start: 2021-06-21 | End: 2021-07-26

## 2021-07-26 ENCOUNTER — HOSPITAL ENCOUNTER (EMERGENCY)
Facility: HOSPITAL | Age: 31
Discharge: HOME | End: 2021-07-26
Attending: EMERGENCY MEDICINE
Payer: MEDICAID

## 2021-07-26 VITALS
HEART RATE: 76 BPM | OXYGEN SATURATION: 99 % | DIASTOLIC BLOOD PRESSURE: 75 MMHG | TEMPERATURE: 98 F | SYSTOLIC BLOOD PRESSURE: 119 MMHG | RESPIRATION RATE: 18 BRPM | WEIGHT: 190 LBS | HEIGHT: 65 IN | BODY MASS INDEX: 31.65 KG/M2

## 2021-07-26 DIAGNOSIS — U07.1 COVID-19 VIRUS DETECTED: ICD-10-CM

## 2021-07-26 DIAGNOSIS — U07.1 COVID-19: Primary | ICD-10-CM

## 2021-07-26 LAB
B-HCG UR QL: NEGATIVE
CTP QC/QA: YES
CTP QC/QA: YES
SARS-COV-2 RDRP RESP QL NAA+PROBE: POSITIVE

## 2021-07-26 PROCEDURE — U0002 COVID-19 LAB TEST NON-CDC: HCPCS | Performed by: PHYSICIAN ASSISTANT

## 2021-07-26 PROCEDURE — 99284 EMERGENCY DEPT VISIT MOD MDM: CPT | Mod: 25

## 2021-07-26 PROCEDURE — 81025 URINE PREGNANCY TEST: CPT | Performed by: PHYSICIAN ASSISTANT

## 2021-07-26 RX ORDER — DEXTROMETHORPHAN HYDROBROMIDE, GUAIFENESIN 5; 100 MG/5ML; MG/5ML
650 LIQUID ORAL 3 TIMES DAILY PRN
Qty: 20 TABLET | Refills: 0 | Status: SHIPPED | OUTPATIENT
Start: 2021-07-26 | End: 2021-08-17

## 2021-07-26 RX ORDER — FLUTICASONE PROPIONATE 50 MCG
2 SPRAY, SUSPENSION (ML) NASAL DAILY PRN
Qty: 15 G | Refills: 0 | Status: ON HOLD | OUTPATIENT
Start: 2021-07-26 | End: 2021-08-06 | Stop reason: HOSPADM

## 2021-07-26 RX ORDER — GUAIFENESIN/DEXTROMETHORPHAN 100-10MG/5
10 SYRUP ORAL 4 TIMES DAILY PRN
Qty: 120 ML | Refills: 0 | Status: ON HOLD | OUTPATIENT
Start: 2021-07-26 | End: 2021-08-06 | Stop reason: HOSPADM

## 2021-07-26 RX ORDER — IBUPROFEN 600 MG/1
600 TABLET ORAL EVERY 6 HOURS PRN
Qty: 20 TABLET | Refills: 0 | Status: ON HOLD | OUTPATIENT
Start: 2021-07-26 | End: 2021-08-06 | Stop reason: HOSPADM

## 2021-07-26 RX ORDER — ALBUTEROL SULFATE 90 UG/1
2 AEROSOL, METERED RESPIRATORY (INHALATION) EVERY 4 HOURS PRN
Qty: 8 G | Refills: 0 | Status: ON HOLD | OUTPATIENT
Start: 2021-07-26 | End: 2021-08-06 | Stop reason: HOSPADM

## 2021-07-26 RX ORDER — FAMOTIDINE 20 MG/1
20 TABLET, FILM COATED ORAL 2 TIMES DAILY
Qty: 60 TABLET | Refills: 0 | Status: SHIPPED | OUTPATIENT
Start: 2021-07-26 | End: 2021-08-17

## 2021-07-28 ENCOUNTER — NURSE TRIAGE (OUTPATIENT)
Dept: ADMINISTRATIVE | Facility: CLINIC | Age: 31
End: 2021-07-28

## 2021-07-29 ENCOUNTER — NURSE TRIAGE (OUTPATIENT)
Dept: ADMINISTRATIVE | Facility: CLINIC | Age: 31
End: 2021-07-29

## 2021-08-01 ENCOUNTER — NURSE TRIAGE (OUTPATIENT)
Dept: ADMINISTRATIVE | Facility: CLINIC | Age: 31
End: 2021-08-01

## 2021-08-02 ENCOUNTER — PATIENT MESSAGE (OUTPATIENT)
Dept: ADMINISTRATIVE | Facility: OTHER | Age: 31
End: 2021-08-02

## 2021-08-02 ENCOUNTER — HOSPITAL ENCOUNTER (EMERGENCY)
Facility: HOSPITAL | Age: 31
Discharge: HOME | End: 2021-08-02
Attending: EMERGENCY MEDICINE
Payer: MEDICAID

## 2021-08-02 ENCOUNTER — NURSE TRIAGE (OUTPATIENT)
Dept: ADMINISTRATIVE | Facility: CLINIC | Age: 31
End: 2021-08-02

## 2021-08-02 ENCOUNTER — TELEPHONE (OUTPATIENT)
Dept: HOME HEALTH SERVICES | Facility: CLINIC | Age: 31
End: 2021-08-02

## 2021-08-02 ENCOUNTER — PATIENT MESSAGE (OUTPATIENT)
Dept: ADMINISTRATIVE | Facility: CLINIC | Age: 31
End: 2021-08-02

## 2021-08-02 VITALS
TEMPERATURE: 98 F | OXYGEN SATURATION: 99 % | HEART RATE: 100 BPM | BODY MASS INDEX: 31.65 KG/M2 | RESPIRATION RATE: 20 BRPM | HEIGHT: 65 IN | SYSTOLIC BLOOD PRESSURE: 106 MMHG | DIASTOLIC BLOOD PRESSURE: 64 MMHG | WEIGHT: 190 LBS

## 2021-08-02 DIAGNOSIS — R06.02 SHORTNESS OF BREATH: Primary | ICD-10-CM

## 2021-08-02 DIAGNOSIS — J18.9 PNEUMONIA OF BOTH LOWER LOBES DUE TO INFECTIOUS ORGANISM: ICD-10-CM

## 2021-08-02 DIAGNOSIS — U07.1 COVID-19: ICD-10-CM

## 2021-08-02 PROCEDURE — 99284 EMERGENCY DEPT VISIT MOD MDM: CPT | Mod: 25

## 2021-08-02 PROCEDURE — 63600175 PHARM REV CODE 636 W HCPCS: Performed by: PHYSICIAN ASSISTANT

## 2021-08-02 PROCEDURE — 96372 THER/PROPH/DIAG INJ SC/IM: CPT

## 2021-08-02 PROCEDURE — 25000003 PHARM REV CODE 250: Performed by: PHYSICIAN ASSISTANT

## 2021-08-02 RX ORDER — PROMETHAZINE HYDROCHLORIDE AND DEXTROMETHORPHAN HYDROBROMIDE 6.25; 15 MG/5ML; MG/5ML
5 SYRUP ORAL EVERY 4 HOURS PRN
Qty: 100 ML | Refills: 0 | Status: ON HOLD | OUTPATIENT
Start: 2021-08-02 | End: 2021-08-06 | Stop reason: HOSPADM

## 2021-08-02 RX ORDER — ACETAMINOPHEN 500 MG
500 TABLET ORAL
Status: COMPLETED | OUTPATIENT
Start: 2021-08-02 | End: 2021-08-02

## 2021-08-02 RX ORDER — DEXAMETHASONE SODIUM PHOSPHATE 4 MG/ML
6 INJECTION, SOLUTION INTRA-ARTICULAR; INTRALESIONAL; INTRAMUSCULAR; INTRAVENOUS; SOFT TISSUE
Status: COMPLETED | OUTPATIENT
Start: 2021-08-02 | End: 2021-08-02

## 2021-08-02 RX ORDER — DOXYCYCLINE 100 MG/1
100 CAPSULE ORAL EVERY 12 HOURS
Qty: 14 CAPSULE | Refills: 0 | Status: ON HOLD | OUTPATIENT
Start: 2021-08-02 | End: 2021-08-06 | Stop reason: HOSPADM

## 2021-08-02 RX ADMIN — ACETAMINOPHEN 500 MG: 500 TABLET, FILM COATED ORAL at 04:08

## 2021-08-02 RX ADMIN — DEXAMETHASONE SODIUM PHOSPHATE 6 MG: 4 INJECTION INTRA-ARTICULAR; INTRALESIONAL; INTRAMUSCULAR; INTRAVENOUS; SOFT TISSUE at 04:08

## 2021-08-03 ENCOUNTER — PATIENT MESSAGE (OUTPATIENT)
Dept: ADMINISTRATIVE | Facility: OTHER | Age: 31
End: 2021-08-03

## 2021-08-03 ENCOUNTER — NURSE TRIAGE (OUTPATIENT)
Dept: ADMINISTRATIVE | Facility: CLINIC | Age: 31
End: 2021-08-03

## 2021-08-04 ENCOUNTER — PATIENT MESSAGE (OUTPATIENT)
Dept: ADMINISTRATIVE | Facility: CLINIC | Age: 31
End: 2021-08-04

## 2021-08-04 ENCOUNTER — INFUSION (OUTPATIENT)
Dept: INFECTIOUS DISEASES | Facility: HOSPITAL | Age: 31
DRG: 177 | End: 2021-08-04
Attending: PHYSICIAN ASSISTANT
Payer: MEDICAID

## 2021-08-04 ENCOUNTER — HOSPITAL ENCOUNTER (INPATIENT)
Facility: HOSPITAL | Age: 31
LOS: 1 days | DRG: 177 | End: 2021-08-05
Attending: STUDENT IN AN ORGANIZED HEALTH CARE EDUCATION/TRAINING PROGRAM | Admitting: HOSPITALIST
Payer: MEDICAID

## 2021-08-04 ENCOUNTER — PATIENT MESSAGE (OUTPATIENT)
Dept: ADMINISTRATIVE | Facility: OTHER | Age: 31
End: 2021-08-04

## 2021-08-04 VITALS
DIASTOLIC BLOOD PRESSURE: 66 MMHG | TEMPERATURE: 98 F | SYSTOLIC BLOOD PRESSURE: 113 MMHG | BODY MASS INDEX: 32.99 KG/M2 | RESPIRATION RATE: 23 BRPM | WEIGHT: 198 LBS | OXYGEN SATURATION: 92 % | HEART RATE: 69 BPM | HEIGHT: 65 IN

## 2021-08-04 DIAGNOSIS — J12.82 PNEUMONIA DUE TO COVID-19 VIRUS: ICD-10-CM

## 2021-08-04 DIAGNOSIS — R09.02 HYPOXIA: Primary | ICD-10-CM

## 2021-08-04 DIAGNOSIS — U07.1 COVID-19: Primary | ICD-10-CM

## 2021-08-04 DIAGNOSIS — U07.1 PNEUMONIA DUE TO COVID-19 VIRUS: ICD-10-CM

## 2021-08-04 DIAGNOSIS — R06.02 SHORTNESS OF BREATH: ICD-10-CM

## 2021-08-04 LAB
ALBUMIN SERPL BCP-MCNC: 3.2 G/DL (ref 3.5–5.2)
ALP SERPL-CCNC: 57 U/L (ref 55–135)
ALT SERPL W/O P-5'-P-CCNC: 88 U/L (ref 10–44)
ANION GAP SERPL CALC-SCNC: 9 MMOL/L (ref 8–16)
APTT BLDCRRT: 26.6 SEC (ref 21–32)
AST SERPL-CCNC: 47 U/L (ref 10–40)
B-HCG UR QL: NEGATIVE
BACTERIA #/AREA URNS HPF: ABNORMAL /HPF
BASOPHILS # BLD AUTO: 0 K/UL (ref 0–0.2)
BASOPHILS NFR BLD: 0 % (ref 0–1.9)
BILIRUB SERPL-MCNC: 0.3 MG/DL (ref 0.1–1)
BILIRUB UR QL STRIP: NEGATIVE
BNP SERPL-MCNC: <10 PG/ML (ref 0–99)
BUN SERPL-MCNC: 8 MG/DL (ref 6–20)
CALCIUM SERPL-MCNC: 8.6 MG/DL (ref 8.7–10.5)
CHLORIDE SERPL-SCNC: 107 MMOL/L (ref 95–110)
CLARITY UR: ABNORMAL
CO2 SERPL-SCNC: 22 MMOL/L (ref 23–29)
COLOR UR: YELLOW
CREAT SERPL-MCNC: 0.7 MG/DL (ref 0.5–1.4)
CTP QC/QA: YES
D DIMER PPP IA.FEU-MCNC: 0.24 MG/L FEU
DIFFERENTIAL METHOD: ABNORMAL
EOSINOPHIL # BLD AUTO: 0 K/UL (ref 0–0.5)
EOSINOPHIL NFR BLD: 0 % (ref 0–8)
ERYTHROCYTE [DISTWIDTH] IN BLOOD BY AUTOMATED COUNT: 12 % (ref 11.5–14.5)
EST. GFR  (AFRICAN AMERICAN): >60 ML/MIN/1.73 M^2
EST. GFR  (NON AFRICAN AMERICAN): >60 ML/MIN/1.73 M^2
GLUCOSE SERPL-MCNC: 99 MG/DL (ref 70–110)
GLUCOSE UR QL STRIP: NEGATIVE
HCT VFR BLD AUTO: 36.2 % (ref 37–48.5)
HGB BLD-MCNC: 12.6 G/DL (ref 12–16)
HGB UR QL STRIP: ABNORMAL
IMM GRANULOCYTES # BLD AUTO: 0.02 K/UL (ref 0–0.04)
IMM GRANULOCYTES NFR BLD AUTO: 0.5 % (ref 0–0.5)
INR PPP: 1 (ref 0.8–1.2)
KETONES UR QL STRIP: ABNORMAL
LEUKOCYTE ESTERASE UR QL STRIP: ABNORMAL
LYMPHOCYTES # BLD AUTO: 0.6 K/UL (ref 1–4.8)
LYMPHOCYTES NFR BLD: 14.8 % (ref 18–48)
MCH RBC QN AUTO: 29.5 PG (ref 27–31)
MCHC RBC AUTO-ENTMCNC: 34.8 G/DL (ref 32–36)
MCV RBC AUTO: 85 FL (ref 82–98)
MICROSCOPIC COMMENT: ABNORMAL
MONOCYTES # BLD AUTO: 0.3 K/UL (ref 0.3–1)
MONOCYTES NFR BLD: 6.9 % (ref 4–15)
NEUTROPHILS # BLD AUTO: 3.1 K/UL (ref 1.8–7.7)
NEUTROPHILS NFR BLD: 77.8 % (ref 38–73)
NITRITE UR QL STRIP: NEGATIVE
NRBC BLD-RTO: 0 /100 WBC
PH UR STRIP: 7 [PH] (ref 5–8)
PLATELET # BLD AUTO: 181 K/UL (ref 150–450)
PMV BLD AUTO: 10.1 FL (ref 9.2–12.9)
POTASSIUM SERPL-SCNC: 3.7 MMOL/L (ref 3.5–5.1)
PROT SERPL-MCNC: 7.1 G/DL (ref 6–8.4)
PROT UR QL STRIP: NEGATIVE
PROTHROMBIN TIME: 10.3 SEC (ref 9–12.5)
RBC # BLD AUTO: 4.27 M/UL (ref 4–5.4)
RBC #/AREA URNS HPF: 11 /HPF (ref 0–4)
SODIUM SERPL-SCNC: 138 MMOL/L (ref 136–145)
SP GR UR STRIP: 1.01 (ref 1–1.03)
SQUAMOUS #/AREA URNS HPF: 9 /HPF
TROPONIN I SERPL DL<=0.01 NG/ML-MCNC: 0.01 NG/ML (ref 0–0.03)
URN SPEC COLLECT METH UR: ABNORMAL
UROBILINOGEN UR STRIP-ACNC: NEGATIVE EU/DL
WBC # BLD AUTO: 3.92 K/UL (ref 3.9–12.7)
WBC #/AREA URNS HPF: 10 /HPF (ref 0–5)

## 2021-08-04 PROCEDURE — 85379 FIBRIN DEGRADATION QUANT: CPT | Performed by: STUDENT IN AN ORGANIZED HEALTH CARE EDUCATION/TRAINING PROGRAM

## 2021-08-04 PROCEDURE — 82306 VITAMIN D 25 HYDROXY: CPT | Performed by: STUDENT IN AN ORGANIZED HEALTH CARE EDUCATION/TRAINING PROGRAM

## 2021-08-04 PROCEDURE — 94640 AIRWAY INHALATION TREATMENT: CPT

## 2021-08-04 PROCEDURE — 63600175 PHARM REV CODE 636 W HCPCS: Performed by: INTERNAL MEDICINE

## 2021-08-04 PROCEDURE — 85610 PROTHROMBIN TIME: CPT | Performed by: STUDENT IN AN ORGANIZED HEALTH CARE EDUCATION/TRAINING PROGRAM

## 2021-08-04 PROCEDURE — 25000242 PHARM REV CODE 250 ALT 637 W/ HCPCS: Performed by: INTERNAL MEDICINE

## 2021-08-04 PROCEDURE — 96366 THER/PROPH/DIAG IV INF ADDON: CPT

## 2021-08-04 PROCEDURE — 85025 COMPLETE CBC W/AUTO DIFF WBC: CPT | Performed by: STUDENT IN AN ORGANIZED HEALTH CARE EDUCATION/TRAINING PROGRAM

## 2021-08-04 PROCEDURE — 25000003 PHARM REV CODE 250: Performed by: INTERNAL MEDICINE

## 2021-08-04 PROCEDURE — 96361 HYDRATE IV INFUSION ADD-ON: CPT

## 2021-08-04 PROCEDURE — 25000003 PHARM REV CODE 250: Performed by: STUDENT IN AN ORGANIZED HEALTH CARE EDUCATION/TRAINING PROGRAM

## 2021-08-04 PROCEDURE — 25000003 PHARM REV CODE 250: Performed by: EMERGENCY MEDICINE

## 2021-08-04 PROCEDURE — 63600175 PHARM REV CODE 636 W HCPCS: Performed by: EMERGENCY MEDICINE

## 2021-08-04 PROCEDURE — 12000002 HC ACUTE/MED SURGE SEMI-PRIVATE ROOM

## 2021-08-04 PROCEDURE — 85730 THROMBOPLASTIN TIME PARTIAL: CPT | Performed by: STUDENT IN AN ORGANIZED HEALTH CARE EDUCATION/TRAINING PROGRAM

## 2021-08-04 PROCEDURE — 27100098 HC SPACER

## 2021-08-04 PROCEDURE — 96365 THER/PROPH/DIAG IV INF INIT: CPT

## 2021-08-04 PROCEDURE — 99291 CRITICAL CARE FIRST HOUR: CPT | Mod: 25

## 2021-08-04 PROCEDURE — 96372 THER/PROPH/DIAG INJ SC/IM: CPT | Mod: 59

## 2021-08-04 PROCEDURE — 81025 URINE PREGNANCY TEST: CPT | Performed by: STUDENT IN AN ORGANIZED HEALTH CARE EDUCATION/TRAINING PROGRAM

## 2021-08-04 PROCEDURE — 93010 ELECTROCARDIOGRAM REPORT: CPT | Mod: ,,, | Performed by: INTERNAL MEDICINE

## 2021-08-04 PROCEDURE — 83880 ASSAY OF NATRIURETIC PEPTIDE: CPT | Performed by: STUDENT IN AN ORGANIZED HEALTH CARE EDUCATION/TRAINING PROGRAM

## 2021-08-04 PROCEDURE — 80053 COMPREHEN METABOLIC PANEL: CPT | Performed by: STUDENT IN AN ORGANIZED HEALTH CARE EDUCATION/TRAINING PROGRAM

## 2021-08-04 PROCEDURE — 93010 EKG 12-LEAD: ICD-10-PCS | Mod: ,,, | Performed by: INTERNAL MEDICINE

## 2021-08-04 PROCEDURE — M0243 CASIRIVI AND IMDEVI INFUSION: HCPCS | Performed by: EMERGENCY MEDICINE

## 2021-08-04 PROCEDURE — 84484 ASSAY OF TROPONIN QUANT: CPT | Performed by: STUDENT IN AN ORGANIZED HEALTH CARE EDUCATION/TRAINING PROGRAM

## 2021-08-04 PROCEDURE — 93005 ELECTROCARDIOGRAM TRACING: CPT

## 2021-08-04 PROCEDURE — 94761 N-INVAS EAR/PLS OXIMETRY MLT: CPT

## 2021-08-04 PROCEDURE — 81000 URINALYSIS NONAUTO W/SCOPE: CPT | Performed by: STUDENT IN AN ORGANIZED HEALTH CARE EDUCATION/TRAINING PROGRAM

## 2021-08-04 RX ORDER — SODIUM CHLORIDE 0.9 % (FLUSH) 0.9 %
10 SYRINGE (ML) INJECTION
Status: DISCONTINUED | OUTPATIENT
Start: 2021-08-04 | End: 2021-08-06 | Stop reason: HOSPADM

## 2021-08-04 RX ORDER — ACETAMINOPHEN 500 MG
1000 TABLET ORAL
Status: COMPLETED | OUTPATIENT
Start: 2021-08-04 | End: 2021-08-04

## 2021-08-04 RX ORDER — EPINEPHRINE 0.3 MG/.3ML
0.3 INJECTION SUBCUTANEOUS
Status: DISCONTINUED | OUTPATIENT
Start: 2021-08-04 | End: 2022-03-30 | Stop reason: ALTCHOICE

## 2021-08-04 RX ORDER — ALBUTEROL SULFATE 90 UG/1
2 AEROSOL, METERED RESPIRATORY (INHALATION) EVERY 6 HOURS
Status: DISCONTINUED | OUTPATIENT
Start: 2021-08-04 | End: 2021-08-05

## 2021-08-04 RX ORDER — ALBUTEROL SULFATE 90 UG/1
2 AEROSOL, METERED RESPIRATORY (INHALATION)
Status: DISCONTINUED | OUTPATIENT
Start: 2021-08-04 | End: 2021-08-05 | Stop reason: HOSPADM

## 2021-08-04 RX ORDER — DIPHENHYDRAMINE HYDROCHLORIDE 50 MG/ML
25 INJECTION INTRAMUSCULAR; INTRAVENOUS ONCE AS NEEDED
Status: DISCONTINUED | OUTPATIENT
Start: 2021-08-04 | End: 2021-08-06 | Stop reason: HOSPADM

## 2021-08-04 RX ORDER — ACETAMINOPHEN 325 MG/1
650 TABLET ORAL ONCE AS NEEDED
Status: DISCONTINUED | OUTPATIENT
Start: 2021-08-04 | End: 2021-08-06 | Stop reason: HOSPADM

## 2021-08-04 RX ORDER — ENOXAPARIN SODIUM 100 MG/ML
1 INJECTION SUBCUTANEOUS
Status: DISCONTINUED | OUTPATIENT
Start: 2021-08-04 | End: 2021-08-05 | Stop reason: HOSPADM

## 2021-08-04 RX ORDER — ONDANSETRON 4 MG/1
4 TABLET, ORALLY DISINTEGRATING ORAL ONCE AS NEEDED
Status: DISCONTINUED | OUTPATIENT
Start: 2021-08-04 | End: 2021-08-06 | Stop reason: HOSPADM

## 2021-08-04 RX ORDER — ASCORBIC ACID 500 MG
500 TABLET ORAL 2 TIMES DAILY
Status: DISCONTINUED | OUTPATIENT
Start: 2021-08-04 | End: 2021-08-05 | Stop reason: HOSPADM

## 2021-08-04 RX ORDER — SODIUM CHLORIDE 0.9 % (FLUSH) 0.9 %
10 SYRINGE (ML) INJECTION
Status: DISCONTINUED | OUTPATIENT
Start: 2021-08-04 | End: 2021-08-05 | Stop reason: HOSPADM

## 2021-08-04 RX ADMIN — ACETAMINOPHEN 1000 MG: 500 TABLET, FILM COATED ORAL at 09:08

## 2021-08-04 RX ADMIN — SODIUM CHLORIDE 1000 ML: 9 INJECTION, SOLUTION INTRAVENOUS at 06:08

## 2021-08-04 RX ADMIN — ALBUTEROL SULFATE 2 PUFF: 90 AEROSOL, METERED RESPIRATORY (INHALATION) at 08:08

## 2021-08-04 RX ADMIN — ENOXAPARIN SODIUM 90 MG: 100 INJECTION SUBCUTANEOUS at 07:08

## 2021-08-04 RX ADMIN — CASIRIVIMAB AND IMDEVIMAB 600 MG: 600; 600 INJECTION, SOLUTION, CONCENTRATE INTRAVENOUS at 01:08

## 2021-08-04 RX ADMIN — DEXAMETHASONE 6 MG: 2 TABLET ORAL at 07:08

## 2021-08-04 RX ADMIN — REMDESIVIR 200 MG: 100 INJECTION, POWDER, LYOPHILIZED, FOR SOLUTION INTRAVENOUS at 10:08

## 2021-08-04 RX ADMIN — OXYCODONE HYDROCHLORIDE AND ACETAMINOPHEN 500 MG: 500 TABLET ORAL at 09:08

## 2021-08-05 ENCOUNTER — PATIENT MESSAGE (OUTPATIENT)
Dept: ADMINISTRATIVE | Facility: CLINIC | Age: 31
End: 2021-08-05

## 2021-08-05 VITALS
RESPIRATION RATE: 18 BRPM | WEIGHT: 198 LBS | TEMPERATURE: 98 F | DIASTOLIC BLOOD PRESSURE: 67 MMHG | OXYGEN SATURATION: 98 % | SYSTOLIC BLOOD PRESSURE: 105 MMHG | HEART RATE: 60 BPM | BODY MASS INDEX: 32.99 KG/M2 | HEIGHT: 65 IN

## 2021-08-05 PROBLEM — E66.9 CLASS 1 OBESITY IN ADULT: Status: ACTIVE | Noted: 2021-08-05

## 2021-08-05 PROBLEM — N39.0 UTI (URINARY TRACT INFECTION): Status: ACTIVE | Noted: 2021-08-05

## 2021-08-05 PROBLEM — U07.1 PNEUMONIA DUE TO COVID-19 VIRUS: Status: ACTIVE | Noted: 2021-08-05

## 2021-08-05 PROBLEM — J96.01 ACUTE HYPOXEMIC RESPIRATORY FAILURE DUE TO COVID-19: Status: ACTIVE | Noted: 2021-08-05

## 2021-08-05 PROBLEM — J12.82 PNEUMONIA DUE TO COVID-19 VIRUS: Status: ACTIVE | Noted: 2021-08-05

## 2021-08-05 PROBLEM — U07.1 ACUTE HYPOXEMIC RESPIRATORY FAILURE DUE TO COVID-19: Status: ACTIVE | Noted: 2021-08-05

## 2021-08-05 PROBLEM — E66.811 CLASS 1 OBESITY IN ADULT: Status: ACTIVE | Noted: 2021-08-05

## 2021-08-05 LAB
25(OH)D3+25(OH)D2 SERPL-MCNC: 18 NG/ML (ref 30–96)
ALBUMIN SERPL BCP-MCNC: 2.9 G/DL (ref 3.5–5.2)
ALP SERPL-CCNC: 51 U/L (ref 55–135)
ALT SERPL W/O P-5'-P-CCNC: 91 U/L (ref 10–44)
ANION GAP SERPL CALC-SCNC: 7 MMOL/L (ref 8–16)
AST SERPL-CCNC: 42 U/L (ref 10–40)
BASOPHILS # BLD AUTO: 0 K/UL (ref 0–0.2)
BASOPHILS NFR BLD: 0 % (ref 0–1.9)
BILIRUB SERPL-MCNC: 0.2 MG/DL (ref 0.1–1)
BUN SERPL-MCNC: 9 MG/DL (ref 6–20)
CALCIUM SERPL-MCNC: 7.9 MG/DL (ref 8.7–10.5)
CHLORIDE SERPL-SCNC: 109 MMOL/L (ref 95–110)
CO2 SERPL-SCNC: 21 MMOL/L (ref 23–29)
CREAT SERPL-MCNC: 0.7 MG/DL (ref 0.5–1.4)
CRP SERPL-MCNC: 16.1 MG/L (ref 0–8.2)
DIFFERENTIAL METHOD: ABNORMAL
EOSINOPHIL # BLD AUTO: 0 K/UL (ref 0–0.5)
EOSINOPHIL NFR BLD: 0 % (ref 0–8)
ERYTHROCYTE [DISTWIDTH] IN BLOOD BY AUTOMATED COUNT: 12 % (ref 11.5–14.5)
EST. GFR  (AFRICAN AMERICAN): >60 ML/MIN/1.73 M^2
EST. GFR  (NON AFRICAN AMERICAN): >60 ML/MIN/1.73 M^2
GLUCOSE SERPL-MCNC: 140 MG/DL (ref 70–110)
HCT VFR BLD AUTO: 34 % (ref 37–48.5)
HGB BLD-MCNC: 11.5 G/DL (ref 12–16)
IMM GRANULOCYTES # BLD AUTO: 0.02 K/UL (ref 0–0.04)
IMM GRANULOCYTES NFR BLD AUTO: 0.6 % (ref 0–0.5)
LYMPHOCYTES # BLD AUTO: 0.7 K/UL (ref 1–4.8)
LYMPHOCYTES NFR BLD: 22.1 % (ref 18–48)
MAGNESIUM SERPL-MCNC: 2.1 MG/DL (ref 1.6–2.6)
MCH RBC QN AUTO: 29.8 PG (ref 27–31)
MCHC RBC AUTO-ENTMCNC: 33.8 G/DL (ref 32–36)
MCV RBC AUTO: 88 FL (ref 82–98)
MONOCYTES # BLD AUTO: 0.3 K/UL (ref 0.3–1)
MONOCYTES NFR BLD: 8.4 % (ref 4–15)
NEUTROPHILS # BLD AUTO: 2.2 K/UL (ref 1.8–7.7)
NEUTROPHILS NFR BLD: 68.9 % (ref 38–73)
NRBC BLD-RTO: 0 /100 WBC
PHOSPHATE SERPL-MCNC: 2.3 MG/DL (ref 2.7–4.5)
PLATELET # BLD AUTO: 197 K/UL (ref 150–450)
PMV BLD AUTO: 10.5 FL (ref 9.2–12.9)
POTASSIUM SERPL-SCNC: 3.8 MMOL/L (ref 3.5–5.1)
PROT SERPL-MCNC: 6.5 G/DL (ref 6–8.4)
RBC # BLD AUTO: 3.86 M/UL (ref 4–5.4)
SODIUM SERPL-SCNC: 137 MMOL/L (ref 136–145)
WBC # BLD AUTO: 3.21 K/UL (ref 3.9–12.7)

## 2021-08-05 PROCEDURE — 86140 C-REACTIVE PROTEIN: CPT | Performed by: INTERNAL MEDICINE

## 2021-08-05 PROCEDURE — 94761 N-INVAS EAR/PLS OXIMETRY MLT: CPT

## 2021-08-05 PROCEDURE — 27000221 HC OXYGEN, UP TO 24 HOURS

## 2021-08-05 PROCEDURE — 96366 THER/PROPH/DIAG IV INF ADDON: CPT

## 2021-08-05 PROCEDURE — 63600175 PHARM REV CODE 636 W HCPCS: Performed by: INTERNAL MEDICINE

## 2021-08-05 PROCEDURE — 25000242 PHARM REV CODE 250 ALT 637 W/ HCPCS: Performed by: INTERNAL MEDICINE

## 2021-08-05 PROCEDURE — 80053 COMPREHEN METABOLIC PANEL: CPT | Performed by: INTERNAL MEDICINE

## 2021-08-05 PROCEDURE — 94640 AIRWAY INHALATION TREATMENT: CPT

## 2021-08-05 PROCEDURE — 84100 ASSAY OF PHOSPHORUS: CPT | Performed by: INTERNAL MEDICINE

## 2021-08-05 PROCEDURE — 25000003 PHARM REV CODE 250: Performed by: PHYSICIAN ASSISTANT

## 2021-08-05 PROCEDURE — 63600175 PHARM REV CODE 636 W HCPCS: Performed by: NURSE PRACTITIONER

## 2021-08-05 PROCEDURE — 12000002 HC ACUTE/MED SURGE SEMI-PRIVATE ROOM

## 2021-08-05 PROCEDURE — 83735 ASSAY OF MAGNESIUM: CPT | Performed by: INTERNAL MEDICINE

## 2021-08-05 PROCEDURE — 96375 TX/PRO/DX INJ NEW DRUG ADDON: CPT

## 2021-08-05 PROCEDURE — 96372 THER/PROPH/DIAG INJ SC/IM: CPT

## 2021-08-05 PROCEDURE — 63600175 PHARM REV CODE 636 W HCPCS: Performed by: PHYSICIAN ASSISTANT

## 2021-08-05 PROCEDURE — 85025 COMPLETE CBC W/AUTO DIFF WBC: CPT | Performed by: INTERNAL MEDICINE

## 2021-08-05 PROCEDURE — 25000003 PHARM REV CODE 250: Performed by: INTERNAL MEDICINE

## 2021-08-05 RX ORDER — BENZONATATE 100 MG/1
100 CAPSULE ORAL 3 TIMES DAILY PRN
Status: DISCONTINUED | OUTPATIENT
Start: 2021-08-05 | End: 2021-08-05 | Stop reason: HOSPADM

## 2021-08-05 RX ORDER — ALBUTEROL SULFATE 90 UG/1
2 AEROSOL, METERED RESPIRATORY (INHALATION) 2 TIMES DAILY
Status: DISCONTINUED | OUTPATIENT
Start: 2021-08-05 | End: 2021-08-05 | Stop reason: HOSPADM

## 2021-08-05 RX ORDER — CHOLECALCIFEROL (VITAMIN D3) 25 MCG
1000 TABLET ORAL DAILY
Status: DISCONTINUED | OUTPATIENT
Start: 2021-08-05 | End: 2021-08-05 | Stop reason: HOSPADM

## 2021-08-05 RX ORDER — HYDROCODONE BITARTRATE AND ACETAMINOPHEN 5; 325 MG/1; MG/1
1 TABLET ORAL EVERY 6 HOURS PRN
Status: DISCONTINUED | OUTPATIENT
Start: 2021-08-05 | End: 2021-08-05 | Stop reason: HOSPADM

## 2021-08-05 RX ORDER — SIMETHICONE 80 MG
1 TABLET,CHEWABLE ORAL 3 TIMES DAILY PRN
Status: DISCONTINUED | OUTPATIENT
Start: 2021-08-05 | End: 2021-08-05 | Stop reason: HOSPADM

## 2021-08-05 RX ORDER — ONDANSETRON 2 MG/ML
4 INJECTION INTRAMUSCULAR; INTRAVENOUS EVERY 6 HOURS PRN
Status: DISCONTINUED | OUTPATIENT
Start: 2021-08-05 | End: 2021-08-05 | Stop reason: HOSPADM

## 2021-08-05 RX ADMIN — DEXAMETHASONE 6 MG: 2 TABLET ORAL at 10:08

## 2021-08-05 RX ADMIN — CHOLECALCIFEROL TAB 25 MCG (1000 UNIT) 1000 UNITS: 25 TAB at 02:08

## 2021-08-05 RX ADMIN — ALBUTEROL SULFATE 2 PUFF: 90 AEROSOL, METERED RESPIRATORY (INHALATION) at 08:08

## 2021-08-05 RX ADMIN — ONDANSETRON 4 MG: 2 INJECTION INTRAMUSCULAR; INTRAVENOUS at 08:08

## 2021-08-05 RX ADMIN — CEFTRIAXONE 1 G: 1 INJECTION, SOLUTION INTRAVENOUS at 05:08

## 2021-08-05 RX ADMIN — HYDROCODONE BITARTRATE AND ACETAMINOPHEN 1 TABLET: 5; 325 TABLET ORAL at 11:08

## 2021-08-05 RX ADMIN — OXYCODONE HYDROCHLORIDE AND ACETAMINOPHEN 500 MG: 500 TABLET ORAL at 10:08

## 2021-08-05 RX ADMIN — ENOXAPARIN SODIUM 90 MG: 100 INJECTION SUBCUTANEOUS at 08:08

## 2021-08-05 RX ADMIN — REMDESIVIR 100 MG: 100 INJECTION, POWDER, LYOPHILIZED, FOR SOLUTION INTRAVENOUS at 08:08

## 2021-08-05 RX ADMIN — THERA TABS 1 TABLET: TAB at 10:08

## 2021-08-05 RX ADMIN — BENZONATATE 100 MG: 100 CAPSULE ORAL at 10:08

## 2021-08-05 RX ADMIN — ALBUTEROL SULFATE 2 PUFF: 90 AEROSOL, METERED RESPIRATORY (INHALATION) at 12:08

## 2021-08-05 RX ADMIN — SIMETHICONE 80 MG: 80 TABLET, CHEWABLE ORAL at 10:08

## 2021-08-06 PROBLEM — R09.02 HYPOXIA: Status: ACTIVE | Noted: 2021-08-06

## 2021-08-06 PROBLEM — J96.01 ACUTE HYPOXEMIC RESPIRATORY FAILURE DUE TO COVID-19: Status: RESOLVED | Noted: 2021-08-05 | Resolved: 2021-08-06

## 2021-08-06 PROBLEM — U07.1 ACUTE HYPOXEMIC RESPIRATORY FAILURE DUE TO COVID-19: Status: RESOLVED | Noted: 2021-08-05 | Resolved: 2021-08-06

## 2021-08-06 PROBLEM — R09.02 HYPOXIA: Status: RESOLVED | Noted: 2021-08-04 | Resolved: 2021-08-06

## 2021-08-17 ENCOUNTER — OFFICE VISIT (OUTPATIENT)
Dept: OBSTETRICS AND GYNECOLOGY | Facility: CLINIC | Age: 31
End: 2021-08-17
Payer: MEDICAID

## 2021-08-17 VITALS
SYSTOLIC BLOOD PRESSURE: 120 MMHG | BODY MASS INDEX: 34.49 KG/M2 | WEIGHT: 207 LBS | HEIGHT: 65 IN | DIASTOLIC BLOOD PRESSURE: 62 MMHG

## 2021-08-17 DIAGNOSIS — N76.0 INFECTION OF LABIA: Primary | ICD-10-CM

## 2021-08-17 PROCEDURE — 99213 OFFICE O/P EST LOW 20 MIN: CPT | Mod: PBBFAC | Performed by: OBSTETRICS & GYNECOLOGY

## 2021-08-17 PROCEDURE — 99213 OFFICE O/P EST LOW 20 MIN: CPT | Mod: S$PBB,,, | Performed by: OBSTETRICS & GYNECOLOGY

## 2021-08-17 PROCEDURE — 99999 PR PBB SHADOW E&M-EST. PATIENT-LVL III: CPT | Mod: PBBFAC,,, | Performed by: OBSTETRICS & GYNECOLOGY

## 2021-08-17 PROCEDURE — 99999 PR PBB SHADOW E&M-EST. PATIENT-LVL III: ICD-10-PCS | Mod: PBBFAC,,, | Performed by: OBSTETRICS & GYNECOLOGY

## 2021-08-17 PROCEDURE — 99213 PR OFFICE/OUTPT VISIT, EST, LEVL III, 20-29 MIN: ICD-10-PCS | Mod: S$PBB,,, | Performed by: OBSTETRICS & GYNECOLOGY

## 2021-08-17 RX ORDER — SULFAMETHOXAZOLE AND TRIMETHOPRIM 800; 160 MG/1; MG/1
1 TABLET ORAL 2 TIMES DAILY
Qty: 10 TABLET | Refills: 0 | Status: SHIPPED | OUTPATIENT
Start: 2021-08-17 | End: 2021-08-22

## 2021-08-17 RX ORDER — TRIAMCINOLONE ACETONIDE 0.25 MG/G
CREAM TOPICAL
COMMUNITY
Start: 2021-01-26 | End: 2022-03-30

## 2021-08-17 RX ORDER — LINACLOTIDE 145 UG/1
145 CAPSULE, GELATIN COATED ORAL EVERY MORNING
COMMUNITY
Start: 2021-08-13 | End: 2022-03-30

## 2021-08-17 RX ORDER — HYDROCODONE BITARTRATE AND ACETAMINOPHEN 5; 325 MG/1; MG/1
1 TABLET ORAL
COMMUNITY
Start: 2021-07-17 | End: 2022-03-30

## 2021-08-17 RX ORDER — NYSTATIN 100000 [USP'U]/ML
SUSPENSION ORAL
COMMUNITY
Start: 2021-08-14 | End: 2022-03-30

## 2021-08-17 RX ORDER — DEXAMETHASONE 4 MG/1
TABLET ORAL
COMMUNITY
Start: 2021-01-26 | End: 2022-03-30

## 2022-01-13 ENCOUNTER — PATIENT MESSAGE (OUTPATIENT)
Dept: ADMINISTRATIVE | Facility: OTHER | Age: 32
End: 2022-01-13
Payer: MEDICAID

## 2022-01-15 ENCOUNTER — PATIENT MESSAGE (OUTPATIENT)
Dept: ADMINISTRATIVE | Facility: OTHER | Age: 32
End: 2022-01-15
Payer: MEDICAID

## 2022-01-16 ENCOUNTER — PATIENT MESSAGE (OUTPATIENT)
Dept: ADMINISTRATIVE | Facility: OTHER | Age: 32
End: 2022-01-16
Payer: MEDICAID

## 2022-01-18 ENCOUNTER — LAB VISIT (OUTPATIENT)
Dept: PRIMARY CARE CLINIC | Facility: CLINIC | Age: 32
End: 2022-01-18
Payer: MEDICAID

## 2022-01-18 DIAGNOSIS — Z20.822 CONTACT WITH AND (SUSPECTED) EXPOSURE TO COVID-19: ICD-10-CM

## 2022-01-18 LAB
CTP QC/QA: YES
SARS-COV-2 AG RESP QL IA.RAPID: POSITIVE

## 2022-01-18 PROCEDURE — 87811 SARS-COV-2 COVID19 W/OPTIC: CPT

## 2022-01-19 ENCOUNTER — INFUSION (OUTPATIENT)
Dept: INFECTIOUS DISEASES | Facility: HOSPITAL | Age: 32
End: 2022-01-19
Attending: INTERNAL MEDICINE
Payer: MEDICAID

## 2022-01-19 VITALS
BODY MASS INDEX: 35.82 KG/M2 | HEIGHT: 65 IN | WEIGHT: 215 LBS | TEMPERATURE: 99 F | SYSTOLIC BLOOD PRESSURE: 129 MMHG | OXYGEN SATURATION: 96 % | HEART RATE: 80 BPM | DIASTOLIC BLOOD PRESSURE: 64 MMHG | RESPIRATION RATE: 18 BRPM

## 2022-01-19 DIAGNOSIS — U07.1 COVID: Primary | ICD-10-CM

## 2022-01-19 DIAGNOSIS — U07.1 COVID: ICD-10-CM

## 2022-01-19 DIAGNOSIS — U07.1 COVID-19: Primary | ICD-10-CM

## 2022-01-19 PROCEDURE — 25000003 PHARM REV CODE 250: Performed by: INTERNAL MEDICINE

## 2022-01-19 PROCEDURE — 63600175 PHARM REV CODE 636 W HCPCS: Performed by: INTERNAL MEDICINE

## 2022-01-19 PROCEDURE — M0243 CASIRIVI AND IMDEVI INFUSION: HCPCS | Performed by: INTERNAL MEDICINE

## 2022-01-19 RX ORDER — ALBUTEROL SULFATE 90 UG/1
2 AEROSOL, METERED RESPIRATORY (INHALATION)
Status: DISCONTINUED | OUTPATIENT
Start: 2022-01-19 | End: 2022-03-30 | Stop reason: ALTCHOICE

## 2022-01-19 RX ORDER — DIPHENHYDRAMINE HYDROCHLORIDE 50 MG/ML
25 INJECTION INTRAMUSCULAR; INTRAVENOUS ONCE AS NEEDED
Status: DISCONTINUED | OUTPATIENT
Start: 2022-01-19 | End: 2022-03-30

## 2022-01-19 RX ORDER — EPINEPHRINE 0.3 MG/.3ML
0.3 INJECTION SUBCUTANEOUS
Status: DISCONTINUED | OUTPATIENT
Start: 2022-01-19 | End: 2022-03-30 | Stop reason: ALTCHOICE

## 2022-01-19 RX ORDER — ONDANSETRON 4 MG/1
4 TABLET, ORALLY DISINTEGRATING ORAL ONCE AS NEEDED
Status: DISCONTINUED | OUTPATIENT
Start: 2022-01-19 | End: 2022-03-30

## 2022-01-19 RX ORDER — SODIUM CHLORIDE 0.9 % (FLUSH) 0.9 %
10 SYRINGE (ML) INJECTION
Status: DISCONTINUED | OUTPATIENT
Start: 2022-01-19 | End: 2022-03-30 | Stop reason: ALTCHOICE

## 2022-01-19 RX ORDER — ACETAMINOPHEN 325 MG/1
650 TABLET ORAL ONCE AS NEEDED
Status: DISCONTINUED | OUTPATIENT
Start: 2022-01-19 | End: 2022-03-30 | Stop reason: ALTCHOICE

## 2022-01-19 RX ADMIN — CASIRIVIMAB AND IMDEVIMAB 600 MG: 600; 600 INJECTION, SOLUTION, CONCENTRATE INTRAVENOUS at 10:01

## 2022-01-19 NOTE — PROGRESS NOTES
Patient remains with no signs of complications noted. Patient received casirivimab/ imdevimab infusion with filtered tubing according to FDA recommendations and [facility] SOP without complications noted and left with mask in place. Drug fact sheet provided. Pt discharged home. Ambulatory. Remains AAox3. No distress noted. RR even and unlabored.

## 2022-01-19 NOTE — PROGRESS NOTES
Patient arrives for casirivimab/ imdevimab infusion. Ambulatory. Pt AAox3. No distress noted. RR even and unlabored.     Symptoms and onset date:  01/17/22 SOB, headache, cough, and congestion      Tested COVID + on 01/18/22

## 2022-01-24 ENCOUNTER — LAB VISIT (OUTPATIENT)
Dept: PRIMARY CARE CLINIC | Facility: CLINIC | Age: 32
End: 2022-01-24
Payer: MEDICAID

## 2022-01-24 DIAGNOSIS — Z20.822 CONTACT WITH AND (SUSPECTED) EXPOSURE TO COVID-19: ICD-10-CM

## 2022-01-24 LAB
CTP QC/QA: YES
SARS-COV-2 AG RESP QL IA.RAPID: NEGATIVE

## 2022-01-24 PROCEDURE — 87811 SARS-COV-2 COVID19 W/OPTIC: CPT

## 2022-03-07 ENCOUNTER — TELEPHONE (OUTPATIENT)
Dept: OBSTETRICS AND GYNECOLOGY | Facility: CLINIC | Age: 32
End: 2022-03-07
Payer: MEDICAID

## 2022-03-07 NOTE — TELEPHONE ENCOUNTER
Patient is aware that her nexplanon was inserted on 2/27/2019. She is aware that she has an annual sched for 3/2022, she will discuss with Dr Johnson at that visit her birthcontrol options          ----- Message from Corina Zhong sent at 3/7/2022 12:32 PM CST -----  Type: Patient Call Back    Who called: self     What is the request in detail: Would like to speak with a nurse about her birth control options.     Can the clinic reply by [facility]? No     Would the patient rather a call back or a response via [facility]? [facility]    Best call back number: [phone]

## 2022-03-30 ENCOUNTER — OFFICE VISIT (OUTPATIENT)
Dept: OBSTETRICS AND GYNECOLOGY | Facility: CLINIC | Age: 32
End: 2022-03-30
Payer: MEDICAID

## 2022-03-30 VITALS
DIASTOLIC BLOOD PRESSURE: 64 MMHG | BODY MASS INDEX: 36.73 KG/M2 | HEIGHT: 65 IN | SYSTOLIC BLOOD PRESSURE: 104 MMHG | WEIGHT: 220.44 LBS

## 2022-03-30 DIAGNOSIS — Z01.419 WELL WOMAN EXAM WITH ROUTINE GYNECOLOGICAL EXAM: Primary | ICD-10-CM

## 2022-03-30 DIAGNOSIS — R87.810 PAP SMEAR OF CERVIX SHOWS HIGH RISK HPV PRESENT: ICD-10-CM

## 2022-03-30 PROCEDURE — 87624 HPV HI-RISK TYP POOLED RSLT: CPT | Performed by: OBSTETRICS & GYNECOLOGY

## 2022-03-30 PROCEDURE — 3074F PR MOST RECENT SYSTOLIC BLOOD PRESSURE < 130 MM HG: ICD-10-PCS | Mod: CPTII,,, | Performed by: OBSTETRICS & GYNECOLOGY

## 2022-03-30 PROCEDURE — 3074F SYST BP LT 130 MM HG: CPT | Mod: CPTII,,, | Performed by: OBSTETRICS & GYNECOLOGY

## 2022-03-30 PROCEDURE — 3078F PR MOST RECENT DIASTOLIC BLOOD PRESSURE < 80 MM HG: ICD-10-PCS | Mod: CPTII,,, | Performed by: OBSTETRICS & GYNECOLOGY

## 2022-03-30 PROCEDURE — 87625 HPV TYPES 16 & 18 ONLY: CPT | Performed by: OBSTETRICS & GYNECOLOGY

## 2022-03-30 PROCEDURE — 1160F RVW MEDS BY RX/DR IN RCRD: CPT | Mod: CPTII,,, | Performed by: OBSTETRICS & GYNECOLOGY

## 2022-03-30 PROCEDURE — 99999 PR PBB SHADOW E&M-EST. PATIENT-LVL III: CPT | Mod: PBBFAC,,, | Performed by: OBSTETRICS & GYNECOLOGY

## 2022-03-30 PROCEDURE — 3008F BODY MASS INDEX DOCD: CPT | Mod: CPTII,,, | Performed by: OBSTETRICS & GYNECOLOGY

## 2022-03-30 PROCEDURE — 99213 OFFICE O/P EST LOW 20 MIN: CPT | Mod: PBBFAC | Performed by: OBSTETRICS & GYNECOLOGY

## 2022-03-30 PROCEDURE — 3078F DIAST BP <80 MM HG: CPT | Mod: CPTII,,, | Performed by: OBSTETRICS & GYNECOLOGY

## 2022-03-30 PROCEDURE — 3008F PR BODY MASS INDEX (BMI) DOCUMENTED: ICD-10-PCS | Mod: CPTII,,, | Performed by: OBSTETRICS & GYNECOLOGY

## 2022-03-30 PROCEDURE — 1160F PR REVIEW ALL MEDS BY PRESCRIBER/CLIN PHARMACIST DOCUMENTED: ICD-10-PCS | Mod: CPTII,,, | Performed by: OBSTETRICS & GYNECOLOGY

## 2022-03-30 PROCEDURE — 99395 PR PREVENTIVE VISIT,EST,18-39: ICD-10-PCS | Mod: S$PBB,,, | Performed by: OBSTETRICS & GYNECOLOGY

## 2022-03-30 PROCEDURE — 1159F MED LIST DOCD IN RCRD: CPT | Mod: CPTII,,, | Performed by: OBSTETRICS & GYNECOLOGY

## 2022-03-30 PROCEDURE — 99395 PREV VISIT EST AGE 18-39: CPT | Mod: S$PBB,,, | Performed by: OBSTETRICS & GYNECOLOGY

## 2022-03-30 PROCEDURE — 87624 HPV HI-RISK TYP POOLED RSLT: CPT | Mod: 91 | Performed by: OBSTETRICS & GYNECOLOGY

## 2022-03-30 PROCEDURE — 88175 CYTOPATH C/V AUTO FLUID REDO: CPT | Performed by: OBSTETRICS & GYNECOLOGY

## 2022-03-30 PROCEDURE — 99999 PR PBB SHADOW E&M-EST. PATIENT-LVL III: ICD-10-PCS | Mod: PBBFAC,,, | Performed by: OBSTETRICS & GYNECOLOGY

## 2022-03-30 PROCEDURE — 1159F PR MEDICATION LIST DOCUMENTED IN MEDICAL RECORD: ICD-10-PCS | Mod: CPTII,,, | Performed by: OBSTETRICS & GYNECOLOGY

## 2022-03-30 RX ORDER — CARIPRAZINE 1.5 MG/1
1 CAPSULE, GELATIN COATED ORAL DAILY
COMMUNITY
Start: 2022-02-23 | End: 2023-02-27

## 2022-03-30 RX ORDER — HYDROCODONE BITARTRATE AND ACETAMINOPHEN 7.5; 325 MG/1; MG/1
1 TABLET ORAL
COMMUNITY
Start: 2022-03-25

## 2022-03-30 RX ORDER — NAPROXEN 500 MG/1
500 TABLET ORAL 2 TIMES DAILY
COMMUNITY
Start: 2022-03-11 | End: 2022-09-18

## 2022-03-30 RX ORDER — CYCLOBENZAPRINE HCL 5 MG
5-10 TABLET ORAL NIGHTLY
COMMUNITY
Start: 2022-03-11 | End: 2022-09-20
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
## 2022-04-05 ENCOUNTER — PROCEDURE VISIT (OUTPATIENT)
Dept: OBSTETRICS AND GYNECOLOGY | Facility: CLINIC | Age: 32
End: 2022-04-05
Payer: MEDICAID

## 2022-04-05 VITALS
BODY MASS INDEX: 36.73 KG/M2 | SYSTOLIC BLOOD PRESSURE: 106 MMHG | DIASTOLIC BLOOD PRESSURE: 68 MMHG | HEIGHT: 65 IN | WEIGHT: 220.44 LBS

## 2022-04-05 DIAGNOSIS — Z30.46 ENCOUNTER FOR REMOVAL AND REINSERTION OF NEXPLANON: Primary | ICD-10-CM

## 2022-04-05 PROBLEM — L03.012 CELLULITIS OF LEFT THUMB: Status: RESOLVED | Noted: 2017-11-24 | Resolved: 2022-04-05

## 2022-04-05 PROBLEM — E87.6 HYPOKALEMIA: Status: RESOLVED | Noted: 2018-09-11 | Resolved: 2022-04-05

## 2022-04-05 PROBLEM — U07.1 PNEUMONIA DUE TO COVID-19 VIRUS: Status: RESOLVED | Noted: 2021-08-05 | Resolved: 2022-04-05

## 2022-04-05 PROBLEM — R31.0 GROSS HEMATURIA: Status: RESOLVED | Noted: 2018-12-16 | Resolved: 2022-04-05

## 2022-04-05 PROBLEM — J12.82 PNEUMONIA DUE TO COVID-19 VIRUS: Status: RESOLVED | Noted: 2021-08-05 | Resolved: 2022-04-05

## 2022-04-05 PROBLEM — N39.0 UTI (URINARY TRACT INFECTION): Status: RESOLVED | Noted: 2021-08-05 | Resolved: 2022-04-05

## 2022-04-05 PROBLEM — R09.02 HYPOXIA: Status: RESOLVED | Noted: 2021-08-06 | Resolved: 2022-04-05

## 2022-04-05 LAB
B-HCG UR QL: NEGATIVE
CTP QC/QA: YES

## 2022-04-05 PROCEDURE — 99499 UNLISTED E&M SERVICE: CPT | Mod: S$PBB,,, | Performed by: OBSTETRICS & GYNECOLOGY

## 2022-04-05 PROCEDURE — 81025 URINE PREGNANCY TEST: CPT | Mod: PBBFAC | Performed by: OBSTETRICS & GYNECOLOGY

## 2022-04-05 PROCEDURE — 99499 NO LOS: ICD-10-PCS | Mod: S$PBB,,, | Performed by: OBSTETRICS & GYNECOLOGY

## 2022-04-05 PROCEDURE — 11981 INSERTION DRUG DLVR IMPLANT: CPT | Mod: PBBFAC | Performed by: OBSTETRICS & GYNECOLOGY

## 2022-04-05 PROCEDURE — 11983 REMOVAL OF NEXPLANON DEVICE: ICD-10-PCS | Mod: S$PBB,,, | Performed by: OBSTETRICS & GYNECOLOGY

## 2022-04-05 PROCEDURE — 11983 REMOVE/INSERT DRUG IMPLANT: CPT | Mod: PBBFAC | Performed by: OBSTETRICS & GYNECOLOGY

## 2022-04-05 RX ADMIN — ETONOGESTREL 68 MG: 68 IMPLANT SUBCUTANEOUS at 09:04

## 2022-04-05 NOTE — PROCEDURES
Insertion of Nexplanon Device    Date/Time: 4/5/2022 9:45 AM  Performed by: Aman Johnson MD  Authorized by: Aman Johnson MD     Consent obtained:  Written  Consent given by:  Patient  Patient questions answered: yes    Patient agrees, verbalizes understanding, and wants to proceed: yes    Educational handouts given: yes    Instructions and paperwork completed: yes    Pre-procedure timeout performed: yes    Prepped with: alcohol 70%    Local anesthetic:  Lidocaine with epinephrine  The site was cleaned and prepped in a sterile fashion: yes    Small stab incision was made in arm: no    Left/right:  Left   68 mg etonogestrel 68 mg  Preloaded Implanon trocar was placed subdermally: yes    Visualization of implant was obtained: yes    Nexplanon was inserted and trocar removed: yes    Visualization of notch in stilette and palpitation of device: yes    Palpitation confirms placement by provider and patient: yes    Site was closed with steri-strips and pressure bandage applied: yes          NEXPLANON REMOVAL and REINSERTION- PROCEDURE NOTES:    Patient was appropriately counseled regarding risks and benefits of Nexplanon.  All other contraceptive options discussed previously.  Patient still wished to have Nexplanon placed.  All questions were answered.  Consents for Nexplanon signed.  Patient was put in the supine position with her left arm flexed and her hand under her head.  The arm was examined and appropriate markings made.  Old Nexplanon palpable.    The site was cleaned with alcohol.  A solution of 2% Lidocaine with epinephrine used for local anesthesia.  #11 blade was used to make a small 0.5 cm incision above the old implantation site.  The Nexplanon was then pushed toward the incision.  Hemostats were used to search for and grasp the implant to extract it through the incision.  We had to break up some of the adhesions encapsulated the old implant with a combination of 4 x 4 and hemostats.  Once the Nexplanon was  seen, it was easily grasped and pulled through the incision.    The Nexplanon was checked and presented to the patient previously.  The lot number and expiration date were recorded.  The Nexplanon was then placed subcutaneously through the incision without any difficulty in a standard fashion.  The trocar was then removed.  The trocar sites was noted to be in good hemostasis.  2 x 2 was placed over the site and dressing applied.  Palpation of the implant was performed; the presence of Implanon in the arm confirmed.  Postprocedure pain was rated at 1/10.  Educational material was given.  Patient was instructed on palpation of the implant, and the care of the trocar site.  She is to keep it dry for the 24 hours.  She can take over-the-counter Motrin, 2-3 pills every 4-6 hours as needed for pain.    FOLLOWUP: In 2-4 weeks.    Lot# [id number]  Exp- 4/7/2024

## 2022-04-05 NOTE — PROCEDURES
Removal of Nexplanon Device    Date/Time: 4/5/2022 9:45 AM  Performed by: Aman Johnson MD  Authorized by: Aman Johnson MD   Preparation: Patient was prepped and draped in the usual sterile fashion.  Local anesthesia used: yes  Anesthesia: local infiltration    Anesthesia:  Local anesthesia used: yes  Local Anesthetic: lidocaine 2% with epinephrine  Anesthetic total: 3 mL    Sedation:  Patient sedated: no    Patient tolerance: patient tolerated the procedure well with no immediate complications

## 2022-04-08 LAB
CLINICAL INFO: NORMAL
CYTO CVX: NORMAL
CYTOLOGIST CVX/VAG CYTO: NORMAL
CYTOLOGIST CVX/VAG CYTO: NORMAL
CYTOLOGY CMNT CVX/VAG CYTO-IMP: NORMAL
CYTOLOGY PAP THIN PREP EXPLANATION: NORMAL
DATE OF PREVIOUS PAP: NORMAL
DATE PREVIOUS BX: NO
GEN CATEG CVX/VAG CYTO-IMP: NORMAL
HPV E6+E7 MRNA CVX QL NAA+PROBE: DETECTED
HPV I/H RISK 4 DNA CVX QL NAA+PROBE: NORMAL
HPV16 DNA CVX QL NAA+PROBE: NOT DETECTED
HPV18+45 E6+E7 MRNA CVX QL NAA+PROBE: NOT DETECTED
LMP START DATE: NORMAL
MICROORGANISM CVX/VAG CYTO: NORMAL
PATHOLOGIST CVX/VAG CYTO: NORMAL
SERVICE CMNT-IMP: NORMAL
SPECIMEN SOURCE CVX/VAG CYTO: NORMAL
STAT OF ADQ CVX/VAG CYTO-IMP: NORMAL

## 2022-05-21 NOTE — TELEPHONE ENCOUNTER
----- Message from Sheri Stephen sent at 11/28/2017 11:43 AM CST -----  Contact: Self   Patient says she went to Urgent Care for Cyst Pain. Please call at [phone].     36.5

## 2022-07-10 ENCOUNTER — HOSPITAL ENCOUNTER (EMERGENCY)
Facility: HOSPITAL | Age: 32
Discharge: HOME | End: 2022-07-10
Attending: EMERGENCY MEDICINE
Payer: MEDICAID

## 2022-07-10 VITALS
TEMPERATURE: 98 F | BODY MASS INDEX: 33.28 KG/M2 | DIASTOLIC BLOOD PRESSURE: 80 MMHG | HEART RATE: 78 BPM | RESPIRATION RATE: 18 BRPM | SYSTOLIC BLOOD PRESSURE: 120 MMHG | OXYGEN SATURATION: 98 % | WEIGHT: 200 LBS

## 2022-07-10 DIAGNOSIS — U07.1 COVID-19: ICD-10-CM

## 2022-07-10 DIAGNOSIS — N30.01 ACUTE CYSTITIS WITH HEMATURIA: Primary | ICD-10-CM

## 2022-07-10 DIAGNOSIS — J06.9 UPPER RESPIRATORY TRACT INFECTION, UNSPECIFIED TYPE: ICD-10-CM

## 2022-07-10 LAB
ALBUMIN SERPL-MCNC: 3.4 G/DL (ref 3.3–5.5)
ALP SERPL-CCNC: 73 U/L (ref 42–141)
B-HCG UR QL: NEGATIVE
BILIRUB SERPL-MCNC: 0.5 MG/DL (ref 0.2–1.6)
BILIRUBIN, POC UA: NEGATIVE
BLOOD, POC UA: ABNORMAL
BUN SERPL-MCNC: 7 MG/DL (ref 7–22)
CALCIUM SERPL-MCNC: 9.4 MG/DL (ref 8–10.3)
CHLORIDE SERPL-SCNC: 103 MMOL/L (ref 98–108)
CLARITY, POC UA: ABNORMAL
COLOR, POC UA: ABNORMAL
CREAT SERPL-MCNC: 0.9 MG/DL (ref 0.6–1.2)
CTP QC/QA: YES
CTP QC/QA: YES
GLUCOSE SERPL-MCNC: 105 MG/DL (ref 73–118)
GLUCOSE, POC UA: NEGATIVE
KETONES, POC UA: NEGATIVE
LEUKOCYTE EST, POC UA: ABNORMAL
NITRITE, POC UA: POSITIVE
PH UR STRIP: 6.5 [PH]
POC ALT (SGPT): 35 U/L (ref 10–47)
POC AST (SGOT): 29 U/L (ref 11–38)
POC TCO2: 27 MMOL/L (ref 18–33)
POTASSIUM BLD-SCNC: 4.2 MMOL/L (ref 3.6–5.1)
PROTEIN, POC UA: ABNORMAL
PROTEIN, POC: 7.3 G/DL (ref 6.4–8.1)
SARS-COV-2 RDRP RESP QL NAA+PROBE: NEGATIVE
SODIUM BLD-SCNC: 141 MMOL/L (ref 128–145)
SPECIFIC GRAVITY, POC UA: 1.02
UROBILINOGEN, POC UA: 0.2 E.U./DL

## 2022-07-10 PROCEDURE — 96375 TX/PRO/DX INJ NEW DRUG ADDON: CPT | Mod: ER

## 2022-07-10 PROCEDURE — 87077 CULTURE AEROBIC IDENTIFY: CPT | Performed by: EMERGENCY MEDICINE

## 2022-07-10 PROCEDURE — 99284 EMERGENCY DEPT VISIT MOD MDM: CPT | Mod: 25,ER

## 2022-07-10 PROCEDURE — 96365 THER/PROPH/DIAG IV INF INIT: CPT | Mod: ER

## 2022-07-10 PROCEDURE — 81025 URINE PREGNANCY TEST: CPT | Mod: ER | Performed by: EMERGENCY MEDICINE

## 2022-07-10 PROCEDURE — 85025 COMPLETE CBC W/AUTO DIFF WBC: CPT | Mod: ER

## 2022-07-10 PROCEDURE — 87086 URINE CULTURE/COLONY COUNT: CPT | Performed by: EMERGENCY MEDICINE

## 2022-07-10 PROCEDURE — 25000003 PHARM REV CODE 250: Mod: ER | Performed by: EMERGENCY MEDICINE

## 2022-07-10 PROCEDURE — 96361 HYDRATE IV INFUSION ADD-ON: CPT | Mod: ER

## 2022-07-10 PROCEDURE — 87186 SC STD MICRODIL/AGAR DIL: CPT | Performed by: EMERGENCY MEDICINE

## 2022-07-10 PROCEDURE — 80053 COMPREHEN METABOLIC PANEL: CPT | Mod: ER

## 2022-07-10 PROCEDURE — U0002 COVID-19 LAB TEST NON-CDC: HCPCS | Mod: ER | Performed by: EMERGENCY MEDICINE

## 2022-07-10 PROCEDURE — 63600175 PHARM REV CODE 636 W HCPCS: Mod: ER | Performed by: EMERGENCY MEDICINE

## 2022-07-10 PROCEDURE — 87088 URINE BACTERIA CULTURE: CPT | Performed by: EMERGENCY MEDICINE

## 2022-07-10 PROCEDURE — 81003 URINALYSIS AUTO W/O SCOPE: CPT | Mod: ER

## 2022-07-10 RX ORDER — SULFAMETHOXAZOLE AND TRIMETHOPRIM 800; 160 MG/1; MG/1
1 TABLET ORAL 2 TIMES DAILY
Qty: 20 TABLET | Refills: 0 | Status: SHIPPED | OUTPATIENT
Start: 2022-07-10 | End: 2022-07-20

## 2022-07-10 RX ORDER — BUTALBITAL, ACETAMINOPHEN AND CAFFEINE 50; 325; 40 MG/1; MG/1; MG/1
1 TABLET ORAL EVERY 4 HOURS PRN
Qty: 12 TABLET | Refills: 0 | Status: SHIPPED | OUTPATIENT
Start: 2022-07-10 | End: 2022-07-13

## 2022-07-10 RX ORDER — ACETAMINOPHEN 325 MG/1
650 TABLET ORAL
Status: COMPLETED | OUTPATIENT
Start: 2022-07-10 | End: 2022-07-10

## 2022-07-10 RX ORDER — CEFTRIAXONE 1 G/50ML
1 INJECTION, SOLUTION INTRAVENOUS
Status: DISCONTINUED | OUTPATIENT
Start: 2022-07-10 | End: 2022-07-10

## 2022-07-10 RX ORDER — KETOROLAC TROMETHAMINE 30 MG/ML
15 INJECTION, SOLUTION INTRAMUSCULAR; INTRAVENOUS
Status: COMPLETED | OUTPATIENT
Start: 2022-07-10 | End: 2022-07-10

## 2022-07-10 RX ADMIN — SODIUM CHLORIDE 1000 ML: 0.9 INJECTION, SOLUTION INTRAVENOUS at 09:07

## 2022-07-10 RX ADMIN — CEFTRIAXONE 1 G: 1 INJECTION, SOLUTION INTRAVENOUS at 11:07

## 2022-07-10 RX ADMIN — KETOROLAC TROMETHAMINE 15 MG: 30 INJECTION, SOLUTION INTRAMUSCULAR at 10:07

## 2022-07-10 RX ADMIN — ACETAMINOPHEN 650 MG: 325 TABLET ORAL at 09:07

## 2022-07-10 NOTE — Clinical Note
"Crystal Amos" Eulalio was seen and treated in our emergency department on 7/10/2022.  She may return to work on 07/13/2022.       If you have any questions or concerns, please don't hesitate to call.      Lsi Soto MD"

## 2022-07-10 NOTE — ED PROVIDER NOTES
Encounter Date: 7/10/2022    SCRIBE #1 NOTE: I, Kobe Wise, am scribing for, and in the presence of,  Lis Soto MD. I have scribed the following portions of the note - Other sections scribed: HPI,ROS,PE.       History     Chief Complaint   Patient presents with    Urinary Symptoms     Pt reports urinary frequency, pressure, strong smelling urine and darker in appearance. Subjective fever of 103, home tx of Naproxen and Ibuprofen with Ibuprofen being last dosed at at 0600. Pt adds, she is also concerned about Covid due to her symptoms.     Patient is a 31 year old female who presents to ED with complaints of abdominal pain and congestion onset 2 days ago and chills, 103.2 F fever, and headache onset yesterday. She also notes dysuria, strong urinary odor, and lower back pain onset 1 week ago. She took an at home COVID-19 test that was negative. Has an appointment with OB-GYN next week. Notes a kidney infection in the past. Denies any associated nausea, vomiting, vaginal discharge, sore throat, or postnasal drip. No other complaints at this time.     The history is provided by the patient. No  was used.     Review of patient's allergies indicates:  No Known Allergies  Past Medical History:   Diagnosis Date    Interstitial cystitis     Kidney stone     Migraine     UTI (urinary tract infection)      Past Surgical History:   Procedure Laterality Date    ABDOMINAL SURGERY      CYSTOSCOPY W/ RETROGRADES Bilateral 1/4/2019    Procedure: CYSTOSCOPY, WITH RETROGRADE PYELOGRAM; hydrodistention;  Surgeon: Kirsty Acevedo MD;  Location: [facility] OR;  Service: Urology;  Laterality: Bilateral;  RN PREOP 12/31/2018    EXAMINATION UNDER ANESTHESIA N/A 12/17/2018    Procedure: Exam under anesthesia;  Surgeon: Edilson Santana MD;  Location: [facility] OR;  Service: General;  Laterality: N/A;    NV EXPLORATORY OF ABDOMEN      PROCTOSIGMOIDOSCOPY N/A 12/17/2018    Procedure: PROCTOSIGMOIDOSCOPY;   Surgeon: Edilson Santana MD;  Location: [facility] OR;  Service: General;  Laterality: N/A;     Family History   Problem Relation Age of Onset    Cancer Mother     Diabetes Father      Social History     Tobacco Use    Smoking status: Current Every Day Smoker     Packs/day: 1.00    Smokeless tobacco: Never Used   Substance Use Topics    Alcohol use: Yes     Alcohol/week: 0.0 standard drinks     Comment: occassionally    Drug use: Yes     Types: Marijuana     Review of Systems   Constitutional: Positive for chills and fever.   HENT: Positive for congestion. Negative for postnasal drip and sore throat.    Respiratory: Negative for shortness of breath.    Cardiovascular: Negative for chest pain.   Gastrointestinal: Positive for abdominal pain. Negative for nausea and vomiting.   Genitourinary: Positive for dysuria. Negative for vaginal discharge.   Musculoskeletal: Positive for back pain.   Skin: Negative for rash.   Neurological: Positive for headaches. Negative for weakness.   Hematological: Does not bruise/bleed easily.   All other systems reviewed and are negative.      Physical Exam     Initial Vitals [07/10/22 0904]   BP Pulse Resp Temp SpO2   117/78 102 19 100.1 °F (37.8 °C) 99 %      MAP       --         Physical Exam    Nursing note and vitals reviewed.  Constitutional: She appears well-developed and well-nourished.   HENT:   Head: Normocephalic and atraumatic.   Very congested   Eyes: Conjunctivae and EOM are normal. Pupils are equal, round, and reactive to light. Right eye exhibits no discharge. Left eye exhibits no discharge.   Bilateral conjunctivae clear but tearing   Neck: Neck supple.   Cardiovascular: Normal heart sounds.   Pulmonary/Chest: Breath sounds normal.   Abdominal: Abdomen is soft.   Generalized very mild suprapubic abdominal discomfort   Musculoskeletal:         General: No edema. Normal range of motion.      Cervical back: Neck supple.     Neurological: She is alert and oriented to  person, place, and time.   Skin: Skin is warm.   Psychiatric: She has a normal mood and affect.         ED Course   Procedures  Labs Reviewed   POCT URINALYSIS W/O SCOPE - Abnormal; Notable for the following components:       Result Value    Blood, UA 2+ (*)     Protein, UA 1+ (*)     Nitrite, UA Positive (*)     Leukocytes, UA 1+ (*)     All other components within normal limits   CULTURE, URINE   POCT URINE PREGNANCY   POCT URINALYSIS W/O SCOPE   SARS-COV-2 RDRP GENE    Narrative:     This test utilizes isothermal nucleic acid amplification   technology to detect the SARS-CoV-2 RdRp nucleic acid segment.   The analytical sensitivity (limit of detection) is 125 genome   equivalents/mL.   A POSITIVE result implies infection with the SARS-CoV-2 virus;   the patient is presumed to be contagious.     A NEGATIVE result means that SARS-CoV-2 nucleic acids are not   present above the limit of detection. A NEGATIVE result should be   treated as presumptive. It does not rule out the possibility of   COVID-19 and should not be the sole basis for treatment decisions.   If COVID-19 is strongly suspected based on clinical and exposure   history, re-testing using an alternate molecular assay should be   considered.   This test is only for use under the Food and Drug   Administration s Emergency Use Authorization (EUA).   Commercial kits are provided by [email or web address].   Performance characteristics of the EUA have been independently   verified by [facility] Department of   Pathology and Laboratory Medicine.   _________________________________________________________________   The authorized Fact Sheet for Healthcare Providers and the authorized Fact   Sheet for Patients of the ID NOW COVID-19 are available on the FDA   website:     https://www.fda.gov/media/788043/download  https://www.fda.gov/media/208104/download          POCT CBC   POCT CMP   POCT CMP          Imaging Results          X-Ray Chest 1 View (Final  "result)  Result time 07/10/22 09:57:47    Final result by Douglas Pizarro MD (07/10/22 09:57:47)                 Impression:      No convincing acute process identified on this hypoventilatory limited single view.      Electronically signed by: Douglas Pizarro MD  Date:    07/10/2022  Time:    09:57             Narrative:    EXAMINATION:  XR CHEST 1 VIEW    CLINICAL HISTORY:  Provided history is "  COVID-19".    TECHNIQUE:  One view of the chest.    COMPARISON:  08/04/2021.    FINDINGS:  Lung volumes are relatively low, accentuating basilar markings.  Cardiomediastinal silhouette is not enlarged.  No focal consolidation.  No sizable pleural effusion.  No pneumothorax.  Overall improved bibasilar aeration when compared with the prior study.                              X-Rays:   Independently Interpreted Readings:   Chest X-Ray: Normal heart size.  No infiltrates.  No acute abnormalities.     Medications   cefTRIAXone (ROCEPHIN) 1 g/50 mL D5W IVPB (1 g Intravenous New Bag 7/10/22 1107)   sodium chloride 0.9% bolus 1,000 mL (1,000 mLs Intravenous New Bag 7/10/22 0958)   acetaminophen tablet 650 mg (650 mg Oral Given 7/10/22 0959)   ketorolac injection 15 mg (15 mg Intravenous Given 7/10/22 1054)     Medical Decision Making:   History:   Old Medical Records: I decided to obtain old medical records.  Independently Interpreted Test(s):   I have ordered and independently interpreted X-rays - see prior notes.  Clinical Tests:   Lab Tests: Ordered and Reviewed  Radiological Study: Ordered and Reviewed  ED Management:  On reexamination at 10:45 AM, I told her about her lab results. She has a headache that I will give her something for. We are waiting for results of her COVID-19 test.  Patient had dysuria for a week, and then over the past few days developed subjective fever, body aches, chills, as well as your eye symptoms.  She sounds congested and is tearing and has a headache.  Her COVID test here is negative  She does " have CVA tenderness on the right, and temperature 100.1° here.  Overall with her normal white cell count and the whole clinical picture, I believe her mildly elevated temperature as from a viral syndrome with URI symptoms, and then she also has a UTI.  However, because she does have a fever with right CVA tenderness and did have dysuria for 1 week, will give her antibiotics that could cover for pyelo.  Macrobid, though more effective for UTIs, is not indicated for pyelo.  For this young female who also has some URI symptoms, I will prescribe Bactrim for her UTI for 10 day course.  I will have her follow-up in 2 days for recheck and give her 2 days off of work.  I did tell it was possible she had COVID and at the test was a false negative           Scribe Attestation:   Scribe #1: I performed the above scribed service and the documentation accurately describes the services I performed. I attest to the accuracy of the note.                 I,Lis Soto MD  , personally performed the services described in this documentation. All medical record entries made by the scribe were at my direction and in my presence. I have reviewed the chart and agree that the record reflects my personal performance and is accurate and complete.  Clinical Impression:   Final diagnoses:  [U07.1] COVID-19 - test negative  [N30.01] Acute cystitis with hematuria (Primary)  [J06.9] Upper respiratory tract infection, unspecified type          ED Disposition Condition    Discharge Stable        ED Prescriptions     Medication Sig Dispense Start Date End Date Auth. Provider    sulfamethoxazole-trimethoprim 800-160mg (BACTRIM DS) 800-160 mg Tab Take 1 tablet by mouth 2 (two) times daily. for 10 days 20 tablet 7/10/2022 7/20/2022 Lis Soto MD    butalbital-acetaminophen-caffeine -40 mg (FIORICET, ESGIC) -40 mg per tablet Take 1 tablet by mouth every 4 (four) hours as needed for Headaches. 12 tablet 7/10/2022 7/13/2022 Lis  CHARLES Soto MD        Follow-up Information     Follow up With Specialties Details Why Contact Info    Jayme Romano NP Internal Medicine Schedule an appointment as soon as possible for a visit in 2 days  [address] [place] [address]  [phone]             Lis Soto MD  07/10/22 7952

## 2022-07-10 NOTE — Clinical Note
"Crystal Amos" Eulalio was seen and treated in our emergency department on 7/10/2022.  She may return to work on 07/13/2022.       If you have any questions or concerns, please don't hesitate to call.      Lis Soto MD"

## 2022-07-12 LAB — BACTERIA UR CULT: ABNORMAL

## 2022-09-20 PROBLEM — S46.811A TRAPEZIUS STRAIN, RIGHT, INITIAL ENCOUNTER: Status: ACTIVE | Noted: 2022-09-20

## 2022-09-20 PROBLEM — S16.1XXA CERVICAL STRAIN, ACUTE, INITIAL ENCOUNTER: Status: ACTIVE | Noted: 2022-09-20

## 2022-11-16 NOTE — TELEPHONE ENCOUNTER
----- Message from Viola Santa sent at 12/19/2018  1:16 PM CST -----  Contact: Self/ [phone]  Pt calling to make hospital follow up appt. Says Dr. Acevedo told to call and schedule. KIERRA vidal. Thank you.  
Left message for patient to phone office about upcoming appointment.  
[No falls in past year] : Patient reported no falls in the past year

## 2023-02-27 ENCOUNTER — OFFICE VISIT (OUTPATIENT)
Dept: OBSTETRICS AND GYNECOLOGY | Facility: CLINIC | Age: 33
End: 2023-02-27
Payer: MEDICAID

## 2023-02-27 VITALS
HEIGHT: 65 IN | DIASTOLIC BLOOD PRESSURE: 72 MMHG | WEIGHT: 191.81 LBS | SYSTOLIC BLOOD PRESSURE: 120 MMHG | BODY MASS INDEX: 31.96 KG/M2

## 2023-02-27 DIAGNOSIS — Z01.419 WELL WOMAN EXAM WITH ROUTINE GYNECOLOGICAL EXAM: Primary | ICD-10-CM

## 2023-02-27 DIAGNOSIS — N92.0 SPOTTING: ICD-10-CM

## 2023-02-27 PROCEDURE — 1159F PR MEDICATION LIST DOCUMENTED IN MEDICAL RECORD: ICD-10-PCS | Mod: CPTII,,, | Performed by: OBSTETRICS & GYNECOLOGY

## 2023-02-27 PROCEDURE — 99999 PR PBB SHADOW E&M-EST. PATIENT-LVL III: CPT | Mod: PBBFAC,,, | Performed by: OBSTETRICS & GYNECOLOGY

## 2023-02-27 PROCEDURE — 3078F DIAST BP <80 MM HG: CPT | Mod: CPTII,,, | Performed by: OBSTETRICS & GYNECOLOGY

## 2023-02-27 PROCEDURE — 87591 N.GONORRHOEAE DNA AMP PROB: CPT | Performed by: OBSTETRICS & GYNECOLOGY

## 2023-02-27 PROCEDURE — 3008F PR BODY MASS INDEX (BMI) DOCUMENTED: ICD-10-PCS | Mod: CPTII,,, | Performed by: OBSTETRICS & GYNECOLOGY

## 2023-02-27 PROCEDURE — 1160F PR REVIEW ALL MEDS BY PRESCRIBER/CLIN PHARMACIST DOCUMENTED: ICD-10-PCS | Mod: CPTII,,, | Performed by: OBSTETRICS & GYNECOLOGY

## 2023-02-27 PROCEDURE — 81514 NFCT DS BV&VAGINITIS DNA ALG: CPT | Performed by: OBSTETRICS & GYNECOLOGY

## 2023-02-27 PROCEDURE — 3074F SYST BP LT 130 MM HG: CPT | Mod: CPTII,,, | Performed by: OBSTETRICS & GYNECOLOGY

## 2023-02-27 PROCEDURE — 3008F BODY MASS INDEX DOCD: CPT | Mod: CPTII,,, | Performed by: OBSTETRICS & GYNECOLOGY

## 2023-02-27 PROCEDURE — 99395 PR PREVENTIVE VISIT,EST,18-39: ICD-10-PCS | Mod: S$PBB,,, | Performed by: OBSTETRICS & GYNECOLOGY

## 2023-02-27 PROCEDURE — 99395 PREV VISIT EST AGE 18-39: CPT | Mod: S$PBB,,, | Performed by: OBSTETRICS & GYNECOLOGY

## 2023-02-27 PROCEDURE — 3074F PR MOST RECENT SYSTOLIC BLOOD PRESSURE < 130 MM HG: ICD-10-PCS | Mod: CPTII,,, | Performed by: OBSTETRICS & GYNECOLOGY

## 2023-02-27 PROCEDURE — 1160F RVW MEDS BY RX/DR IN RCRD: CPT | Mod: CPTII,,, | Performed by: OBSTETRICS & GYNECOLOGY

## 2023-02-27 PROCEDURE — 99999 PR PBB SHADOW E&M-EST. PATIENT-LVL III: ICD-10-PCS | Mod: PBBFAC,,, | Performed by: OBSTETRICS & GYNECOLOGY

## 2023-02-27 PROCEDURE — 1159F MED LIST DOCD IN RCRD: CPT | Mod: CPTII,,, | Performed by: OBSTETRICS & GYNECOLOGY

## 2023-02-27 PROCEDURE — 99213 OFFICE O/P EST LOW 20 MIN: CPT | Mod: PBBFAC | Performed by: OBSTETRICS & GYNECOLOGY

## 2023-02-27 PROCEDURE — 3078F PR MOST RECENT DIASTOLIC BLOOD PRESSURE < 80 MM HG: ICD-10-PCS | Mod: CPTII,,, | Performed by: OBSTETRICS & GYNECOLOGY

## 2023-02-27 NOTE — PROGRESS NOTES
Subjective:       Patient ID: Crystal Tsai is a 32 y.o. female.    Chief Complaint:  Well Woman (Last normal pap with Negative HPV was 2022)      History of Present Illness  HPI  Annual Exam-Premenopausal  Patient presents for annual exam. The patient has no complaints today. The patient is sexually active. GYN screening history: last pap: approximate date 3/30/2022 and was normal. With neg HRHPV.  The patient wears seatbelts: yes. The patient participates in regular exercise: no. Has the patient ever been transfused or tattooed?: no/yes. The patient reports that there is not domestic violence in her life.    Moved to [place] last 2022  New partner since 2022  History of chronic pelvic pain, IC. Pain better now  History of bipolar disorder.  Not on medication  Has been on Nexplanon since 2022      GYN & OB History  Patient's last menstrual period was 2023 (exact date).   Date of Last Pap: 2020    OB History    Para Term  AB Living   2 2 2     2   SAB IAB Ectopic Multiple Live Births           2      # Outcome Date GA Lbr [place]/2nd Weight Sex Delivery Anes PTL Lv   2 Term 14 40w0d  3.317 kg (7 lb 5 oz) F   N RAJ   1 Term 07 40w0d  3.26 kg (7 lb 3 oz) F Vag-Spont EPI N RAJ      Obstetric Comments   Pap neg 2020     Past Medical History:   Diagnosis Date    Interstitial cystitis     Kidney stone     Migraine     UTI (urinary tract infection)        Past Surgical History:   Procedure Laterality Date    ABDOMINAL SURGERY      CYSTOSCOPY W/ RETROGRADES Bilateral 2019    Procedure: CYSTOSCOPY, WITH RETROGRADE PYELOGRAM; hydrodistention;  Surgeon: Kirsty Acevedo MD;  Location: [facility] OR;  Service: Urology;  Laterality: Bilateral;  RN PREOP 2018    EXAMINATION UNDER ANESTHESIA N/A 2018    Procedure: Exam under anesthesia;  Surgeon: Edilson Santana MD;  Location: [facility] OR;  Service: General;  Laterality: N/A;    KS EXPLORATORY OF  ABDOMEN      PROCTOSIGMOIDOSCOPY N/A 12/17/2018    Procedure: PROCTOSIGMOIDOSCOPY;  Surgeon: Edilson Santana MD;  Location[facility];  Service: General;  Laterality: N/A;       Family History   Problem Relation Age of Onset    Cancer Mother     Diabetes Father        Social History     Socioeconomic History    Marital status: Single   Tobacco Use    Smoking status: Every Day     Packs/day: 1.00     Types: Cigarettes    Smokeless tobacco: Never   Substance and Sexual Activity    Alcohol use: Yes     Alcohol/week: 0.0 standard drinks     Comment: occassionally    Drug use: Yes     Types: Marijuana    Sexual activity: Yes     Partners: Male     Birth control/protection: None   Social History Narrative    She has a high school diploma.     She moved to [place], [place] in 8/2022    Working as a     New partner since 5/2022.  He is a [place]       Current Outpatient Medications   Medication Sig Dispense Refill    albuterol (PROVENTIL HFA) 90 mcg/actuation inhaler Inhale 2 puffs into the lungs every 6 (six) hours as needed for Wheezing. Rescue 18 g 0    HYDROcodone-acetaminophen (NORCO) 7.5-325 mg per tablet Take 1 tablet by mouth.       Current Facility-Administered Medications   Medication Dose Route Frequency Provider Last Rate Last Admin    etonogestreL subdermal device 68 mg  68 mg Implant  Aman Johnson MD   68 mg at 04/05/22 0945       Review of patient's allergies indicates:  No Known Allergies    Review of Systems  Review of Systems   Constitutional:  Negative for activity change, appetite change, chills, fatigue, fever and unexpected weight change.   HENT:  Negative for mouth sores.    Respiratory:  Negative for cough, shortness of breath and wheezing.    Cardiovascular:  Negative for chest pain and palpitations.   Gastrointestinal:  Negative for abdominal pain, bloating, blood in stool, constipation, nausea and vomiting.   Endocrine: Negative for diabetes and hot flashes.   Genitourinary:  Positive for  pelvic pain. Negative for dysmenorrhea, dyspareunia, dysuria, frequency, hematuria, menorrhagia, menstrual problem, urgency, vaginal bleeding, vaginal discharge, vaginal pain, urinary incontinence, postcoital bleeding and vaginal odor.   Musculoskeletal:  Negative for back pain and myalgias.        Neck pain   Integumentary:  Negative for rash, breast mass and nipple discharge.   Neurological:  Negative for seizures and headaches.   Psychiatric/Behavioral:  Negative for depression and sleep disturbance. The patient is not nervous/anxious.    Breast: Negative for mass, mastodynia and nipple discharge        Objective:    Physical Exam:   Constitutional: She appears well-developed and well-nourished. No distress.   BMI of 31.92    HENT:   Head: Normocephalic and atraumatic.    Eyes: EOM are normal.      Pulmonary/Chest: Effort normal. No respiratory distress.   Breasts: Non-tender, no engorgement, no masses, no retraction, no discharge. Negative for lymphadenopathy.         Abdominal: Soft. She exhibits no distension. There is no abdominal tenderness. There is no rebound and no guarding.     Genitourinary:    Uterus normal.   There is vaginal discharge in the vagina.    Genitourinary Comments: Vulva without any obvious lesions.  Urethral meatus normal size and location without any lesion.  Urethra is non-tender without stricture or discharge.  Bladder is non-tender.  Vaginal vault with good support.  Minimal dark discharge noted.  No obvious lesion.  Normal rugation.  Cervix is without any cervical motion tenderness.  No obvious lesion.  Uterus is small, non-tender, normal contour.  Adnexa is without any masses or tenderness.  Perineum without obvious lesion.               Musculoskeletal: Normal range of motion.      Comments: Left arm with Nexplanon in place       Neurological: She is alert.    Skin: Skin is warm and dry.    Psychiatric: She has a normal mood and affect.        Assessment:        1. Well woman exam  with routine gynecological exam    2. Spotting    3.  Nexplanon in left arm         Plan:          I have discussed with the patient her condition.  Monthly breast examination was instructed, discussed, and encouraged.  Patient was encouraged to consume a low-calorie, low fat diet, and to increase of physical activity.  Healthy habits encouraged.  A Pap smear was not performed with HR-HPV according to the USPSTF recommendations.  Mammogram was not ordered because of the combination of her age and risk factors, according to ACOG guidelines.  Gonorrhea and Chlamydia testing performed;  HIV test offered, again according to guidelines.    Care Gaps discussed  Continue with Nexplanon    She will come back to see me in one year for her annual visit.  She can come back to see me sooner as necessary.  All of her questions were answered appropriately to her satisfaction.

## 2023-03-01 ENCOUNTER — PATIENT MESSAGE (OUTPATIENT)
Dept: OBSTETRICS AND GYNECOLOGY | Facility: CLINIC | Age: 33
End: 2023-03-01
Payer: MEDICAID

## 2023-03-01 DIAGNOSIS — B96.89 BACTERIAL VAGINOSIS: Primary | ICD-10-CM

## 2023-03-01 DIAGNOSIS — N76.0 BACTERIAL VAGINOSIS: Primary | ICD-10-CM

## 2023-03-01 LAB
C TRACH DNA SPEC QL NAA+PROBE: NOT DETECTED
N GONORRHOEA DNA SPEC QL NAA+PROBE: NOT DETECTED

## 2023-03-01 RX ORDER — METRONIDAZOLE 500 MG/1
500 TABLET ORAL EVERY 12 HOURS
Qty: 14 TABLET | Refills: 0 | Status: SHIPPED | OUTPATIENT
Start: 2023-03-01 | End: 2023-12-04

## 2023-03-18 ENCOUNTER — HOSPITAL ENCOUNTER (EMERGENCY)
Facility: HOSPITAL | Age: 33
Discharge: HOME | End: 2023-03-19
Attending: EMERGENCY MEDICINE
Payer: MEDICAID

## 2023-03-18 DIAGNOSIS — R06.02 SHORTNESS OF BREATH: ICD-10-CM

## 2023-03-18 DIAGNOSIS — M54.12 CERVICAL RADICULOPATHY: Primary | ICD-10-CM

## 2023-03-18 DIAGNOSIS — M25.519 SHOULDER PAIN: ICD-10-CM

## 2023-03-18 LAB
B-HCG UR QL: NEGATIVE
CTP QC/QA: YES

## 2023-03-18 PROCEDURE — 99284 EMERGENCY DEPT VISIT MOD MDM: CPT

## 2023-03-18 PROCEDURE — 93010 ELECTROCARDIOGRAM REPORT: CPT | Mod: ,,, | Performed by: INTERNAL MEDICINE

## 2023-03-18 PROCEDURE — 81025 URINE PREGNANCY TEST: CPT | Performed by: EMERGENCY MEDICINE

## 2023-03-18 PROCEDURE — 93010 EKG 12-LEAD: ICD-10-PCS | Mod: ,,, | Performed by: INTERNAL MEDICINE

## 2023-03-18 PROCEDURE — 93005 ELECTROCARDIOGRAM TRACING: CPT

## 2023-03-18 RX ORDER — ORPHENADRINE CITRATE 30 MG/ML
30 INJECTION INTRAMUSCULAR; INTRAVENOUS
Status: COMPLETED | OUTPATIENT
Start: 2023-03-19 | End: 2023-03-19

## 2023-03-18 RX ORDER — KETOROLAC TROMETHAMINE 30 MG/ML
30 INJECTION, SOLUTION INTRAMUSCULAR; INTRAVENOUS
Status: COMPLETED | OUTPATIENT
Start: 2023-03-19 | End: 2023-03-19

## 2023-03-19 VITALS
RESPIRATION RATE: 18 BRPM | HEART RATE: 64 BPM | WEIGHT: 180 LBS | SYSTOLIC BLOOD PRESSURE: 154 MMHG | TEMPERATURE: 98 F | OXYGEN SATURATION: 100 % | DIASTOLIC BLOOD PRESSURE: 81 MMHG | BODY MASS INDEX: 29.99 KG/M2 | HEIGHT: 65 IN

## 2023-03-19 PROCEDURE — 96372 THER/PROPH/DIAG INJ SC/IM: CPT

## 2023-03-19 PROCEDURE — 63600175 PHARM REV CODE 636 W HCPCS

## 2023-03-19 RX ADMIN — ORPHENADRINE CITRATE 30 MG: 30 INJECTION INTRAMUSCULAR; INTRAVENOUS at 12:03

## 2023-03-19 RX ADMIN — KETOROLAC TROMETHAMINE 30 MG: 30 INJECTION, SOLUTION INTRAMUSCULAR at 12:03

## 2023-03-19 NOTE — DISCHARGE INSTRUCTIONS
Thank you for coming to our Emergency Department today. It is important to remember that some problems are difficult to diagnose and may not be found during your first visit. Be sure to follow up with your primary care doctor and review any labs/imaging that was performed with them. If you do not have a primary care doctor, you may contact the one listed on your discharge paperwork or you may also call the [facility] Appointment Desk at [phone] to schedule an appointment with one.     All medications may potentially have side effects and it is impossible to predict which medications may give you side effects. If you feel that you are having a negative effect of any medication you should immediately stop taking them and seek medical attention.    Return to the ER with any questions/concerns, new/concerning symptoms, worsening or failure to improve. Do not drive or make any important decisions for 24 hours if you have received any pain medications, sedatives or mood altering drugs during your ER visit.

## 2023-03-19 NOTE — ED TRIAGE NOTES
"31 yo female presents to the ED with c/o right shoulder pain. Pt reports that she has a "pinched nerve" in her neck secondary to 3 compressed discs in the cervical spine. Pt states that she was scheduled to have sx but "put it off" until her kids are out of school. Pt describes pain as sharp and shooting, accompanied with numbness to the right hand rates pain at a 9 on a PRS of 0-10.    "

## 2023-03-19 NOTE — ED PROVIDER NOTES
Encounter Date: 3/18/2023       History     Chief Complaint   Patient presents with    Shoulder Pain     Reports hx of pinched nerve in neck. States pain now radiates down R shoulder and arm. Pt states sharp and throbbing. Pt states she is supposed to have surgery but delayed due to .     Crystal Tsai is a 32-year-old female with past medical history of herniated discs in her cervical spine who presents to the emergency department with a chief complaint of right-sided chest wall and shoulder pain.  Reports a history of chronic neck and right arm pain secondary to herniated discs for which she has received steroid injections and been followed by Orthopedics/spine in the past.  However, this pain has always been down her right arm and today her pain is present in her right shoulder and on the anterior side of her right chest.  Pain began at work.  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  Pain is described as both sharp and pressure-like, 8/10 severity, and with no radiation.  She did not take any medications prior to arrival to attempt to alleviate her symptoms.    The history is provided by the patient. No  was used.   Review of patient's allergies indicates:  No Known Allergies  Past Medical History:   Diagnosis Date    Interstitial cystitis     Kidney stone     Migraine     UTI (urinary tract infection)      Past Surgical History:   Procedure Laterality Date    ABDOMINAL SURGERY      CYSTOSCOPY W/ RETROGRADES Bilateral 1/4/2019    Procedure: CYSTOSCOPY, WITH RETROGRADE PYELOGRAM; hydrodistention;  Surgeon: Kirsty Acevedo MD;  Location: [facility] OR;  Service: Urology;  Laterality: Bilateral;  RN PREOP 12/31/2018    EXAMINATION UNDER ANESTHESIA N/A 12/17/2018    Procedure: Exam under anesthesia;  Surgeon: Edilson aSntana MD;  Location: [facility] OR;  Service: General;  Laterality:  N/A;    MA EXPLORATORY OF ABDOMEN      PROCTOSIGMOIDOSCOPY N/A 12/17/2018    Procedure: PROCTOSIGMOIDOSCOPY;  Surgeon: Edilson Santana MD;  Location: [facility] OR;  Service: General;  Laterality: N/A;     Family History   Problem Relation Age of Onset    Cancer Mother     Diabetes Father      Social History     Tobacco Use    Smoking status: Every Day     Packs/day: 1.00     Types: Cigarettes    Smokeless tobacco: Never   Substance Use Topics    Alcohol use: Yes     Alcohol/week: 0.0 standard drinks     Comment: occassionally    Drug use: Yes     Types: Marijuana     Review of Systems   Constitutional:  Negative for chills and fever.   HENT:  Negative for sore throat.    Respiratory:  Negative for shortness of breath and wheezing.    Cardiovascular:  Positive for chest pain (Chest wall). Negative for palpitations.   Gastrointestinal:  Negative for nausea and vomiting.   Genitourinary:  Negative for dysuria.   Musculoskeletal:  Positive for arthralgias (Right shoulder). Negative for back pain.   Skin:  Negative for rash.   Neurological:  Negative for syncope, weakness and headaches.   Hematological:  Does not bruise/bleed easily.     Physical Exam     Initial Vitals [03/18/23 2302]   BP Pulse Resp Temp SpO2   (!) 138/90 73 20 98.4 °F (36.9 °C) 100 %      MAP       --         Physical Exam    Nursing note and vitals reviewed.  Constitutional: Vital signs are normal. She appears well-developed and well-nourished. She is cooperative. She does not appear ill. No distress.   HENT:   Head: Normocephalic and atraumatic.   Right Ear: Hearing and external ear normal.   Left Ear: Hearing and external ear normal.   Nose: Nose normal.   Eyes: Conjunctivae and EOM are normal.   Neck: Phonation normal.   Normal range of motion.  Cardiovascular:  Normal rate, regular rhythm, S1 normal, S2 normal, normal heart sounds and normal pulses.     Exam reveals no distant heart sounds and no friction rub.       No murmur  heard.  Pulmonary/Chest: Effort normal and breath sounds normal. No accessory muscle usage. No tachypnea. No respiratory distress. She has no decreased breath sounds. She has no wheezes. She has no rhonchi. She has no rales.   Abdominal: Abdomen is soft. She exhibits no distension. There is no abdominal tenderness.   Musculoskeletal:      Right shoulder: No tenderness, bony tenderness or crepitus. Decreased range of motion. Decreased strength.      Cervical back: Normal range of motion.     Neurological: She is alert and oriented to person, place, and time. GCS eye subscore is 4. GCS verbal subscore is 5. GCS motor subscore is 6.   Skin: Skin is warm. Capillary refill takes less than 2 seconds.       ED Course   Procedures  Labs Reviewed   POCT URINE PREGNANCY     EKG Readings: (Independently Interpreted)   Initial Reading: No STEMI.   Normal sinus rhythm with a ventricular rate of 73 beats per minute.  No QT prolongation.  No STEMI.  No ST segment elevations or depressions.  No T-wave changes.     Imaging Results              X-Ray Chest 1 View (Final result)  Result time 03/19/23 00:27:45      Final result by Raysa Finney MD (03/19/23 00:27:45)                   Impression:      No acute intrathoracic abnormality identified on this single radiographic view of the chest.      Electronically signed by: Raysa Finney MD  Date:    03/19/2023  Time:    00:27               Narrative:    EXAMINATION:  XR CHEST 1 VIEW    CLINICAL HISTORY:  Shortness of breath    TECHNIQUE:  Single frontal view of the chest was performed.    COMPARISON:  07/10/2022    FINDINGS:  The cardiomediastinal silhouette is within normal limits. The visualized airway is unremarkable.  The lungs appear symmetrically expanded without definite evidence of confluent airspace consolidation, significant volume of pleural fluid or pneumothorax.  Visualized osseous structures are intact.                                    X-Rays:   Independently  Interpreted Readings:   Chest X-Ray: Normal heart size. Trachea midline.  Lungs symmetrically expanded.  Bilateral costophrenic angles are clear.  No signs of focal consolidation.  No pneumothorax.  No fractures or dislocations.  Soft tissues unremarkable.   Medications   ketorolac injection 30 mg (30 mg Intramuscular Given 3/19/23 0012)   orphenadrine injection 30 mg (30 mg Intramuscular Given 3/19/23 0012)     Medical Decision Making:   Initial Assessment:   32-year-old female presenting to the emergency department with a chief complaint of right shoulder pain.  Differential Diagnosis:   Differential diagnosis includes but is not limited to overuse injury, soft tissue injury, dislocation, fracture, or worsening of her chronic radiculopathy.  Independently Interpreted Test(s):   I have ordered and independently interpreted X-rays - see prior notes.  I have ordered and independently interpreted EKG Reading(s) - see prior notes  Clinical Tests:   Lab Tests: Ordered and Reviewed       <> Summary of Lab: UPT negative.  Radiological Study: Ordered and Reviewed  ED Management:  Patient presenting to the emergency department with a chief complaint of right-sided chest wall/shoulder pain.  She has a history of radiculopathy that goes down her right arm but has never had pain in her right shoulder.  On physical exam, patient appears uncomfortable.  She has decreased range of motion and strength of the right shoulder secondary to pain and discomfort.  X-ray of the chest negative for any acute abnormalities.  Suspect acute worsening of patient's chronic cervical radiculopathy.  Treated pain with Toradol and Norflex with significant improvement in symptoms.  Patient's range of motion and strength also improved.  Patient states that she has plenty of muscle relaxers and analgesics including narcotics at home due to her chronic cervical radiculopathy.  Declined any prescriptions for further medication for symptomatic control at  home.  Follows with a neck and spine specialist.    Due to chest pain on arrival, EKG was obtained.  Normal EKG with normal sinus rhythm and no STEMI.  Patient's history is not consistent with cardiac chest pain.  No further laboratory or imaging studies were obtained.  No suspicion for acute coronary syndrome at this time.  Also considered but doubt fracture, dislocation, pulmonary embolism.    Return precautions were discussed, all patient questions were answered, and the patient was agreeable to the plan of care.  She was discharged home in stable condition and will follow up with her primary care provider or return to the emergency department if her symptoms worsen or do not improve.                         Clinical Impression:   Final diagnoses:  [M25.519] Shoulder pain  [R06.02] Shortness of breath  [M54.12] Cervical radiculopathy (Primary)        ED Disposition Condition    Discharge Stable          ED Prescriptions    None       Follow-up Information       Follow up With Specialties Details Why Contact Info    Jayme Romano NP Internal Medicine Schedule an appointment as soon as possible for a visit  If symptoms worsen, As needed [address] [facility] [address]  [phone]      [facility] - Emergency Dept Emergency Medicine Go to  If symptoms worsen [address] [facility] [address]  [phone]             José Umanzor PA-C  03/19/23 0524

## 2023-10-07 ENCOUNTER — HOSPITAL ENCOUNTER (EMERGENCY)
Facility: HOSPITAL | Age: 33
Discharge: HOME | End: 2023-10-07
Payer: MEDICAID

## 2023-10-07 VITALS
HEIGHT: 65 IN | DIASTOLIC BLOOD PRESSURE: 91 MMHG | SYSTOLIC BLOOD PRESSURE: 113 MMHG | RESPIRATION RATE: 19 BRPM | OXYGEN SATURATION: 100 % | TEMPERATURE: 98 F | BODY MASS INDEX: 29.99 KG/M2 | WEIGHT: 180 LBS | HEART RATE: 76 BPM

## 2023-10-07 DIAGNOSIS — M72.2 PLANTAR FASCIITIS OF RIGHT FOOT: Primary | ICD-10-CM

## 2023-10-07 DIAGNOSIS — M79.673 HEEL PAIN: ICD-10-CM

## 2023-10-07 DIAGNOSIS — M77.31 HEEL SPUR, RIGHT: ICD-10-CM

## 2023-10-07 PROCEDURE — 73630 X-RAY EXAM OF FOOT: CPT | Mod: TC,RT

## 2023-10-07 PROCEDURE — 96372 THER/PROPH/DIAG INJ SC/IM: CPT | Performed by: NURSE PRACTITIONER

## 2023-10-07 PROCEDURE — 73630 XR FOOT COMPLETE 3 VIEW RIGHT: ICD-10-PCS | Mod: 26,RT,, | Performed by: RADIOLOGY

## 2023-10-07 PROCEDURE — 99284 EMERGENCY DEPT VISIT MOD MDM: CPT

## 2023-10-07 PROCEDURE — 73630 X-RAY EXAM OF FOOT: CPT | Mod: 26,RT,, | Performed by: RADIOLOGY

## 2023-10-07 PROCEDURE — 63600175 PHARM REV CODE 636 W HCPCS: Performed by: NURSE PRACTITIONER

## 2023-10-07 RX ORDER — METHYLPREDNISOLONE 4 MG/1
TABLET ORAL
Qty: 1 EACH | Refills: 0 | Status: SHIPPED | OUTPATIENT
Start: 2023-10-07 | End: 2023-10-28

## 2023-10-07 RX ORDER — KETOROLAC TROMETHAMINE 30 MG/ML
60 INJECTION, SOLUTION INTRAMUSCULAR; INTRAVENOUS
Status: COMPLETED | OUTPATIENT
Start: 2023-10-07 | End: 2023-10-07

## 2023-10-07 RX ADMIN — KETOROLAC TROMETHAMINE 60 MG: 30 INJECTION, SOLUTION INTRAMUSCULAR; INTRAVENOUS at 09:10

## 2023-10-07 NOTE — Clinical Note
"Crystal Tsai (Hope) was seen and treated in our emergency department on 10/7/2023.  She may return to work on 10/10/2023.       If you have any questions or concerns, please don't hesitate to call.       RN    "

## 2023-10-08 NOTE — ED PROVIDER NOTES
Encounter Date: 10/7/2023       History     Chief Complaint   Patient presents with    Heel Pain     Patient reports that this morning she was unable to bear weight on her right foot.Pain is specifically in the heel.  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      Presents to ED with complaints of right heel pain.  States that she has been unable to bear weight on the right heel since this morning.  She does report that she is worked 8 days of 12 hour shifts in a row as a  recently.  No history of trauma.  She is had a bare all of her weight on the ball of her foot today.  Pain is much worse when 1st putting weight on the foot but is painful all the time.      Review of patient's allergies indicates:  No Known Allergies  Past Medical History:   Diagnosis Date    Interstitial cystitis     Kidney stone     Migraine     UTI (urinary tract infection)      Past Surgical History:   Procedure Laterality Date    ABDOMINAL SURGERY      CYSTOSCOPY W/ RETROGRADES Bilateral 1/4/2019    Procedure: CYSTOSCOPY, WITH RETROGRADE PYELOGRAM; hydrodistention;  Surgeon: Kirsty Acevedo MD;  Location: [facility] OR;  Service: Urology;  Laterality: Bilateral;  RN PREOP 12/31/2018    EXAMINATION UNDER ANESTHESIA N/A 12/17/2018    Procedure: Exam under anesthesia;  Surgeon: Edilson Santana MD;  Location: [facility] OR;  Service: General;  Laterality: N/A;    NM EXPLORATORY OF ABDOMEN      PROCTOSIGMOIDOSCOPY N/A 12/17/2018    Procedure: PROCTOSIGMOIDOSCOPY;  Surgeon: Edilson Santana MD;  Location: [facility] OR;  Service: General;  Laterality: N/A;     Family History   Problem Relation Age of Onset    Cancer Mother     Diabetes Father      Social History     Tobacco Use    Smoking status: Former     Current packs/day: 1.00     Types: Cigarettes    Smokeless tobacco: Never   Substance Use Topics    Alcohol use: Not Currently     Comment: occassionally    Drug use: Yes     Types:  Marijuana     Review of Systems   Constitutional:  Negative for fever.   HENT:  Negative for sore throat.    Respiratory:  Negative for shortness of breath.    Cardiovascular:  Negative for chest pain.   Gastrointestinal:  Negative for nausea.   Genitourinary:  Negative for dysuria.   Musculoskeletal:  Negative for back pain.        Right heel pain   Skin:  Negative for rash.   Neurological:  Negative for weakness.   Hematological:  Does not bruise/bleed easily.       Physical Exam     Initial Vitals [10/07/23 1901]   BP Pulse Resp Temp SpO2   (!) 113/91 76 19 98.3 °F (36.8 °C) 100 %      MAP       --         Physical Exam    Nursing note and vitals reviewed.  Constitutional: She appears well-developed and well-nourished.   HENT:   Head: Normocephalic and atraumatic.   Eyes: EOM are normal.   Neck: Neck supple.   Cardiovascular:  Normal rate and regular rhythm.           Pulmonary/Chest: Breath sounds normal. She has no wheezes.   Abdominal: Abdomen is soft. There is no abdominal tenderness.   Musculoskeletal:      Cervical back: Neck supple.      Comments: Pain on palpation solar surface of heel.  No redness warmth.  No foreign body.     Neurological: She is alert and oriented to person, place, and time.   Skin: Skin is warm and dry. Capillary refill takes less than 2 seconds.   Psychiatric: She has a normal mood and affect.         ED Course   Procedures  Labs Reviewed - No data to display       Imaging Results              X-Ray Foot Complete Right (In process)                      Medications   ketorolac injection 60 mg (has no administration in time range)     Medical Decision Making  Amount and/or Complexity of Data Reviewed  Radiology: ordered.               ED Course as of 10/07/23 2125   Sat Oct 07, 2023   2122 Discussed with Dr Morales, he viewed images [NP]   2123 X-Ray Foot Complete Right [NP]      ED Course User Index  [NP] Jennifer Boyd, AMADOU                    Clinical Impression:   Final  diagnoses:  [M79.673] Heel pain  [M72.2] Plantar fasciitis of right foot (Primary)  [M77.31] Heel spur, right        ED Disposition Condition    Discharge Good          ED Prescriptions       Medication Sig Dispense Start Date End Date Auth. Provider    methylPREDNISolone (MEDROL DOSEPACK) 4 mg tablet Take as directed 1 each 10/7/2023 10/28/2023 Jennifer Boyd FNP          Follow-up Information       Follow up With Specialties Details Why Contact Info    Jayme Romano NP Internal Medicine  As needed [address] [place] [address]  [phone]               Jennifer Boyd FNP  10/07/23 2129

## 2023-11-06 NOTE — PROGRESS NOTES
"Pt educated that this is a nonsmoking facility. Pt rolled eyes and said "I'm going smoke then ya'll can do whatever".   " V-Y Plasty Text: The defect edges were debeveled with a #15 scalpel blade. Given the location of the defect, shape of the defect and the proximity to free margins an V-Y advancement flap was deemed most appropriate. Using a sterile surgical marker, an appropriate advancement flap was drawn incorporating the defect and placing the expected incisions within the relaxed skin tension lines where possible. The area thus outlined was incised deep to adipose tissue with a #15 scalpel blade. The skin margins were undermined to an appropriate distance in all directions utilizing iris scissors. Following this, the designed flap was advanced and carried over into the primary defect and sutured into place.

## 2023-11-28 NOTE — MR AVS SNAPSHOT
[facility]  [address], [address]  [place] [address]  Phone: [phone]                  Crystal Tsai   2017 10:10 AM   Office Visit    Description:  Female : 1990   Provider:  Walter Rojas MD   Department[facility]  [facility]           Reason for Visit     Urinary Tract Infection                To Do List           Goals (5 Years of Data)     None      [facility] On Call     [facility] On Call Nurse Care Line -  Assistance  Registered nurses in the [facility] On Call Center provide clinical advisement, health education, appointment booking, and other advisory services.  Call for this free service at [phone].             Medications           Message regarding Medications     Verify the changes and/or additions to your medication regime listed below are the same as discussed with your clinician today.  If any of these changes or additions are incorrect, please notify your healthcare provider.             Verify that the below list of medications is an accurate representation of the medications you are currently taking.  If none reported, the list may be blank. If incorrect, please contact your healthcare provider. Carry this list with you in case of emergency.           Current Medications     amoxicillin-clavulanate 875-125mg (AUGMENTIN) 875-125 mg per tablet     butalbital-acetaminophen-caffeine -40 mg (FIORICET, ESGIC) -40 mg per tablet Take 1 tablet by mouth every 4 (four) hours as needed for Pain.    clobetasol (TEMOVATE) 0.05 % cream Apply topically 2 (two) times daily.    etonogestrel (NEXPLANON) 68 mg Impl by Subdermal route.    labetalol (NORMODYNE) 100 MG tablet Take 1 tablet (100 mg total) by mouth 2 (two) times daily.    methylPREDNISolone (MEDROL DOSEPACK) 4 mg tablet follow package directions    VIRTUSSIN AC  mg/5 mL syrup            Clinical Reference Information           Vital Signs - Last Recorded  Most recent update:  [facility]  [facility] [address] [place] [address]  Scheduled Appointment: 12/14/2023     "1/9/2017 10:50 AM by Suzy Thurman MA    BP Ht Wt LMP BMI    120/74 5' 5" (1.651 m) 89.8 kg (198 lb) 12/24/2016 32.95 kg/m2      Blood Pressure          Most Recent Value    BP  120/74      Allergies as of 1/9/2017     Naprosyn [Naproxen]    Ultram [Tramadol]      Immunizations Administered on Date of Encounter - 1/9/2017     None      Smoking Cessation     If you would like to quit smoking:   You may be eligible for free services if you are a [place] resident and started smoking cigarettes before September 1, 1988.  Call the Smoking Cessation Trust (S[employer]) toll free at (773) 086-0117 or (401) 942-6311.   Call 1-180-QUIT-NOW if you do not meet the above criteria.            " [facility]  [facility] [address] [place] [address]  Scheduled Appointment: 12/14/2023     [facility]  [facility] [address] [place] [address]  Scheduled Appointment: 12/14/2023

## 2023-12-01 ENCOUNTER — HOSPITAL ENCOUNTER (EMERGENCY)
Facility: HOSPITAL | Age: 33
Discharge: HOME | End: 2023-12-02
Attending: EMERGENCY MEDICINE
Payer: MEDICAID

## 2023-12-01 DIAGNOSIS — N30.00 ACUTE CYSTITIS WITHOUT HEMATURIA: ICD-10-CM

## 2023-12-01 DIAGNOSIS — R10.32 LEFT LOWER QUADRANT ABDOMINAL PAIN: Primary | ICD-10-CM

## 2023-12-01 DIAGNOSIS — N83.201 CYST OF RIGHT OVARY: ICD-10-CM

## 2023-12-01 LAB
B-HCG UR QL: NEGATIVE
BACTERIA #/AREA URNS HPF: ABNORMAL /HPF
BILIRUB UR QL STRIP: NEGATIVE
CLARITY UR: CLEAR
COLOR UR: YELLOW
CTP QC/QA: YES
GLUCOSE UR QL STRIP: NEGATIVE
HGB UR QL STRIP: NEGATIVE
HYALINE CASTS #/AREA URNS LPF: 0 /LPF
KETONES UR QL STRIP: NEGATIVE
LEUKOCYTE ESTERASE UR QL STRIP: ABNORMAL
MICROSCOPIC COMMENT: ABNORMAL
NITRITE UR QL STRIP: NEGATIVE
PH UR STRIP: 7 [PH] (ref 5–8)
PROT UR QL STRIP: ABNORMAL
RBC #/AREA URNS HPF: 5 /HPF (ref 0–4)
SP GR UR STRIP: 1.03 (ref 1–1.03)
SQUAMOUS #/AREA URNS HPF: 3 /HPF
URN SPEC COLLECT METH UR: ABNORMAL
UROBILINOGEN UR STRIP-ACNC: NEGATIVE EU/DL
WBC #/AREA URNS HPF: 2 /HPF (ref 0–5)

## 2023-12-01 PROCEDURE — 99285 EMERGENCY DEPT VISIT HI MDM: CPT | Mod: 25

## 2023-12-01 PROCEDURE — 81000 URINALYSIS NONAUTO W/SCOPE: CPT | Performed by: STUDENT IN AN ORGANIZED HEALTH CARE EDUCATION/TRAINING PROGRAM

## 2023-12-01 PROCEDURE — 81025 URINE PREGNANCY TEST: CPT | Performed by: STUDENT IN AN ORGANIZED HEALTH CARE EDUCATION/TRAINING PROGRAM

## 2023-12-01 RX ORDER — ONDANSETRON 2 MG/ML
4 INJECTION INTRAMUSCULAR; INTRAVENOUS
Status: COMPLETED | OUTPATIENT
Start: 2023-12-02 | End: 2023-12-02

## 2023-12-01 RX ORDER — KETOROLAC TROMETHAMINE 30 MG/ML
15 INJECTION, SOLUTION INTRAMUSCULAR; INTRAVENOUS
Status: COMPLETED | OUTPATIENT
Start: 2023-12-02 | End: 2023-12-02

## 2023-12-02 VITALS
HEART RATE: 73 BPM | OXYGEN SATURATION: 98 % | BODY MASS INDEX: 31.65 KG/M2 | TEMPERATURE: 99 F | HEIGHT: 65 IN | WEIGHT: 190 LBS | DIASTOLIC BLOOD PRESSURE: 78 MMHG | RESPIRATION RATE: 18 BRPM | SYSTOLIC BLOOD PRESSURE: 112 MMHG

## 2023-12-02 LAB
ALBUMIN SERPL BCP-MCNC: 4.1 G/DL (ref 3.5–5.2)
ALP SERPL-CCNC: 57 U/L (ref 55–135)
ALT SERPL W/O P-5'-P-CCNC: 19 U/L (ref 10–44)
ANION GAP SERPL CALC-SCNC: 10 MMOL/L (ref 8–16)
AST SERPL-CCNC: 25 U/L (ref 10–40)
BASOPHILS # BLD AUTO: 0.02 K/UL (ref 0–0.2)
BASOPHILS NFR BLD: 0.2 % (ref 0–1.9)
BILIRUB SERPL-MCNC: 0.4 MG/DL (ref 0.1–1)
BUN SERPL-MCNC: 8 MG/DL (ref 6–20)
CALCIUM SERPL-MCNC: 9.3 MG/DL (ref 8.7–10.5)
CHLORIDE SERPL-SCNC: 105 MMOL/L (ref 95–110)
CO2 SERPL-SCNC: 24 MMOL/L (ref 23–29)
CREAT SERPL-MCNC: 0.8 MG/DL (ref 0.5–1.4)
DIFFERENTIAL METHOD: NORMAL
EOSINOPHIL # BLD AUTO: 0.1 K/UL (ref 0–0.5)
EOSINOPHIL NFR BLD: 0.8 % (ref 0–8)
ERYTHROCYTE [DISTWIDTH] IN BLOOD BY AUTOMATED COUNT: 13 % (ref 11.5–14.5)
EST. GFR  (NO RACE VARIABLE): >60 ML/MIN/1.73 M^2
GLUCOSE SERPL-MCNC: 81 MG/DL (ref 70–110)
HCT VFR BLD AUTO: 39 % (ref 37–48.5)
HGB BLD-MCNC: 13.1 G/DL (ref 12–16)
IMM GRANULOCYTES # BLD AUTO: 0.03 K/UL (ref 0–0.04)
IMM GRANULOCYTES NFR BLD AUTO: 0.3 % (ref 0–0.5)
LIPASE SERPL-CCNC: 37 U/L (ref 4–60)
LYMPHOCYTES # BLD AUTO: 3.2 K/UL (ref 1–4.8)
LYMPHOCYTES NFR BLD: 31.5 % (ref 18–48)
MCH RBC QN AUTO: 30.1 PG (ref 27–31)
MCHC RBC AUTO-ENTMCNC: 33.6 G/DL (ref 32–36)
MCV RBC AUTO: 90 FL (ref 82–98)
MONOCYTES # BLD AUTO: 0.6 K/UL (ref 0.3–1)
MONOCYTES NFR BLD: 5.9 % (ref 4–15)
NEUTROPHILS # BLD AUTO: 6.2 K/UL (ref 1.8–7.7)
NEUTROPHILS NFR BLD: 61.3 % (ref 38–73)
NRBC BLD-RTO: 0 /100 WBC
PLATELET # BLD AUTO: 207 K/UL (ref 150–450)
PMV BLD AUTO: 10.3 FL (ref 9.2–12.9)
POTASSIUM SERPL-SCNC: 3.6 MMOL/L (ref 3.5–5.1)
PROT SERPL-MCNC: 7.6 G/DL (ref 6–8.4)
RBC # BLD AUTO: 4.35 M/UL (ref 4–5.4)
SODIUM SERPL-SCNC: 139 MMOL/L (ref 136–145)
WBC # BLD AUTO: 10.04 K/UL (ref 3.9–12.7)

## 2023-12-02 PROCEDURE — 25500020 PHARM REV CODE 255: Performed by: EMERGENCY MEDICINE

## 2023-12-02 PROCEDURE — 83690 ASSAY OF LIPASE: CPT

## 2023-12-02 PROCEDURE — 96375 TX/PRO/DX INJ NEW DRUG ADDON: CPT

## 2023-12-02 PROCEDURE — 63600175 PHARM REV CODE 636 W HCPCS

## 2023-12-02 PROCEDURE — 96365 THER/PROPH/DIAG IV INF INIT: CPT

## 2023-12-02 PROCEDURE — 85025 COMPLETE CBC W/AUTO DIFF WBC: CPT

## 2023-12-02 PROCEDURE — 96361 HYDRATE IV INFUSION ADD-ON: CPT

## 2023-12-02 PROCEDURE — 80053 COMPREHEN METABOLIC PANEL: CPT

## 2023-12-02 PROCEDURE — 25000003 PHARM REV CODE 250

## 2023-12-02 RX ORDER — NAPROXEN 500 MG/1
500 TABLET ORAL 2 TIMES DAILY
Qty: 10 TABLET | Refills: 0 | Status: SHIPPED | OUTPATIENT
Start: 2023-12-02 | End: 2023-12-07

## 2023-12-02 RX ORDER — NAPROXEN 500 MG/1
500 TABLET ORAL 2 TIMES DAILY
Qty: 10 TABLET | Refills: 0 | Status: SHIPPED | OUTPATIENT
Start: 2023-12-02 | End: 2023-12-02 | Stop reason: SDUPTHER

## 2023-12-02 RX ORDER — CEPHALEXIN 500 MG/1
500 CAPSULE ORAL 2 TIMES DAILY
Qty: 10 CAPSULE | Refills: 0 | Status: SHIPPED | OUTPATIENT
Start: 2023-12-02 | End: 2023-12-02 | Stop reason: SDUPTHER

## 2023-12-02 RX ORDER — CEPHALEXIN 500 MG/1
500 CAPSULE ORAL 2 TIMES DAILY
Qty: 10 CAPSULE | Refills: 0 | Status: SHIPPED | OUTPATIENT
Start: 2023-12-02 | End: 2023-12-07

## 2023-12-02 RX ADMIN — CEFTRIAXONE 1 G: 1 INJECTION, POWDER, FOR SOLUTION INTRAMUSCULAR; INTRAVENOUS at 02:12

## 2023-12-02 RX ADMIN — ONDANSETRON 4 MG: 2 INJECTION INTRAMUSCULAR; INTRAVENOUS at 01:12

## 2023-12-02 RX ADMIN — IOHEXOL 100 ML: 350 INJECTION, SOLUTION INTRAVENOUS at 02:12

## 2023-12-02 RX ADMIN — KETOROLAC TROMETHAMINE 15 MG: 30 INJECTION, SOLUTION INTRAMUSCULAR at 01:12

## 2023-12-02 RX ADMIN — SODIUM CHLORIDE, POTASSIUM CHLORIDE, SODIUM LACTATE AND CALCIUM CHLORIDE 1000 ML: 600; 310; 30; 20 INJECTION, SOLUTION INTRAVENOUS at 01:12

## 2023-12-02 NOTE — DISCHARGE INSTRUCTIONS
Please follow-up with your OBGYN.  Thank you for coming to our Emergency Department today. It is important to remember that some problems are difficult to diagnose and may not be found during your Emergency Department visit. Be sure to follow up with your primary care doctor and review all labs/imaging/tests that were performed during this visit with them. Some labs/tests may be outside of the normal range and require non-emergent follow-up and further investigation to help diagnose/exclude/prevent complications or other medical conditions.    If you do not have a primary care doctor, you may contact the one listed on your discharge paperwork or you may also call the [facility] Appointment Desk at [phone] to schedule an appointment and establish care with one. It is important to your health that you have a primary care doctor.    Please take all medications as directed. All medications may potentially have side-effects and it is impossible to predict which medications may give you side-effects or what side-effects (if any) they will give you.. If you feel that you are having a negative effect or side-effect of any medication you should immediately stop taking them and seek medical attention. If you feel that you are having a life-threatening reaction call 911.    Return to the ER with any questions/concerns, new/concerning symptoms, worsening or failure to improve.     Do not drive, swim, climb to height, take a bath or make any important decisions for 24 hours if you have received any pain medications, sedatives or mood altering drugs during your ER visit.

## 2023-12-02 NOTE — ED PROVIDER NOTES
"Encounter Date: 12/1/2023       History     Chief Complaint   Patient presents with    Abdominal Pain     LLQ pain for the last 3 days, pain pt. States "When I stand it feels like my uterus is going to fall out." Describing pain as stabbing, no urinary symptoms, vaginal bleeding or discharge. +n/v no diarrhea. Reports hx of ovarian cysts.     Patent is a 33 year old female with PMH of ovarian cyst and interstitial cystitis who presents to the emergency department for evaluation of constant LLQ pain x 3 days. Patient reports heavily drinking last night and woke up with additional pain in LUQ and now radiating to L leg and L side of back. Describes pain as sharp and "pulling".  She also endorses 7 episodes of non bloody emesis today and black appearing stool today.  Passing flatus.  Reports subjective fever but denies any other URI symptoms.  Patient reports having a exploratory laparotomy and cystoscopy for recurrent UTI in the past. Patient is not currently sexually active and reports 2 male partners in the last year. LMP 11/10. No history of gallstones, pancreatitis, IBD, IBS, endometriosis, fibroids. Denies diarrhea, chest pain, SOB, dysuria, hematuria, vaginal pain, vaginal discharge, bleeding.    The history is provided by the patient.     Review of patient's allergies indicates:  No Known Allergies  Past Medical History:   Diagnosis Date    Interstitial cystitis     Kidney stone     Migraine     UTI (urinary tract infection)      Past Surgical History:   Procedure Laterality Date    ABDOMINAL SURGERY      CYSTOSCOPY W/ RETROGRADES Bilateral 1/4/2019    Procedure: CYSTOSCOPY, WITH RETROGRADE PYELOGRAM; hydrodistention;  Surgeon: Kirsty Acevedo MD;  Location: [facility] OR;  Service: Urology;  Laterality: Bilateral;  RN PREOP 12/31/2018    EXAMINATION UNDER ANESTHESIA N/A 12/17/2018    Procedure: Exam under anesthesia;  Surgeon: Edilson Santana MD;  Location: [facility] OR;  Service: General;  Laterality: N/A;    ME " EXPLORATORY OF ABDOMEN      PROCTOSIGMOIDOSCOPY N/A 12/17/2018    Procedure: PROCTOSIGMOIDOSCOPY;  Surgeon: Edilson Santana MD;  Location: [facility] OR;  Service: General;  Laterality: N/A;     Family History   Problem Relation Age of Onset    Cancer Mother     Diabetes Father      Social History     Tobacco Use    Smoking status: Former     Current packs/day: 1.00     Types: Cigarettes    Smokeless tobacco: Never   Substance Use Topics    Alcohol use: Not Currently     Comment: occassionally    Drug use: Yes     Types: Marijuana     Review of Systems   Constitutional:  Positive for fever. Negative for chills.   HENT:  Negative for sore throat.    Respiratory:  Negative for shortness of breath.    Cardiovascular:  Negative for chest pain.   Gastrointestinal:  Positive for abdominal pain and vomiting. Negative for diarrhea and nausea.        (+) Melena   Genitourinary:  Negative for dysuria, flank pain, frequency, hematuria, pelvic pain, vaginal bleeding, vaginal discharge and vaginal pain.   Musculoskeletal:  Negative for neck pain and neck stiffness.   Skin:  Negative for rash.   Neurological:  Negative for weakness, numbness and headaches.   Hematological:  Does not bruise/bleed easily.       Physical Exam     Initial Vitals [12/01/23 2250]   BP Pulse Resp Temp SpO2   138/85 82 18 98.3 °F (36.8 °C) 97 %      MAP       --         Physical Exam    Nursing note and vitals reviewed.  Constitutional: She appears well-developed and well-nourished.   HENT:   Head: Normocephalic and atraumatic.   Right Ear: External ear normal.   Left Ear: External ear normal.   Neck: Carotid bruit is not present.   Normal range of motion.  Cardiovascular:  Normal rate, regular rhythm, normal heart sounds and intact distal pulses.     Exam reveals no gallop and no friction rub.       No murmur heard.  Pulmonary/Chest: Breath sounds normal. No respiratory distress. She has no wheezes. She has no rhonchi. She has no rales.   Abdominal: Abdomen  is soft. Bowel sounds are normal. She exhibits no distension. There is abdominal tenderness in the left upper quadrant and left lower quadrant.   No right CVA tenderness.  No left CVA tenderness. There is no rebound, no guarding and negative Freeman's sign.   Musculoskeletal:         General: Normal range of motion.      Cervical back: Normal range of motion.     Neurological: She is alert and oriented to person, place, and time. GCS score is 15. GCS eye subscore is 4. GCS verbal subscore is 5. GCS motor subscore is 6.   Psychiatric: She has a normal mood and affect.         ED Course   Procedures  Labs Reviewed   URINALYSIS, REFLEX TO URINE CULTURE - Abnormal; Notable for the following components:       Result Value    Protein, UA 1+ (*)     Leukocytes, UA Trace (*)     All other components within normal limits    Narrative:     Specimen Source->Urine   URINALYSIS MICROSCOPIC - Abnormal; Notable for the following components:    RBC, UA 5 (*)     All other components within normal limits    Narrative:     Specimen Source->Urine   CBC W/ AUTO DIFFERENTIAL   COMPREHENSIVE METABOLIC PANEL   LIPASE   POCT URINE PREGNANCY          Imaging Results              CT Abdomen Pelvis With IV Contrast NO Oral Contrast (Final result)  Result time 12/02/23 03:47:38      Final result by Raysa Finney MD (12/02/23 03:47:38)                   Impression:      1. Hypoattenuating right adnexal structure measuring 4.7 x 3.7 cm suggestive of a cyst.  Further assessment with ultrasound can be performed as clinically warranted.  2. Additional findings as above.      Electronically signed by: Raysa Finney MD  Date:    12/02/2023  Time:    03:47               Narrative:    EXAMINATION:  CT ABDOMEN PELVIS WITH IV CONTRAST    CLINICAL HISTORY:  LLQ abdominal pain;    TECHNIQUE:  Low dose axial images, sagittal and coronal reformations were obtained from the lung bases to the pubic symphysis following the IV administration of 100 cc of  Omnipaque 350 .  No oral contrast.    COMPARISON:  Renal stone study 03/20/2019, urogram 12/17/2018, CT abdomen pelvis 12/13/2018    FINDINGS:  The visualized lung bases are free of pleural fluid or focal consolidation.  The visualized portions of the heart and pericardium are within normal limits.    The liver demonstrates a subcentimeter right hepatic hypodensity too small to definitively characterize.  No calcified stones are identified in the gallbladder lumen.  The portal vein and splenic vein are patent.  The spleen, pancreas and adrenal glands are unremarkable.  No significant intra or extrahepatic biliary ductal dilatation.    The kidneys are normal in size and location and enhance symmetrically.  There is no hydronephrosis.  Urinary bladder is distended with smooth margins.  Punctate calcifications in the pelvis presumably reflect phleboliths.  The uterus and left adnexal region are within normal limits.  There is a 4.7 x 3.7 cm anechoic structure in the right adnexal region suggestive of a cyst.    The abdominal aorta is nonaneurysmal.  Shotty periaortic lymph nodes are present.    The visualized loops of large and small bowel demonstrate no evidence of obstruction or inflammatory change.  The appendix is unremarkable.  There is no ascites, free intraperitoneal air or portal venous gas.    The visualized osseous structures are intact.  There is a small fat containing umbilical hernia.                                       Medications   ketorolac injection 15 mg (15 mg Intravenous Given 12/2/23 0114)   ondansetron injection 4 mg (4 mg Intravenous Given 12/2/23 0114)   lactated ringers bolus 1,000 mL (0 mLs Intravenous Stopped 12/2/23 0211)   iohexoL (OMNIPAQUE 350) injection 100 mL (100 mLs Intravenous Given 12/2/23 0211)   cefTRIAXone (ROCEPHIN) 1 g in dextrose 5 % in water (D5W) 100 mL IVPB (MB+) (0 g Intravenous Stopped 12/2/23 0403)     Medical Decision Making  This is an emergent evaluation of a  33-year-old female with a past medical history of ovarian cyst who presents to the emergency department for evaluation of constant sharp left lower quadrant abdominal pain x 3 days.  Physical exam reveals tenderness to palpation in the left upper quadrant and left lower quadrant of the abdomen.  Abdomen is otherwise soft, nondistended, with normal bowel sounds. Regular rate rhythm without murmurs.  No carotid bruits appreciated on exam. Lungs are clear to auscultation bilaterally.  Differential diagnosis includes but is not limited to diverticulitis, appendicitis, cholecystitis, pancreatitis, GERD/gastritis, bowel obstruction.  Workup initiated with basic labs, lipase, urinalysis, UPT, CT A/P with IV contrast.  Ordered 1 L of lactated Ringer's.  Ordered Zofran.  Ordered Toradol.  CBC is without leukocytosis or anemia.  CMP with normal renal function, no electrolyte abnormalities.  Lipase within normal limits, no evidence for acute pancreatitis.  UPT negative.  Urinalysis showing trace leukocytes, negative for nitrites.  Ordered 1 g of Rocephin while here in the ED. CT A/P with IV contrast shows hypoattenuating right adnexal structure measuring 4.7 x 3.7 cm suggestive of a cyst.  Further assessment with ultrasound can be performed as clinically warranted.  Patient reports she already knew about this cyst.  Will treat for urinary tract infection.  Will send home with a course of Keflex.  Will send home with naproxen for pain.  Patient reports she already has an upcoming appointment with OBGYN.  No emergent pathology identified today.  Clinically, patient looks well. Patient is very well appearing, and in no acute distress. Vital signs are reassuring here in the emergency department, patient is afebrile, breathing comfortable, satting 97 % on room air. Patient/Caregiver is stable for discharge at this time. Patient/Caregiver verbalize understanding of care plan. All questions and concerns were addressed. Discussed  strict return precautions with the patient/caregiver. Instructed follow up with primary care provider within 1 week.      Kendrick Castano PA-C    DISCLAIMER: This note was prepared with [employer] voice recognition transcription software. Garbled syntax, mangled pronouns, and other bizarre constructions may be attributed to that software system.     Amount and/or Complexity of Data Reviewed  Labs: ordered.  Radiology: ordered.    Risk  Prescription drug management.                                      Clinical Impression:  Final diagnoses:  [R10.32] Left lower quadrant abdominal pain (Primary)  [N83.201] Cyst of right ovary  [N30.00] Acute cystitis without hematuria          ED Disposition Condition    Discharge Stable          ED Prescriptions       Medication Sig Dispense Start Date End Date Auth. Provider    cephALEXin (KEFLEX) 500 MG capsule  (Status: Discontinued) Take 1 capsule (500 mg total) by mouth 2 (two) times a day. for 5 days 10 capsule 12/2/2023 12/2/2023 Kendrick Castano PA-C    naproxen (NAPROSYN) 500 MG tablet  (Status: Discontinued) Take 1 tablet (500 mg total) by mouth 2 (two) times daily. for 5 days 10 tablet 12/2/2023 12/2/2023 Kendrick Castano PA-C    cephALEXin (KEFLEX) 500 MG capsule Take 1 capsule (500 mg total) by mouth 2 (two) times a day. for 5 days 10 capsule 12/2/2023 12/7/2023 Kendrick Castano PA-C    naproxen (NAPROSYN) 500 MG tablet Take 1 tablet (500 mg total) by mouth 2 (two) times daily. for 5 days 10 tablet 12/2/2023 12/7/2023 Kendrick Castano PA-C          Follow-up Information       Follow up With Specialties Details Why Contact Info    Savana Rashid MD Family Medicine   [address]  [place] [address]  [phone]      [facility] Emergency Dept Emergency Medicine Go to  As needed, If symptoms worsen, or new symptoms develop [address] [facility] [place] [address] [phone]                 Kendrick Castano  ROLAND  12/02/23 0410

## 2023-12-04 ENCOUNTER — OFFICE VISIT (OUTPATIENT)
Dept: OBSTETRICS AND GYNECOLOGY | Facility: CLINIC | Age: 33
End: 2023-12-04
Payer: MEDICAID

## 2023-12-04 VITALS
DIASTOLIC BLOOD PRESSURE: 70 MMHG | BODY MASS INDEX: 31.96 KG/M2 | SYSTOLIC BLOOD PRESSURE: 110 MMHG | WEIGHT: 191.81 LBS | HEIGHT: 65 IN

## 2023-12-04 DIAGNOSIS — N30.10 INTERSTITIAL CYSTITIS: ICD-10-CM

## 2023-12-04 DIAGNOSIS — R10.2 PELVIC PAIN IN FEMALE: Primary | ICD-10-CM

## 2023-12-04 DIAGNOSIS — B96.89 BACTERIAL VAGINOSIS: ICD-10-CM

## 2023-12-04 DIAGNOSIS — N76.0 BACTERIAL VAGINOSIS: ICD-10-CM

## 2023-12-04 PROCEDURE — 99213 OFFICE O/P EST LOW 20 MIN: CPT | Mod: S$PBB,,, | Performed by: OBSTETRICS & GYNECOLOGY

## 2023-12-04 PROCEDURE — 87491 CHLMYD TRACH DNA AMP PROBE: CPT | Performed by: OBSTETRICS & GYNECOLOGY

## 2023-12-04 PROCEDURE — 1159F MED LIST DOCD IN RCRD: CPT | Mod: CPTII,,, | Performed by: OBSTETRICS & GYNECOLOGY

## 2023-12-04 PROCEDURE — 99999 PR PBB SHADOW E&M-EST. PATIENT-LVL III: ICD-10-PCS | Mod: PBBFAC,,, | Performed by: OBSTETRICS & GYNECOLOGY

## 2023-12-04 PROCEDURE — 3078F PR MOST RECENT DIASTOLIC BLOOD PRESSURE < 80 MM HG: ICD-10-PCS | Mod: CPTII,,, | Performed by: OBSTETRICS & GYNECOLOGY

## 2023-12-04 PROCEDURE — 3074F SYST BP LT 130 MM HG: CPT | Mod: CPTII,,, | Performed by: OBSTETRICS & GYNECOLOGY

## 2023-12-04 PROCEDURE — 3078F DIAST BP <80 MM HG: CPT | Mod: CPTII,,, | Performed by: OBSTETRICS & GYNECOLOGY

## 2023-12-04 PROCEDURE — 99213 OFFICE O/P EST LOW 20 MIN: CPT | Mod: PBBFAC | Performed by: OBSTETRICS & GYNECOLOGY

## 2023-12-04 PROCEDURE — 87591 N.GONORRHOEAE DNA AMP PROB: CPT | Performed by: OBSTETRICS & GYNECOLOGY

## 2023-12-04 PROCEDURE — 81514 NFCT DS BV&VAGINITIS DNA ALG: CPT | Performed by: OBSTETRICS & GYNECOLOGY

## 2023-12-04 PROCEDURE — 3074F PR MOST RECENT SYSTOLIC BLOOD PRESSURE < 130 MM HG: ICD-10-PCS | Mod: CPTII,,, | Performed by: OBSTETRICS & GYNECOLOGY

## 2023-12-04 PROCEDURE — 3008F PR BODY MASS INDEX (BMI) DOCUMENTED: ICD-10-PCS | Mod: CPTII,,, | Performed by: OBSTETRICS & GYNECOLOGY

## 2023-12-04 PROCEDURE — 99213 PR OFFICE/OUTPT VISIT, EST, LEVL III, 20-29 MIN: ICD-10-PCS | Mod: S$PBB,,, | Performed by: OBSTETRICS & GYNECOLOGY

## 2023-12-04 PROCEDURE — 1159F PR MEDICATION LIST DOCUMENTED IN MEDICAL RECORD: ICD-10-PCS | Mod: CPTII,,, | Performed by: OBSTETRICS & GYNECOLOGY

## 2023-12-04 PROCEDURE — 3008F BODY MASS INDEX DOCD: CPT | Mod: CPTII,,, | Performed by: OBSTETRICS & GYNECOLOGY

## 2023-12-04 PROCEDURE — 99999 PR PBB SHADOW E&M-EST. PATIENT-LVL III: CPT | Mod: PBBFAC,,, | Performed by: OBSTETRICS & GYNECOLOGY

## 2023-12-04 RX ORDER — HYDROXYZINE PAMOATE 25 MG/1
25 CAPSULE ORAL 2 TIMES DAILY
COMMUNITY
Start: 2023-11-30

## 2023-12-04 RX ORDER — TOPIRAMATE 50 MG/1
50 TABLET, FILM COATED ORAL
COMMUNITY
Start: 2023-11-30

## 2023-12-04 RX ORDER — METRONIDAZOLE 500 MG/1
500 TABLET ORAL EVERY 12 HOURS
Qty: 14 TABLET | Refills: 0 | Status: SHIPPED | OUTPATIENT
Start: 2023-12-04

## 2023-12-04 NOTE — PROGRESS NOTES
Subjective:      Patient ID: Crystal Tsai is a 33 y.o. female.    Chief Complaint:  Pelvic Pain (Pt with lower left pelvic pain since 2023)      History of Present Illness  HPI  Patient comes in today complaining of left-sided pelvic pain for the past 3 days  No increase in urinary frequency.  ?Urgency.  No dysuria    She went to the ED on 23.   UA with trace blood  Normal blood work  CT with right ovarian cyst at about 4 cm in diameter.    History of IC and chronic pelvic pain.   But she thinks the pain is different.  She does not think the pain is from the ovarian cyst since it is on the wrong side.  Would like to get an ultrasound      GYN & OB History  Patient's last menstrual period was 2023 (within days).   Date of Last Pap: 2020    OB History    Para Term  AB Living   2 2 2     2   SAB IAB Ectopic Multiple Live Births           2      # Outcome Date GA Lbr Tyler/2nd Weight Sex Delivery Anes PTL Lv   2 Term 14 40w0d  3.317 kg (7 lb 5 oz) F   N RAJ   1 Term 07 40w0d  3.26 kg (7 lb 3 oz) F Vag-Spont EPI N RAJ      Obstetric Comments   Pap neg 2020     Past Medical History:   Diagnosis Date    Interstitial cystitis     Kidney stone     Migraine     UTI (urinary tract infection)        Past Surgical History:   Procedure Laterality Date    ABDOMINAL SURGERY      CYSTOSCOPY W/ RETROGRADES Bilateral 2019    Procedure: CYSTOSCOPY, WITH RETROGRADE PYELOGRAM; hydrodistention;  Surgeon: Kirsty Acevedo MD;  Location: [facility] OR;  Service: Urology;  Laterality: Bilateral;  RN PREOP 2018    EXAMINATION UNDER ANESTHESIA N/A 2018    Procedure: Exam under anesthesia;  Surgeon: Edilson Santana MD;  Location: [facility] OR;  Service: General;  Laterality: N/A;    DE EXPLORATORY OF ABDOMEN      PROCTOSIGMOIDOSCOPY N/A 2018    Procedure: PROCTOSIGMOIDOSCOPY;  Surgeon: Edilson Santana MD;  Location: [facility] OR;  Service: General;  Laterality: N/A;        Family History   Problem Relation Age of Onset    Cancer Mother     Diabetes Father        Social History     Socioeconomic History    Marital status: Single   Tobacco Use    Smoking status: Former     Current packs/day: 1.00     Types: Cigarettes    Smokeless tobacco: Never   Substance and Sexual Activity    Alcohol use: Not Currently     Comment: occassionally    Drug use: Yes     Types: Marijuana    Sexual activity: Yes     Partners: Male     Birth control/protection: None   Social History Narrative    She has a high school diploma.     She moved to [place], [place] in 8/2022    Working as a     New partner since 5/2022.  He is a [place]       Current Outpatient Medications   Medication Sig Dispense Refill    albuterol (PROVENTIL HFA) 90 mcg/actuation inhaler Inhale 2 puffs into the lungs every 6 (six) hours as needed for Wheezing. Rescue 18 g 0    cephALEXin (KEFLEX) 500 MG capsule Take 1 capsule (500 mg total) by mouth 2 (two) times a day. for 5 days 10 capsule 0    cyclobenzaprine (FLEXERIL) 5 MG tablet Take by mouth.      HYDROcodone-acetaminophen (NORCO) 7.5-325 mg per tablet Take 1 tablet by mouth.      hydrOXYzine pamoate (VISTARIL) 25 MG Cap Take 25 mg by mouth 2 (two) times daily.      metroNIDAZOLE (FLAGYL) 500 MG tablet Take 1 tablet (500 mg total) by mouth every 12 (twelve) hours. 14 tablet 0    naproxen (NAPROSYN) 500 MG tablet Take 1 tablet (500 mg total) by mouth 2 (two) times daily. for 5 days 10 tablet 0    topiramate (TOPAMAX) 50 MG tablet Take 50 mg by mouth.       Current Facility-Administered Medications   Medication Dose Route Frequency Provider Last Rate Last Admin    etonogestreL subdermal device 68 mg  68 mg Implant  Aman Johnson MD   68 mg at 04/05/22 0945       Review of patient's allergies indicates:  No Known Allergies    Review of Systems  Review of Systems   Constitutional:  Negative for activity change, appetite change, chills, fatigue, fever and unexpected weight  change.   HENT:  Negative for mouth sores.    Respiratory:  Negative for cough, shortness of breath and wheezing.    Cardiovascular:  Negative for chest pain and palpitations.   Gastrointestinal:  Positive for abdominal pain. Negative for bloating, blood in stool, constipation, nausea and vomiting.   Endocrine: Negative for diabetes and hot flashes.   Genitourinary:  Positive for pelvic pain. Negative for dysmenorrhea, dyspareunia, dysuria, frequency, hematuria, menorrhagia, menstrual problem, urgency, vaginal bleeding, vaginal discharge, vaginal pain, urinary incontinence, postcoital bleeding and vaginal odor.   Musculoskeletal:  Negative for back pain and myalgias.   Integumentary:  Negative for rash, breast mass and nipple discharge.   Neurological:  Negative for seizures and headaches.   Psychiatric/Behavioral:  Negative for depression and sleep disturbance. The patient is not nervous/anxious.    Breast: Negative for mass, mastodynia and nipple discharge         Objective:     Physical Exam:   Constitutional: She appears well-developed and well-nourished. No distress.   BMI of 31.92    HENT:   Head: Normocephalic and atraumatic.    Eyes: EOM are normal.      Pulmonary/Chest: Effort normal. No respiratory distress.   Breasts: Non-tender, no engorgement, no masses, no retraction, no discharge. Negative for lymphadenopathy.         Abdominal: Soft. She exhibits no distension. There is no abdominal tenderness. There is no rebound and no guarding.   Suprapubic tenderness on left side     Genitourinary:    Vagina and uterus normal.   No  no vaginal discharge in the vagina.    Genitourinary Comments: Vulva without any obvious lesions.  Urethral meatus normal size and location without any lesion.  Urethra is non-tender without stricture or discharge.  Bladder is tender.  Vaginal vault with good support.  Minimal yellow discharge noted.  No obvious lesion.  Normal rugation.  Cervix is without any cervical motion  tenderness.  No obvious lesion.  Uterus is small, non-tender, normal contour.  Adnexa is without any masses or tenderness.  Perineum without obvious lesion.    Tenderness on left side of bladder and adnexa.  Right ovarian cyst palpable but not tender             Musculoskeletal: Normal range of motion.       Neurological: She is alert.    Skin: Skin is warm and dry.    Psychiatric: She has a normal mood and affect.      Urine dipstick with trace blood     Assessment:     1. Pelvic pain in female    2. Bacterial vaginosis    3. Interstitial cystitis             Plan:     I have discussed with the patient regarding her condition.  GC, CT, vag screen  Metronidazole for poss BV  Resume Elmiron for poss IC  Pelvic ultrasound    Back in about 4 weeks

## 2023-12-05 ENCOUNTER — PATIENT MESSAGE (OUTPATIENT)
Dept: OBSTETRICS AND GYNECOLOGY | Facility: CLINIC | Age: 33
End: 2023-12-05
Payer: MEDICAID

## 2023-12-05 ENCOUNTER — TELEPHONE (OUTPATIENT)
Dept: OBSTETRICS AND GYNECOLOGY | Facility: CLINIC | Age: 33
End: 2023-12-05
Payer: MEDICAID

## 2023-12-07 ENCOUNTER — HOSPITAL ENCOUNTER (OUTPATIENT)
Dept: RADIOLOGY | Facility: HOSPITAL | Age: 33
Discharge: HOME | End: 2023-12-07
Attending: OBSTETRICS & GYNECOLOGY
Payer: MEDICAID

## 2023-12-07 DIAGNOSIS — R10.2 PELVIC PAIN IN FEMALE: ICD-10-CM

## 2023-12-07 PROCEDURE — 76856 US EXAM PELVIC COMPLETE: CPT | Mod: 26,,, | Performed by: RADIOLOGY

## 2023-12-07 PROCEDURE — 76856 US PELVIS COMP WITH TRANSVAG NON-OB (XPD): ICD-10-PCS | Mod: 26,,, | Performed by: RADIOLOGY

## 2023-12-07 PROCEDURE — 76830 TRANSVAGINAL US NON-OB: CPT | Mod: 26,,, | Performed by: RADIOLOGY

## 2023-12-07 PROCEDURE — 76830 US PELVIS COMP WITH TRANSVAG NON-OB (XPD): ICD-10-PCS | Mod: 26,,, | Performed by: RADIOLOGY

## 2023-12-07 PROCEDURE — 76830 TRANSVAGINAL US NON-OB: CPT | Mod: TC

## 2024-04-16 ENCOUNTER — HOSPITAL ENCOUNTER (EMERGENCY)
Facility: HOSPITAL | Age: 34
Discharge: HOME | End: 2024-04-16
Attending: EMERGENCY MEDICINE

## 2024-04-16 VITALS
DIASTOLIC BLOOD PRESSURE: 68 MMHG | WEIGHT: 185 LBS | BODY MASS INDEX: 30.82 KG/M2 | HEIGHT: 65 IN | HEART RATE: 70 BPM | SYSTOLIC BLOOD PRESSURE: 120 MMHG | RESPIRATION RATE: 18 BRPM | TEMPERATURE: 99 F | OXYGEN SATURATION: 98 %

## 2024-04-16 DIAGNOSIS — J02.9 SORE THROAT: Primary | ICD-10-CM

## 2024-04-16 DIAGNOSIS — R05.9 COUGH, UNSPECIFIED TYPE: ICD-10-CM

## 2024-04-16 LAB
CTP QC/QA: YES
INFLUENZA A ANTIGEN, POC: NEGATIVE
INFLUENZA B ANTIGEN, POC: NEGATIVE
POC RAPID STREP A: NEGATIVE
SARS-COV-2 RDRP RESP QL NAA+PROBE: NEGATIVE

## 2024-04-16 PROCEDURE — 87804 INFLUENZA ASSAY W/OPTIC: CPT | Mod: ER

## 2024-04-16 PROCEDURE — 99284 EMERGENCY DEPT VISIT MOD MDM: CPT | Mod: ER

## 2024-04-16 PROCEDURE — 87880 STREP A ASSAY W/OPTIC: CPT | Mod: ER

## 2024-04-16 PROCEDURE — 87635 SARS-COV-2 COVID-19 AMP PRB: CPT | Mod: ER | Performed by: EMERGENCY MEDICINE

## 2024-04-16 RX ORDER — METHYLPREDNISOLONE 4 MG/1
TABLET ORAL
Qty: 1 EACH | Refills: 0 | Status: SHIPPED | OUTPATIENT
Start: 2024-04-16 | End: 2024-05-07

## 2024-04-16 RX ORDER — LEVOCETIRIZINE DIHYDROCHLORIDE 5 MG/1
5 TABLET, FILM COATED ORAL NIGHTLY
Qty: 14 TABLET | Refills: 0 | Status: SHIPPED | OUTPATIENT
Start: 2024-04-16 | End: 2024-04-30

## 2024-04-16 NOTE — ED PROVIDER NOTES
"Encounter Date: 4/16/2024    SCRIBE #1 NOTE: I, Heraclio Almaraz Do, am scribing for, and in the presence of,  Kathrine Kennedy MD. I have scribed the following portions of the note - Other sections scribed: HPI, ROS, PE, MDM.       History     Chief Complaint   Patient presents with    Sore Throat     SORE THROAT   URI  SX X 3 DAYS - AND   MOUTH  PAIN     Crystal Tsai is a 33 y.o. female, with no pertinent PMHx, who presents to the ED with acute sore throat described as "burning" that began 2 days ago. Patient reports associated tongue pain described as "burning", lip pain described as "burning." Her tongue felt swollen at onset then her throat began hurting. She reports associated fatigue, night sweats, and dry cough. Patient thought symptoms were from her dentures so attempted treatment by gargling salt water without relief. No other exacerbating or alleviating factors. Patient denies rhinorrhea, congestion, vomiting, diarrhea, or other associated symptoms.       The history is provided by the patient. No  was used.     Review of patient's allergies indicates:  No Known Allergies  Past Medical History:   Diagnosis Date    Interstitial cystitis     Kidney stone     Migraine     UTI (urinary tract infection)      Past Surgical History:   Procedure Laterality Date    ABDOMINAL SURGERY      CYSTOSCOPY W/ RETROGRADES Bilateral 1/4/2019    Procedure: CYSTOSCOPY, WITH RETROGRADE PYELOGRAM; hydrodistention;  Surgeon: Kirsty Acevedo MD;  Location: [facility] OR;  Service: Urology;  Laterality: Bilateral;  RN PREOP 12/31/2018    EXAMINATION UNDER ANESTHESIA N/A 12/17/2018    Procedure: Exam under anesthesia;  Surgeon: Edilson Santana MD;  Location: [facility] OR;  Service: General;  Laterality: N/A;    MD EXPLORATORY OF ABDOMEN      PROCTOSIGMOIDOSCOPY N/A 12/17/2018    Procedure: PROCTOSIGMOIDOSCOPY;  Surgeon: Edilson Santana MD;  Location: [facility] OR;  Service: General;  Laterality: N/A;     Family " History   Problem Relation Name Age of Onset    Cancer Mother      Diabetes Father       Social History     Tobacco Use    Smoking status: Former     Current packs/day: 1.00     Types: Cigarettes    Smokeless tobacco: Never   Substance Use Topics    Alcohol use: Not Currently     Comment: occassionally    Drug use: Yes     Types: Marijuana     Review of Systems   Constitutional:  Positive for fatigue. Negative for activity change, appetite change, chills and fever.   HENT:  Positive for sore throat. Negative for congestion, facial swelling, rhinorrhea, sneezing and trouble swallowing.         (+) Tongue pain   Respiratory:  Positive for cough (Dry). Negative for choking, shortness of breath and wheezing.    Cardiovascular:  Negative for chest pain and palpitations.   Gastrointestinal:  Negative for abdominal pain, diarrhea, nausea and vomiting.   Neurological:  Negative for dizziness, syncope, light-headedness and headaches.   All other systems reviewed and are negative.      Physical Exam     Initial Vitals [04/16/24 1536]   BP Pulse Resp Temp SpO2   (!) 126/90 84 16 98.2 °F (36.8 °C) 100 %      MAP       --         Physical Exam    Nursing note and vitals reviewed.  Constitutional: She appears well-developed and well-nourished. No distress.   HENT:   Head: Normocephalic and atraumatic.   Mouth/Throat: Uvula is midline, oropharynx is clear and moist and mucous membranes are normal. No oral lesions. No oropharyngeal exudate, posterior oropharyngeal edema or posterior oropharyngeal erythema.   No lip swelling, no tongue swelling, no oral lesions.    Eyes: Conjunctivae are normal.   Neck:   Normal range of motion.  Cardiovascular:  Normal rate, regular rhythm and normal heart sounds.           No murmur heard.  Pulmonary/Chest: Breath sounds normal. No respiratory distress.   Abdominal: Abdomen is soft. Bowel sounds are normal. She exhibits no distension. There is no abdominal tenderness.   Musculoskeletal:          General: No tenderness or edema. Normal range of motion.      Cervical back: Normal range of motion.     Neurological: She is alert and oriented to person, place, and time. GCS score is 15. GCS eye subscore is 4. GCS verbal subscore is 5. GCS motor subscore is 6.   Skin: Skin is warm and dry.   Psychiatric: She has a normal mood and affect. Her behavior is normal.         ED Course   Procedures  Labs Reviewed   SARS-COV-2 RDRP GENE    Narrative:     This test utilizes isothermal nucleic acid amplification technology to detect the SARS-CoV-2 RdRp nucleic acid segment. The analytical sensitivity (limit of detection) is 500 copies/swab.     A POSITIVE result is indicative of the presence of SARS-CoV-2 RNA; clinical correlation with patient history and other diagnostic information is necessary to determine patient infection status.    A NEGATIVE result means that SARS-CoV-2 nucleic acids are not present above the limit of detection. A NEGATIVE result should be treated as presumptive. It does not rule out the possibility of COVID-19 and should not be the sole basis for treatment decisions. If COVID-19 is strongly suspected based on clinical and exposure history, re-testing using an alternate molecular assay should be considered.     Commercial kits are provided by [employer].   _________________________________________________________________   The authorized Fact Sheet for Healthcare Providers and the authorized Fact Sheet for Patients of the ID NOW COVID-19 are available on the FDA website:    https://www.fda.gov/media/633813/download      https://www.fda.gov/media/556530/download      POCT URINE PREGNANCY   POCT INFLUENZA A/B MOLECULAR   POCT STREP A MOLECULAR   POCT STREP A, RAPID   POCT RAPID INFLUENZA A/B          Imaging Results    None          Medications - No data to display  Medical Decision Making    Crystal Tsai is a 33 y.o. female, with no pertinent PMHx, who presents to the ED with acute  "sore throat described as "burning" that began 2 days ago. Patient reports associated tongue pain described as "burning", lip pain described as "burning." Her tongue felt swollen at onset then her throat began hurting. She reports associated fatigue, night sweats, and dry cough. Patient thought symptoms were from her dentures so attempted treatment by gargling salt water without relief. No other exacerbating or alleviating factors. Patient denies rhinorrhea, congestion, vomiting, diarrhea, or other associated symptoms.     On exam, no tongue or lip swelling. No oral lesions. No tonsillar erythema, swelling or exudates. She was tested for covid, flu and strep - all are negative. Will treat with steroids and anti-histamine. Uncertain etiology.      Amount and/or Complexity of Data Reviewed  Labs: ordered.            Scribe Attestation:   Scribe #1: I performed the above scribed service and the documentation accurately describes the services I performed. I attest to the accuracy of the note.              I, Dr. Kathrine Kennedy, personally performed the services described in this documentation.   All medical record entries made by the scribe were at my direction and in my presence.   I have reviewed the chart and agree that the record is accurate and complete.   Kathrine Kennedy MD.  3:52 PM 04/16/2024                    Clinical Impression:  Final diagnoses:  [J02.9] Sore throat (Primary)  [R05.9] Cough, unspecified type          ED Disposition Condition    Discharge Stable          ED Prescriptions       Medication Sig Dispense Start Date End Date Auth. Provider    methylPREDNISolone (MEDROL DOSEPACK) 4 mg tablet Take as directed on package. 1 each 4/16/2024 5/7/2024 Kathrine Kennedy MD    levocetirizine (XYZAL) 5 MG tablet Take 1 tablet (5 mg total) by mouth every evening. for 14 days 14 tablet 4/16/2024 4/30/2024 Kathrine Kennedy MD          Follow-up Information    None          Kathrine Kennedy MD  04/16/24 0928    "

## 2024-04-16 NOTE — ED NOTES
Crystla Tsai, a 33 y.o. female presents to the ED w/ complaint of sore throat, hoariness, and swollen tongue x 3 days. Pt reports taking some antibiotic previously prescribed w/o relief. Denies SOB.      Chief Complaint   Patient presents with    Sore Throat     SORE THROAT   URI  SX X 3 DAYS - AND   MOUTH  PAIN     Review of patient's allergies indicates:  No Known Allergies  Past Medical History:   Diagnosis Date    Interstitial cystitis     Kidney stone     Migraine     UTI (urinary tract infection)

## 2024-04-16 NOTE — Clinical Note
"Crystal Amos" Eulalio was seen and treated in our emergency department on 4/16/2024.  She may return to work on 04/18/2024.       If you have any questions or concerns, please don't hesitate to call.      Kathrine Kennedy MD"

## 2024-04-16 NOTE — DISCHARGE INSTRUCTIONS
The cause of your symptoms is unclear. Take medications as directed. Consider being evaluated when swelling is present.

## 2024-07-17 NOTE — ASSESSMENT & PLAN NOTE
Patient did have urine cultures obtained two days ago which reveals pan-sensitive Escherichia coli.  She does not meet criteria for sepsis.  She has been started ceftriaxone.    
Patient was counseled on smoking cessation and she will be provided a nicotine transdermal patch applied while inpatient.  Will provide additional smoking cessation counseling prior to discharge.  
Patient's urinalysis is significant for a specific gravity of 1.010, positive for nitrites, 2+ blood.  Urine output has been fair.  Will obtain additional urine studies; provide aggressive IV fluid hydration; monitor the urine output; recheck the renal function in the morning; and avoid nephrotoxins.  
Will replete orally and recheck her potassium level in the morning.  
Epidural

## 2024-09-01 ENCOUNTER — HOSPITAL ENCOUNTER (EMERGENCY)
Facility: HOSPITAL | Age: 34
Discharge: LEFT BEFORE DISCHARGE | End: 2024-09-01
Attending: EMERGENCY MEDICINE

## 2024-09-01 VITALS
DIASTOLIC BLOOD PRESSURE: 79 MMHG | RESPIRATION RATE: 20 BRPM | HEART RATE: 78 BPM | WEIGHT: 199 LBS | TEMPERATURE: 98 F | BODY MASS INDEX: 33.15 KG/M2 | HEIGHT: 65 IN | OXYGEN SATURATION: 99 % | SYSTOLIC BLOOD PRESSURE: 120 MMHG

## 2024-09-01 DIAGNOSIS — R22.0 RIGHT FACIAL SWELLING: ICD-10-CM

## 2024-09-01 DIAGNOSIS — K04.7 DENTAL ABSCESS: Primary | ICD-10-CM

## 2024-09-01 LAB
B-HCG UR QL: NEGATIVE
CTP QC/QA: YES

## 2024-09-01 PROCEDURE — 81025 URINE PREGNANCY TEST: CPT | Mod: ER

## 2024-09-01 PROCEDURE — 99282 EMERGENCY DEPT VISIT SF MDM: CPT | Mod: 25,ER

## 2024-09-01 PROCEDURE — 81025 URINE PREGNANCY TEST: CPT | Mod: ER | Performed by: EMERGENCY MEDICINE

## 2024-09-02 NOTE — ED PROVIDER NOTES
"Encounter Date: 9/1/2024       History     Chief Complaint   Patient presents with    Facial Swelling     PT REPORTS RIGHT LOWER BROKEN TOOTH AND HAS HAD RIGHT JAW SWELLING AND PAIN FOR 3 WEEKS, PT REPORTS HAS TAKEN SEVERAL DIFFERENT ABX THAT SHE HAD LEFTOVER     33 y.o. female with tobacco abuse presents to ED c/o acute pain and swelling localized to the right lower face that began this morning. She reports cracking her tooth (#28) five months ago with intermittently recurrent pain since that time.   She reports fever at home today and mentions pain with swallowing. She also mentions vision in right eye is "hazy". She reports taking PCN (prescribed to her mother) over the last week for dental pain.  States she has not established care with a local dentist yet.      The history is provided by the patient.     Review of patient's allergies indicates:  No Known Allergies  Past Medical History:   Diagnosis Date    Interstitial cystitis     Kidney stone     Migraine     UTI (urinary tract infection)      Past Surgical History:   Procedure Laterality Date    ABDOMINAL SURGERY      CYSTOSCOPY W/ RETROGRADES Bilateral 1/4/2019    Procedure: CYSTOSCOPY, WITH RETROGRADE PYELOGRAM; hydrodistention;  Surgeon: Kirsty Acevedo MD;  Location: [facility] OR;  Service: Urology;  Laterality: Bilateral;  RN PREOP 12/31/2018    EXAMINATION UNDER ANESTHESIA N/A 12/17/2018    Procedure: Exam under anesthesia;  Surgeon: Edilson Santana MD;  Location: [facility] OR;  Service: General;  Laterality: N/A;    MD EXPLORATORY OF ABDOMEN      PROCTOSIGMOIDOSCOPY N/A 12/17/2018    Procedure: PROCTOSIGMOIDOSCOPY;  Surgeon: Edilson Santana MD;  Location: [facility] OR;  Service: General;  Laterality: N/A;     Family History   Problem Relation Name Age of Onset    Cancer Mother      Diabetes Father       Social History     Tobacco Use    Smoking status: Former     Current packs/day: 1.00     Types: Cigarettes    Smokeless tobacco: Never   Substance Use " Topics    Alcohol use: Not Currently     Comment: occassionally    Drug use: Yes     Types: Marijuana     Review of Systems   Constitutional:  Positive for fever.   HENT:  Positive for dental problem and facial swelling. Negative for drooling, trouble swallowing and voice change.        Physical Exam     Initial Vitals [09/01/24 2051]   BP Pulse Resp Temp SpO2   120/79 78 20 98.3 °F (36.8 °C) 99 %      MAP       --         Physical Exam    Nursing note and vitals reviewed.  Constitutional: She appears well-developed and well-nourished. She is not diaphoretic. No distress.   HENT:   Head: Normocephalic and atraumatic.   Mouth/Throat: Uvula is midline, oropharynx is clear and moist and mucous membranes are normal. Mucous membranes are not pale, not dry and not cyanotic. No trismus in the jaw. Abnormal dentition. Dental abscesses and dental caries present. No uvula swelling.       There is swelling of the right lower jaw without sublingual swelling, submandibular swelling, stridor, tongue swelling, throat swelling, muffled phonation or pooling of oral secretions.   Eyes: Conjunctivae and EOM are normal. Pupils are equal, round, and reactive to light.   Neck: Phonation normal. Neck supple. No stridor present.   Normal range of motion.  Cardiovascular:  Normal rate and intact distal pulses.           Pulmonary/Chest: No accessory muscle usage or stridor. No tachypnea. No respiratory distress.   Abdominal: She exhibits no distension. There is no abdominal tenderness.   Musculoskeletal:         General: No tenderness. Normal range of motion.      Cervical back: Normal range of motion and neck supple.     Neurological: She is alert and oriented to person, place, and time. She has normal strength. Gait normal. GCS eye subscore is 4. GCS verbal subscore is 5. GCS motor subscore is 6.   Skin: Skin is warm. Capillary refill takes less than 2 seconds.   Psychiatric: She has a normal mood and affect.         ED Course    Procedures  Labs Reviewed   POCT URINE PREGNANCY       Result Value    POC Preg Test, Ur Negative       Acceptable Yes            Imaging Results    None          Medications - No data to display  Medical Decision Making  Amount and/or Complexity of Data Reviewed  Labs: ordered.                          Medical Decision Making:   ED Management:  Patient eloped after my initial assessment.             Clinical Impression:  Final diagnoses:  [K04.7] Dental abscess (Primary)  [R22.0] Right facial swelling - right lower face          ED Disposition Condition    Eloped Stable                Mikel Hernandez MD  09/06/24 8914

## 2025-03-07 ENCOUNTER — HOSPITAL ENCOUNTER (EMERGENCY)
Facility: HOSPITAL | Age: 35
Discharge: HOME | End: 2025-03-07
Attending: EMERGENCY MEDICINE
Payer: MEDICAID

## 2025-03-07 VITALS
HEART RATE: 70 BPM | TEMPERATURE: 98 F | OXYGEN SATURATION: 100 % | SYSTOLIC BLOOD PRESSURE: 118 MMHG | BODY MASS INDEX: 33.33 KG/M2 | HEIGHT: 69 IN | RESPIRATION RATE: 18 BRPM | DIASTOLIC BLOOD PRESSURE: 66 MMHG | WEIGHT: 225 LBS

## 2025-03-07 DIAGNOSIS — R11.2 NAUSEA, VOMITING, AND DIARRHEA: Primary | ICD-10-CM

## 2025-03-07 DIAGNOSIS — R19.7 NAUSEA, VOMITING, AND DIARRHEA: Primary | ICD-10-CM

## 2025-03-07 LAB
ALBUMIN SERPL-MCNC: 3.6 G/DL (ref 3.3–5.5)
ALBUMIN SERPL-MCNC: 3.8 G/DL (ref 3.3–5.5)
ALP SERPL-CCNC: 56 U/L (ref 42–141)
ALP SERPL-CCNC: 69 U/L (ref 42–141)
B-HCG UR QL: NEGATIVE
BILIRUB SERPL-MCNC: 0.5 MG/DL (ref 0.2–1.6)
BILIRUB SERPL-MCNC: 0.5 MG/DL (ref 0.2–1.6)
BILIRUBIN, POC UA: NEGATIVE
BLOOD, POC UA: ABNORMAL
BUN SERPL-MCNC: 7 MG/DL (ref 7–22)
CALCIUM SERPL-MCNC: 9.6 MG/DL (ref 8–10.3)
CHLORIDE SERPL-SCNC: 111 MMOL/L (ref 98–108)
CLARITY, UA: CLEAR
COLOR, UA: YELLOW
CREAT SERPL-MCNC: 1 MG/DL (ref 0.6–1.2)
CTP QC/QA: YES
CTP QC/QA: YES
GLUCOSE SERPL-MCNC: 98 MG/DL (ref 73–118)
GLUCOSE, POC UA: NEGATIVE
HCT, POC: NORMAL
HGB, POC: NORMAL (ref 14–18)
INFLUENZA A ANTIGEN, POC: NEGATIVE
INFLUENZA B ANTIGEN, POC: NEGATIVE
KETONES, POC UA: NEGATIVE
LEUKOCYTE EST, POC UA: ABNORMAL
MCH, POC: NORMAL
MCHC, POC: NORMAL
MCV, POC: NORMAL
MPV, POC: NORMAL
NITRITE, POC UA: NEGATIVE
PH UR STRIP: 6 [PH] (ref 5–8)
POC ALT (SGPT): 34 U/L (ref 10–47)
POC ALT (SGPT): 36 U/L (ref 10–47)
POC AMYLASE: 56 U/L (ref 14–97)
POC AST (SGOT): 27 U/L (ref 11–38)
POC AST (SGOT): 27 U/L (ref 11–38)
POC GGT: 33 U/L (ref 5–65)
POC PLATELET COUNT: NORMAL
POC TCO2: 26 MMOL/L (ref 18–33)
POTASSIUM BLD-SCNC: 4.6 MMOL/L (ref 3.6–5.1)
PROTEIN, POC UA: NEGATIVE
PROTEIN, POC: 7.2 G/DL (ref 6.4–8.1)
PROTEIN, POC: 7.4 G/DL (ref 6.4–8.1)
RBC, POC: NORMAL
RDW, POC: NORMAL
SARS-COV-2 RDRP RESP QL NAA+PROBE: NEGATIVE
SODIUM BLD-SCNC: 140 MMOL/L (ref 128–145)
SPECIFIC GRAVITY, POC UA: 1.02 (ref 1–1.03)
UROBILINOGEN, POC UA: 0.2 E.U./DL
WBC, POC: NORMAL

## 2025-03-07 PROCEDURE — 85025 COMPLETE CBC W/AUTO DIFF WBC: CPT | Mod: ER

## 2025-03-07 PROCEDURE — 87635 SARS-COV-2 COVID-19 AMP PRB: CPT | Mod: ER | Performed by: EMERGENCY MEDICINE

## 2025-03-07 PROCEDURE — 87804 INFLUENZA ASSAY W/OPTIC: CPT | Mod: 59,ER

## 2025-03-07 PROCEDURE — 96375 TX/PRO/DX INJ NEW DRUG ADDON: CPT | Mod: ER

## 2025-03-07 PROCEDURE — 96361 HYDRATE IV INFUSION ADD-ON: CPT | Mod: ER

## 2025-03-07 PROCEDURE — 81025 URINE PREGNANCY TEST: CPT | Mod: ER

## 2025-03-07 PROCEDURE — 99284 EMERGENCY DEPT VISIT MOD MDM: CPT | Mod: 25,ER

## 2025-03-07 PROCEDURE — 87086 URINE CULTURE/COLONY COUNT: CPT | Performed by: NURSE PRACTITIONER

## 2025-03-07 PROCEDURE — 96374 THER/PROPH/DIAG INJ IV PUSH: CPT | Mod: ER

## 2025-03-07 PROCEDURE — 25000003 PHARM REV CODE 250: Mod: ER | Performed by: NURSE PRACTITIONER

## 2025-03-07 PROCEDURE — 63600175 PHARM REV CODE 636 W HCPCS: Mod: JZ,TB,ER | Performed by: NURSE PRACTITIONER

## 2025-03-07 PROCEDURE — 81025 URINE PREGNANCY TEST: CPT | Mod: ER | Performed by: EMERGENCY MEDICINE

## 2025-03-07 PROCEDURE — 80053 COMPREHEN METABOLIC PANEL: CPT | Mod: ER

## 2025-03-07 PROCEDURE — 82150 ASSAY OF AMYLASE: CPT | Mod: ER

## 2025-03-07 PROCEDURE — 82040 ASSAY OF SERUM ALBUMIN: CPT | Mod: 59,ER

## 2025-03-07 RX ORDER — ONDANSETRON 4 MG/1
4 TABLET, ORALLY DISINTEGRATING ORAL EVERY 6 HOURS PRN
Qty: 10 TABLET | Refills: 0 | Status: SHIPPED | OUTPATIENT
Start: 2025-03-07

## 2025-03-07 RX ORDER — DICYCLOMINE HYDROCHLORIDE 20 MG/1
20 TABLET ORAL 2 TIMES DAILY PRN
Qty: 20 TABLET | Refills: 0 | Status: SHIPPED | OUTPATIENT
Start: 2025-03-07 | End: 2025-04-06

## 2025-03-07 RX ORDER — ONDANSETRON HYDROCHLORIDE 2 MG/ML
4 INJECTION, SOLUTION INTRAVENOUS
Status: COMPLETED | OUTPATIENT
Start: 2025-03-07 | End: 2025-03-07

## 2025-03-07 RX ORDER — FAMOTIDINE 20 MG/1
20 TABLET, FILM COATED ORAL 2 TIMES DAILY PRN
Qty: 20 TABLET | Refills: 0 | Status: SHIPPED | OUTPATIENT
Start: 2025-03-07 | End: 2026-03-07

## 2025-03-07 RX ORDER — KETOROLAC TROMETHAMINE 30 MG/ML
15 INJECTION, SOLUTION INTRAMUSCULAR; INTRAVENOUS
Status: COMPLETED | OUTPATIENT
Start: 2025-03-07 | End: 2025-03-07

## 2025-03-07 RX ORDER — NITROFURANTOIN 25; 75 MG/1; MG/1
100 CAPSULE ORAL 2 TIMES DAILY
Qty: 10 CAPSULE | Refills: 0 | Status: SHIPPED | OUTPATIENT
Start: 2025-03-07 | End: 2025-03-12

## 2025-03-07 RX ADMIN — ONDANSETRON 4 MG: 2 INJECTION INTRAMUSCULAR; INTRAVENOUS at 01:03

## 2025-03-07 RX ADMIN — KETOROLAC TROMETHAMINE 15 MG: 30 INJECTION, SOLUTION INTRAMUSCULAR; INTRAVENOUS at 01:03

## 2025-03-07 RX ADMIN — SODIUM CHLORIDE 1000 ML: 9 INJECTION, SOLUTION INTRAVENOUS at 01:03

## 2025-03-07 NOTE — ED TRIAGE NOTES
Fever and runny nose yesterday. Tested negative for flu. Pt woke up this morning with nvd and abd discomfort. Pt denies fever today

## 2025-03-07 NOTE — DISCHARGE INSTRUCTIONS
Thank you for coming to our Emergency Department today. It is important to remember that some problems or medical conditions are difficult to diagnose and may not be found during your Emergency Department visit.     Be sure to follow up with your primary care doctor and review all labs/imaging/tests that were performed during your ER visit with them. Some labs/tests may be outside of the normal range and require non-emergent follow-up and further investigation to help diagnose/exclude/prevent complications or other potentially serious medical conditions that were not addressed during your ER visit.    If you do not have a primary care doctor, you may contact the one listed on your discharge paperwork or you may also call the [facility] Appointment Desk at [phone] to schedule an appointment and establish care with one. It is important to your health that you have a primary care doctor.    Please take all medications as directed. All medications may potentially have side-effects and it is impossible to predict which medications may give you side-effects or what side-effects (if any) they will give you.. If you feel that you are having a negative effect or side-effect of any medication you should immediately stop taking them and seek medical attention. If you feel that you are having a life-threatening reaction call 911.    Return to the ER with any questions/concerns, new/concerning symptoms, worsening or failure to improve.     Do not drive, swim, climb to height, take a bath, operate heavy machinery, drink alcohol or take potentially sedating medications, sign any legal documents or make any important decisions for 24 hours if you have received any pain medications, sedatives or mood altering drugs during your ER visit or within 24 hours of taking them if they have been prescribed to you.     You can find additional resources for Dentists, hearing aids, durable medical equipment, low cost pharmacies and  other resources at [email or web address].org    BELOW THIS LINE ONLY APPLIES IF YOU HAVE A COVID TEST PENDING OR IF YOU HAVE BEEN DIAGNOSED WITH COVID:  Please access [facility] to review the results of your test. Until the results of your COVID test return, you should isolate yourself so as not to potentially spread illness to others.   If your COVID test returns positive, you should isolate yourself so as not to spread illness to others. After five full days, if you are feeling better and you have not had fever for 24 hours, you can return to your typical daily activities, but you must wear a mask around others for an additional 5 days.   If your COVID test returns negative and you are either unvaccinated or more than six months out from your two-dose vaccine and are not yet boosted, you should still quarantine for 5 full days followed by strict mask use for an additional 5 full days.   If your COVID test returns negative and you have received your 2-dose initial vaccine as well as a booster, you should continue strict mask use for 10 full days after the exposure.  For all those exposed, best practice includes a test at day 5 after the exposure. This can be a home test or a test through one of the many testing centers throughout our community.   Masking is always advised to limit the spread of COVID. Cdc.gov is an excellent site to obtain the latest up to date recommendations regarding COVID and COVID testing.     CDC Testing and Quarantine Guidelines for patients with exposure to a known-positive COVID-19 person:  A close exposure is defined as anyone who has had an exposure (masked or unmasked) to a known COVID -19 positive person within 6 feet of someone for a cumulative total of 15 minutes or more over a 24-hour period.   Vaccinated and/or if you recently had a positive covid test within 90 days do NOT need to quarantine after contact with someone who had COVID-19 unless you develop symptoms.   Fully vaccinated  people who have not had a positive test within 90 days, should get tested 3-5 days after their exposure, even if they don't have symptoms and wear a mask indoors in public for 14 days following exposure or until their test result is negative.      Unvaccinated and/or NOT had a positive test within 90 days and meet close exposure  You are required by CDC guidelines to quarantine for at least 5 days from time of exposure followed by 5 days of strict masking. It is recommended, but not required to test after 5 days, unless you develop symptoms, in which case you should test at that time.  If you get tested after 5 days and your test is positive, your 5 day period of isolation starts the day of the positive test.    If your exposure does not meet the above definition, you can return to your normal daily activities to include social distancing, wearing a mask and frequent handwashing.      Here is a link to guidance from the CDC:  https://www.cdc.gov/media/releases/2021/s1227-isolation-quarantine-guidance.html      [place] Dept Of Health Testing Sites:  https://ldh.la.gov/page/3934      [facility] website with testing locations and guidance:  [email or web address]

## 2025-03-07 NOTE — ED PROVIDER NOTES
Encounter Date: 3/7/2025       History     Chief Complaint   Patient presents with    Vomiting     Vomiting, nausea, fever.      Nausea     CC:  Nausea, vomiting, diarrhea    HPI:  This is a 34-year-old female with interstitial cystitis presenting to the ED for evaluation of nausea, vomiting, diarrhea.  Symptoms began this morning.  Patient reports having fever (102), body aches, and chills since Wednesday night.  Fever resolved yesterday.  She went her PCP yesterday and had negative COVID and flu tests.  She has been taking ibuprofen and Tylenol for her symptoms.  No known sick contacts.    The history is provided by the patient. No  was used.     Review of patient's allergies indicates:  No Known Allergies  Past Medical History:   Diagnosis Date    Interstitial cystitis     Kidney stone     Migraine     UTI (urinary tract infection)      Past Surgical History:   Procedure Laterality Date    ABDOMINAL SURGERY      CYSTOSCOPY W/ RETROGRADES Bilateral 1/4/2019    Procedure: CYSTOSCOPY, WITH RETROGRADE PYELOGRAM; hydrodistention;  Surgeon: Kirsty Acevedo MD;  Location: [facility] OR;  Service: Urology;  Laterality: Bilateral;  RN PREOP 12/31/2018    EXAMINATION UNDER ANESTHESIA N/A 12/17/2018    Procedure: Exam under anesthesia;  Surgeon: Edilson Santana MD;  Location: [facility] OR;  Service: General;  Laterality: N/A;    RI EXPLORATORY OF ABDOMEN      PROCTOSIGMOIDOSCOPY N/A 12/17/2018    Procedure: PROCTOSIGMOIDOSCOPY;  Surgeon: Edilson Santana MD;  Location: [facility] OR;  Service: General;  Laterality: N/A;     Family History   Problem Relation Name Age of Onset    Cancer Mother      Diabetes Father       Social History[1]  Review of Systems   Constitutional:  Positive for chills, fatigue and fever. Negative for appetite change.   HENT:  Negative for congestion, rhinorrhea and sore throat.    Respiratory:  Negative for cough and shortness of breath.    Cardiovascular:  Negative for chest pain.    Gastrointestinal:  Positive for diarrhea, nausea and vomiting.   Genitourinary:  Negative for dysuria.   Musculoskeletal:  Negative for back pain.   Skin:  Negative for rash.   Neurological:  Negative for weakness.   Hematological:  Does not bruise/bleed easily.       Physical Exam     Initial Vitals [03/07/25 1221]   BP Pulse Resp Temp SpO2   128/68 84 18 98.2 °F (36.8 °C) 99 %      MAP       --         Physical Exam    Constitutional: She appears well-developed and well-nourished. She is not diaphoretic. No distress.   HENT:   Head: Normocephalic and atraumatic.   Neck:   Normal range of motion.  Cardiovascular:  Normal rate, regular rhythm, normal heart sounds and intact distal pulses.           Pulmonary/Chest: Breath sounds normal. No respiratory distress.   Abdominal: Abdomen is soft. Bowel sounds are normal. There is no abdominal tenderness.   Musculoskeletal:         General: Normal range of motion.      Cervical back: Normal range of motion.     Neurological: She is alert and oriented to person, place, and time.   Skin: Skin is warm and dry.   Psychiatric: She has a normal mood and affect. Her behavior is normal.         ED Course   Procedures  Labs Reviewed   POCT URINALYSIS W/O SCOPE - Abnormal       Result Value    Glucose, UA Negative      Bilirubin, UA Negative      Ketones, UA Negative      Spec Grav UA 1.020      Blood, UA Trace-intact (*)     PH, UA 6.0      Protein, UA Negative      Urobilinogen, UA 0.2      Nitrite, UA Negative      Leukocytes, UA Trace (*)     Color, UA POC Yellow      Clarity, UA, POC Clear     POCT CMP - Abnormal    Albumin, POC 3.6      Alkaline Phosphatase, POC 56      ALT (SGPT), POC 36      AST (SGOT), POC 27      POC BUN 7      Calcium, POC 9.6      POC Chloride 111 (*)     POC Creatinine 1.0      POC Glucose 98      POC Potassium 4.6      POC Sodium 140      Bilirubin, POC 0.5      POC TCO2 26      Protein, POC 7.2     CULTURE, URINE   POCT URINE PREGNANCY    POC Preg  Test, Ur Negative       Acceptable Yes     SARS-COV-2 RDRP GENE    POC Rapid COVID Negative       Acceptable Yes     POCT CBC    Hematocrit        Hemoglobin        RBC        WBC        MCV        MCH, POC        MCHC        RDW-CV        Platelet Count, POC        MPV       POCT INFLUENZA A/B MOLECULAR   POCT URINALYSIS W/O SCOPE   POCT RAPID INFLUENZA A/B    Influenza B Ag negative      Inflenza A Ag negative     POCT CMP   POCT LIVER PANEL   POCT LIVER PANEL    Albumin, POC 3.8      Alkaline Phosphatase, POC 69      ALT (SGPT), POC 34      Amylase, POC 56      AST (SGOT), POC 27      POC GGT 33      Bilirubin, POC 0.5      Protein, POC 7.4            Imaging Results    None          Medications   sodium chloride 0.9% bolus 1,000 mL 1,000 mL (0 mLs Intravenous Stopped 3/7/25 1517)   ondansetron injection 4 mg (4 mg Intravenous Given 3/7/25 1358)   ketorolac injection 15 mg (15 mg Intravenous Given 3/7/25 1358)     Medical Decision Making  34-year-old female presenting to ED for evaluation of nausea, vomiting, diarrhea.  Differentials include viral illness, diverticulitis, cholecystitis, appendicitis, UTI, bowel obstruction, others.  On exam, patient is pleasant well-appearing.  Vital signs are stable and reassuring.  COVID and flu negative.  UPT negative.  No leukocytosis on CBC concerning for systemic infection.  No significant electrolyte abnormality or evidence of organ dysfunction on labs.  Patient felt improved after fluids and medications.  Tolerating oral intake.  Trace leukocytes.  Urine culture pending.  Patient wants to receive antibiotics for possible UTI pending culture.  Will discharge home.  Follow up with PCP.    Amount and/or Complexity of Data Reviewed  Labs: ordered. Decision-making details documented in ED Course.    Risk  Prescription drug management.               ED Course as of 03/07/25 1633   Fri Mar 07, 2025   1327 Leukocytes, UA(!): Trace [MM]      ED Course  User Index  [MM] Lauren Valencia NP                           Clinical Impression:  Final diagnoses:  [R11.2, R19.7] Nausea, vomiting, and diarrhea (Primary)          ED Disposition Condition    Discharge Stable          ED Prescriptions       Medication Sig Dispense Start Date End Date Auth. Provider    ondansetron (ZOFRAN-ODT) 4 MG TbDL Take 1 tablet (4 mg total) by mouth every 6 (six) hours as needed (nausea). 10 tablet 3/7/2025 -- Lauren Valencia NP    famotidine (PEPCID) 20 MG tablet Take 1 tablet (20 mg total) by mouth 2 (two) times daily as needed for Heartburn. 20 tablet 3/7/2025 3/7/2026 Lauren Valencia NP    dicyclomine (BENTYL) 20 mg tablet Take 1 tablet (20 mg total) by mouth 2 (two) times daily as needed (Stomach cramping). 20 tablet 3/7/2025 4/6/2025 Lauren Valencia NP    nitrofurantoin, macrocrystal-monohydrate, (MACROBID) 100 MG capsule Take 1 capsule (100 mg total) by mouth 2 (two) times daily. for 5 days 10 capsule 3/7/2025 3/12/2025 Lauren Valencia NP          Follow-up Information       Follow up With Specialties Details Why Contact Info    Savana Rashid MD Family Medicine Schedule an appointment as soon as possible for a visit in 2 days For follow-up [address] [facility] [id number]  [phone]      [facility] ED Emergency Medicine Go to  If symptoms worsen [address] [facility] [address] [phone]                 [1]   Social History  Tobacco Use    Smoking status: Former     Current packs/day: 1.00     Types: Cigarettes    Smokeless tobacco: Never   Substance Use Topics    Alcohol use: Not Currently     Comment: occassionally    Drug use: Yes     Types: Marijuana        Lauren Valencia NP  03/07/25 6312

## 2025-03-07 NOTE — Clinical Note
"Crystal Montgomery (Hope)gado was seen and treated in our emergency department on 3/7/2025.  She may return to work on 03/09/2025.       If you have any questions or concerns, please don't hesitate to call.      Juan Pablo JALLOH RN    "

## 2025-03-09 LAB — BACTERIA UR CULT: NORMAL

## (undated) DEVICE — CANISTER SUCTION 2 LTR

## (undated) DEVICE — BLANKET UPPER BODY 78.7X29.9IN

## (undated) DEVICE — SYR ONLY LUER LOCK 20CC

## (undated) DEVICE — Device

## (undated) DEVICE — SUPPORT ULNA NERVE PROTECTOR

## (undated) DEVICE — SOL IRR NACL .9% 3000ML

## (undated) DEVICE — MAT QUICK 40X30 FLOOR FLUID LF

## (undated) DEVICE — CATH 5FR OPEN END URETERAL

## (undated) DEVICE — COVER SNAP KAP 26IN